# Patient Record
Sex: FEMALE | Race: WHITE | Employment: OTHER | URBAN - METROPOLITAN AREA
[De-identification: names, ages, dates, MRNs, and addresses within clinical notes are randomized per-mention and may not be internally consistent; named-entity substitution may affect disease eponyms.]

---

## 2017-02-22 ENCOUNTER — HOSPITAL ENCOUNTER (EMERGENCY)
Age: 52
Discharge: HOME | End: 2017-02-22
Attending: FAMILY MEDICINE

## 2017-02-22 VITALS
BODY MASS INDEX: 25.39 KG/M2 | OXYGEN SATURATION: 97 % | HEART RATE: 93 BPM | TEMPERATURE: 99.5 F | DIASTOLIC BLOOD PRESSURE: 85 MMHG | SYSTOLIC BLOOD PRESSURE: 137 MMHG | RESPIRATION RATE: 18 BRPM | WEIGHT: 158 LBS | HEIGHT: 66 IN

## 2017-02-22 DIAGNOSIS — R68.89 FLU-LIKE SYMPTOMS: Primary | ICD-10-CM

## 2017-02-22 LAB
INFLUENZA A AG (POC): NORMAL
INFLUENZA AG (POC) NEGATIVE CTRL.: NORMAL
INFLUENZA AG (POC) POSITIVE CTRL.: NORMAL
INFLUENZA B AG (POC): NORMAL
LOT NUMBER POC: NORMAL

## 2017-02-22 RX ORDER — PROMETHAZINE HYDROCHLORIDE AND DEXTROMETHORPHAN HYDROBROMIDE 6.25; 15 MG/5ML; MG/5ML
5 SYRUP ORAL
Qty: 120 ML | Refills: 0 | Status: SHIPPED | OUTPATIENT
Start: 2017-02-22 | End: 2017-10-10

## 2017-02-22 RX ORDER — ONDANSETRON 4 MG/1
4 TABLET, ORALLY DISINTEGRATING ORAL
Qty: 8 TAB | Refills: 0 | Status: SHIPPED | OUTPATIENT
Start: 2017-02-22 | End: 2017-10-10

## 2017-02-22 RX ORDER — OSELTAMIVIR PHOSPHATE 75 MG/1
75 CAPSULE ORAL 2 TIMES DAILY
Qty: 10 CAP | Refills: 0 | Status: SHIPPED | OUTPATIENT
Start: 2017-02-22 | End: 2017-02-27

## 2017-02-22 RX ORDER — BENZONATATE 200 MG/1
200 CAPSULE ORAL
Qty: 21 CAP | Refills: 0 | Status: SHIPPED | OUTPATIENT
Start: 2017-02-22 | End: 2017-03-01

## 2017-02-22 NOTE — UC PROVIDER NOTE
Patient is a 46 y.o. female presenting with flu symptoms. The history is provided by the patient. Flu   This is a new problem. The current episode started 6 to 12 hours ago. The problem occurs constantly. The problem has been rapidly worsening. The cough is non-productive. There has been a fever of 102 - 102.9 F. The fever has been present for less than 1 day. Associated symptoms include chills, headaches and myalgias. Pertinent negatives include no wheezing and no nausea. Her past medical history does not include pneumonia or asthma. History reviewed. No pertinent past medical history. Past Surgical History:   Procedure Laterality Date    EXTRACTION ERUPTED TOOTH/EXR Bilateral 3/30/2016    Patient Has Implants    HX TONSILLECTOMY           Family History   Problem Relation Age of Onset    Hypertension Mother     Heart Disease Father 62     MI    Lung Disease Father      smoker, emphysema    No Known Problems Sister     Hypertension Brother     Diabetes Paternal Grandmother         Social History     Social History    Marital status:      Spouse name: N/A    Number of children: N/A    Years of education: N/A     Occupational History    Not on file. Social History Main Topics    Smoking status: Former Smoker     Quit date: 11/28/2014    Smokeless tobacco: Never Used    Alcohol use Yes      Comment: few drinks per month    Drug use: No    Sexual activity: Yes     Partners: Male     Birth control/ protection: None     Other Topics Concern    Not on file     Social History Narrative                ALLERGIES: Review of patient's allergies indicates no known allergies. Review of Systems   Constitutional: Positive for chills. Respiratory: Negative for wheezing. Gastrointestinal: Negative for nausea. Musculoskeletal: Positive for myalgias. Neurological: Positive for headaches.        Vitals:    02/22/17 1622   BP: 137/85   Pulse: 93   Resp: 18   Temp: 99.5 °F (37.5 °C) SpO2: 97%   Weight: 71.7 kg (158 lb)   Height: 5' 6\" (1.676 m)       Physical Exam   Constitutional: She has a sickly appearance. No distress. HENT:   Right Ear: Tympanic membrane and ear canal normal.   Left Ear: Tympanic membrane and ear canal normal.   Nose: Nose normal.   Mouth/Throat: No oropharyngeal exudate, posterior oropharyngeal edema or posterior oropharyngeal erythema. Eyes: Conjunctivae are normal. Right eye exhibits no discharge. Left eye exhibits no discharge. Neck: Neck supple. Pulmonary/Chest: Effort normal and breath sounds normal. No respiratory distress. She has no wheezes. She has no rales. Lymphadenopathy:     She has no cervical adenopathy. Skin: No rash noted. Nursing note and vitals reviewed. MDM     Differential Diagnosis; Clinical Impression; Plan:     CLINICAL IMPRESSION:  Flu-like symptoms  (primary encounter diagnosis)      DDX    Plan:    Rapid flu- negative  Start tamiflu and cough rxc  Rest/ fluids  advil  Amount and/or Complexity of Data Reviewed:   Clinical lab tests:  Ordered and reviewed   Review and summarize past medical records:  Yes  Risk of Significant Complications, Morbidity, and/or Mortality:   Presenting problems: Moderate  Diagnostic procedures: Moderate  Management options:   Moderate  Progress:   Patient progress:  Stable      Procedures

## 2017-02-22 NOTE — DISCHARGE INSTRUCTIONS
Influenza (Flu): Care Instructions  Your Care Instructions  Influenza (flu) is an infection in the lungs and breathing passages. It is caused by the influenza virus. There are different strains, or types, of the flu virus from year to year. Unlike the common cold, the flu comes on suddenly and the symptoms, such as a cough, congestion, fever, chills, fatigue, aches, and pains, are more severe. These symptoms may last up to 10 days. Although the flu can make you feel very sick, it usually doesn't cause serious health problems. Home treatment is usually all you need for flu symptoms. But your doctor may prescribe antiviral medicine to prevent other health problems, such as pneumonia, from developing. Older people and those who have a long-term health condition, such as lung disease, are most at risk for having pneumonia or other health problems. Follow-up care is a key part of your treatment and safety. Be sure to make and go to all appointments, and call your doctor if you are having problems. Its also a good idea to know your test results and keep a list of the medicines you take. How can you care for yourself at home? · Get plenty of rest.  · Drink plenty of fluids, enough so that your urine is light yellow or clear like water. If you have kidney, heart, or liver disease and have to limit fluids, talk with your doctor before you increase the amount of fluids you drink. · Take an over-the-counter pain medicine if needed, such as acetaminophen (Tylenol), ibuprofen (Advil, Motrin), or naproxen (Aleve), to relieve fever, headache, and muscle aches. Read and follow all instructions on the label. No one younger than 20 should take aspirin. It has been linked to Reye syndrome, a serious illness. · Do not smoke. Smoking can make the flu worse. If you need help quitting, talk to your doctor about stop-smoking programs and medicines. These can increase your chances of quitting for good.   · Breathe moist air from a hot shower or from a sink filled with hot water to help clear a stuffy nose. · Before you use cough and cold medicines, check the label. These medicines may not be safe for young children or for people with certain health problems. · If the skin around your nose and lips becomes sore, put some petroleum jelly on the area. · To ease coughing:  ¨ Drink fluids to soothe a scratchy throat. ¨ Suck on cough drops or plain hard candy. ¨ Take an over-the-counter cough medicine that contains dextromethorphan to help you get some sleep. Read and follow all instructions on the label. ¨ Raise your head at night with an extra pillow. This may help you rest if coughing keeps you awake. · Take any prescribed medicine exactly as directed. Call your doctor if you think you are having a problem with your medicine. To avoid spreading the flu  · Wash your hands regularly, and keep your hands away from your face. · Stay home from school, work, and other public places until you are feeling better and your fever has been gone for at least 24 hours. The fever needs to have gone away on its own without the help of medicine. · Ask people living with you to talk to their doctors about preventing the flu. They may get antiviral medicine to keep from getting the flu from you. · To prevent the flu in the future, get a flu vaccine every fall. Encourage people living with you to get the vaccine. · Cover your mouth when you cough or sneeze. When should you call for help? Call 911 anytime you think you may need emergency care. For example, call if:  · You have severe trouble breathing. Call your doctor now or seek immediate medical care if:  · You have new or worse trouble breathing. · You seem to be getting much sicker. · You feel very sleepy or confused. · You have a new or higher fever. · You get a new rash.   Watch closely for changes in your health, and be sure to contact your doctor if:  · You begin to get better and then get worse. · You are not getting better after 1 week. Where can you learn more? Go to http://gallito-harleen.info/. Enter D057 in the search box to learn more about \"Influenza (Flu): Care Instructions. \"  Current as of: May 23, 2016  Content Version: 11.1  © 1835-1764 [email or web address]. Care instructions adapted under license by [employer] (which disclaims liability or warranty for this information). If you have questions about a medical condition or this instruction, always ask your healthcare professional. [address] any warranty or liability for your use of this information.

## 2017-10-10 ENCOUNTER — HOSPITAL ENCOUNTER (EMERGENCY)
Age: 52
Discharge: HOME | End: 2017-10-10
Attending: FAMILY MEDICINE

## 2017-10-10 VITALS
TEMPERATURE: 98 F | SYSTOLIC BLOOD PRESSURE: 147 MMHG | WEIGHT: 160 LBS | HEART RATE: 81 BPM | HEIGHT: 66 IN | RESPIRATION RATE: 16 BRPM | BODY MASS INDEX: 25.71 KG/M2 | OXYGEN SATURATION: 99 % | DIASTOLIC BLOOD PRESSURE: 82 MMHG

## 2017-10-10 DIAGNOSIS — L30.9 DERMATITIS: Primary | ICD-10-CM

## 2017-10-10 RX ORDER — PREDNISONE 10 MG/1
TABLET ORAL
Qty: 21 TAB | Refills: 0 | Status: SHIPPED | OUTPATIENT
Start: 2017-10-10 | End: 2018-01-24

## 2017-10-10 NOTE — UC PROVIDER NOTE
Patient is a 46 y.o. female presenting with rash. The history is provided by the patient. Rash    This is a new problem. Episode onset: 4-5 days ago. The problem has been gradually worsening. The problem is associated with an unknown factor. There has been no fever. The rash is present on the trunk, abdomen and left arm. The patient is experiencing no pain. Associated symptoms include itching. She has tried anti-itch cream for the symptoms. The treatment provided no relief. No past medical history on file. Past Surgical History:   Procedure Laterality Date    EXTRACTION ERUPTED TOOTH/EXR Bilateral 3/30/2016    Patient Has Implants    HX TONSILLECTOMY           Family History   Problem Relation Age of Onset    Hypertension Mother     Heart Disease Father 62     MI    Lung Disease Father      smoker, emphysema    No Known Problems Sister     Hypertension Brother     Diabetes Paternal Grandmother         Social History     Social History    Marital status:      Spouse name: N/A    Number of children: N/A    Years of education: N/A     Occupational History    Not on file. Social History Main Topics    Smoking status: Former Smoker     Quit date: 11/28/2014    Smokeless tobacco: Never Used    Alcohol use Yes      Comment: few drinks per month    Drug use: No    Sexual activity: Yes     Partners: Male     Birth control/ protection: None     Other Topics Concern    Not on file     Social History Narrative                ALLERGIES: Review of patient's allergies indicates no known allergies. Review of Systems   Constitutional: Negative for chills and fever. Respiratory: Negative for shortness of breath and wheezing. Cardiovascular: Negative for chest pain and palpitations. Gastrointestinal: Negative for nausea and vomiting. Musculoskeletal: Negative for myalgias. Skin: Positive for itching and rash.        Vitals:    10/10/17 1923   BP: 147/82   Pulse: 81   Resp: 16 Temp: 98 °F (36.7 °C)   SpO2: 99%   Weight: 72.6 kg (160 lb)   Height: 5' 6\" (1.676 m)       Physical Exam   Constitutional: She appears well-developed and well-nourished. No distress. Neurological: She is alert. Skin: She is not diaphoretic. Scattered Erythematous maculopapular lesions on left arm bilateral lower abdomen   Psychiatric: She has a normal mood and affect. Her behavior is normal. Judgment and thought content normal.   Nursing note and vitals reviewed. MDM     Differential Diagnosis; Clinical Impression; Plan:     CLINICAL IMPRESSION:  Dermatitis  (primary encounter diagnosis)    Plan:  1. pred kinjal  2. Pcp if no improvement  Risk of Significant Complications, Morbidity, and/or Mortality:   Presenting problems: Moderate  Management options:   Moderate  Progress:   Patient progress:  Stable      Procedures

## 2017-10-10 NOTE — DISCHARGE INSTRUCTIONS
Dermatitis: Care Instructions  Your Care Instructions  Dermatitis is the general name used for any rash or inflammation of the skin. Different kinds of dermatitis cause different kinds of rashes. Common causes of a rash include new medicines, plants (such as poison oak or poison ivy), heat, and stress. Certain illnesses can also cause a rash. An allergic reaction to something that touches your skin, such as latex, nickel, or poison ivy, is called contact dermatitis. Contact dermatitis may also be caused by something that irritates the skin, such as bleach, a chemical, or soap. These types of rashes cannot be spread from person to person. How long your rash will last depends on what caused it. Rashes may last a few days or months. Follow-up care is a key part of your treatment and safety. Be sure to make and go to all appointments, and call your doctor if you are having problems. It's also a good idea to know your test results and keep a list of the medicines you take. How can you care for yourself at home? · Do not scratch the rash. Cut your nails short, and file them smooth. Or wear gloves if this helps keep you from scratching. · Wash the area with water only. Pat dry. · Put cold, wet cloths on the rash to reduce itching. · Keep cool, and stay out of the sun. · Leave the rash open to the air as much as possible. · If the rash itches, use hydrocortisone cream. Follow the directions on the label. Calamine lotion may help for plant rashes. · Take an over-the-counter antihistamine, such as diphenhydramine (Benadryl) or loratadine (Claritin), to help calm the itching. Read and follow all instructions on the label. · If your doctor prescribed a cream, use it as directed. If your doctor prescribed medicine, take it exactly as directed. When should you call for help?   Call your doctor now or seek immediate medical care if:  · You have symptoms of infection, such as:  ¨ Increased pain, swelling, warmth, or redness. ¨ Red streaks leading from the area. ¨ Pus draining from the area. ¨ A fever. · You have joint pain along with the rash. Watch closely for changes in your health, and be sure to contact your doctor if:  · Your rash is changing or getting worse. · You are not getting better as expected. Where can you learn more? Go to http://gallito-harleen.info/. Enter [phone] [id number] [id number] in the search box to learn more about \"Dermatitis: Care Instructions. \"  Current as of: October 13, 2016  Content Version: 11.3  © 2874-5031 [employer]. Care instructions adapted under license by [employer] (which disclaims liability or warranty for this information). If you have questions about a medical condition or this instruction, always ask your healthcare professional. [address] any warranty or liability for your use of this information.

## 2018-01-24 ENCOUNTER — OFFICE VISIT (OUTPATIENT)
Dept: INTERNAL MEDICINE CLINIC | Age: 53
End: 2018-01-24

## 2018-01-24 VITALS
DIASTOLIC BLOOD PRESSURE: 81 MMHG | TEMPERATURE: 98.3 F | OXYGEN SATURATION: 95 % | RESPIRATION RATE: 16 BRPM | SYSTOLIC BLOOD PRESSURE: 126 MMHG | WEIGHT: 159 LBS | HEIGHT: 66 IN | BODY MASS INDEX: 25.55 KG/M2 | HEART RATE: 93 BPM

## 2018-01-24 DIAGNOSIS — Z13.29 SCREENING FOR THYROID DISORDER: ICD-10-CM

## 2018-01-24 DIAGNOSIS — R03.0 TRANSIENT ELEVATED BLOOD PRESSURE: Primary | ICD-10-CM

## 2018-01-24 DIAGNOSIS — Z11.59 NEED FOR HEPATITIS C SCREENING TEST: ICD-10-CM

## 2018-01-24 DIAGNOSIS — L98.9 SKIN LESION OF FACE: ICD-10-CM

## 2018-01-24 DIAGNOSIS — E78.00 ELEVATED CHOLESTEROL: ICD-10-CM

## 2018-01-24 RX ORDER — ASPIRIN 81 MG/1
81 TABLET ORAL DAILY
Qty: 90 TAB | Refills: 0
Start: 2018-01-24 | End: 2020-11-19

## 2018-01-24 NOTE — PROGRESS NOTES
Subjective:      Chief Complaint   Patient presents with    Blood Pressure Check       Nereida Garcia is a 45 y.o. female with concerns about blood pressure. Says she has family has history of HTN. Says that around the holidays, \"I was having really weird feelings and checked my blood pressure on my mother in law machine and my blood pressure was running 130-150/ for about two weeks. Over the past week its has been normal and I have been feeling fine\". She says that sometimes she notices her heart racing but it resolves quickly and does not have CP. Hypertension ROS: no TIA's, no chest pain on exertion, no dyspnea on exertion, no swelling of ankles ,headaches, shortness of breath, vision change. she generally follows a low fat low cholesterol diet, generally follows a low sodium diet, exercises sporadically, nonsmoker, caffeine intake coffee (1/2 caff) about 4 cups per day, alcohol intake rare. She has just recently started to watch her salt intake. Also has a couple spots on her skin she is worried about. One spot is on her nose and and one spot is in her left eyebrow. She says that the spot on her nose has been there a while and the eyebrow she thinks is fairly new. She has concerns because she spends a lot of time outside in the summer. No recent hospitalizations since last visit. No past medical history on file. Past Surgical History:   Procedure Laterality Date    EXTRACTION ERUPTED TOOTH/EXR Bilateral 3/30/2016    Patient Has Implants    HX TONSILLECTOMY       Social History   Substance Use Topics    Smoking status: Former Smoker     Quit date: 11/28/2014    Smokeless tobacco: Never Used    Alcohol use Yes      Comment: few drinks per month     family history includes Diabetes in her paternal grandmother; Heart Disease (age of onset: 62) in her father; Hypertension in her brother and mother; Lung Disease in her father; No Known Problems in her sister.   Current Outpatient Prescriptions on File Prior to Visit   Medication Sig    calcium-cholecalciferol, D3, (CALTRATE 600+D) tablet Take 1 Tab by mouth daily. No current facility-administered medications on file prior to visit. No Known Allergies    ROS:   History obtained from the patient   Neurological: no paresthesias, weakness, or dizziness  GEN: no weight loss, weight gain, fatigue or night sweats  CV: no PND, orthopnea, or palpitations, no chest pain  Resp: no dyspnea on exertion, no cough  Abd: no nausea, vomiting or diarrhea, no bloody or black stools  Endocrine: no hair loss, excessive thirst or polyuria    Nurses note reviewed    Objective:     Visit Vitals    /81 (BP 1 Location: Right arm, BP Patient Position: Sitting)    Pulse 93    Temp 98.3 °F (36.8 °C) (Oral)    Resp 16    Ht 5' 6\" (1.676 m)    Wt 159 lb (72.1 kg)    SpO2 95%    BMI 25.66 kg/m2     General:   alert, cooperative and no distress   Eyes: conjunctivae/sclerae clear. PERRL, EOM's intact   Ears: External auditory canals clear, tympanic membranes clear   Sinuses/Nose: No maxillary or frontal tenderness. Rhinorrhea present. Mouth:  No oral lesions, no pharyngeal erythema, no exudates   Neck: Trachea midline, no thyromegaly, no bruits   Heart: S1 and S2 normal,no murmurs noted    Lungs: Clear to auscultation bilaterally, no increased work of breathing   Abdomen: Soft, nontender. Normal bowel sounds   Extremities: No edema or cyanosis            Assessment/Plan:   Differential diagnosis and treatment options reviewed with patient who is in agreement with treatment plan as outlined below. ICD-10-CM ICD-9-CM    1. Transient elevated blood pressure M12.8 128.4 METABOLIC PANEL, COMPREHENSIVE      CBC WITH AUTOMATED DIFF      LIPID PANEL      aspirin delayed-release 81 mg tablet   2.  Skin lesion of face L98.9 709.9 REFERRAL TO DERMATOLOGY   3. Elevated cholesterol E78.00 272.0 LIPID PANEL   4. Screening for thyroid disorder Z13.29 V77.0 TSH 3RD GENERATION   5. Need for hepatitis C screening test Z11.59 V73.89 HEPATITIS C AB     Due for fasting labs, says she has only had coffee today. BP within normal limits today. Advised to journal/keep log of BP and other symptoms and return for follow up if BP remains elevated or palpitations persist.  No palpitations recently, she thinks likely related to stress. DASH diet discussed. Add baby ASA daily. May need to start cholesterol medication if lipid panel remains elevated. Referral to derm for new skin lesions. Advised sunscreen when going outdoors. Follow up one month or sooner if needed   Verbal and written instructions (see AVS) provided. Patient expresses understanding and agreement of diagnosis and treatment plan.

## 2018-01-24 NOTE — MR AVS SNAPSHOT
Dionna Carrasquillo 
 
 
 [address] [facility] [address] [phone] Patient: Genna Camilo MRN: [id number] :[id number] Visit Information Date & Time Provider Department Dept. Phone Encounter #  
 2018  1:00 PM Marj Salazar NP [facility] [phone] [id number] Follow-up Instructions Return in about 4 weeks (around 2018), or if symptoms worsen or fail to improve. Upcoming Health Maintenance Date Due Hepatitis C Screening 1965 COLONOSCOPY 1981 DTaP/Tdap/Td series (1 - Tdap) 1984 PAP AKA CERVICAL CYTOLOGY 1984 BREAST CANCER SCRN MAMMOGRAM 2013 Allergies as of 2018  Review Complete On: 2018 By: Marj Salazar NP No Known Allergies Current Immunizations  Reviewed on 10/31/2017 Name Date Influenza Vaccine 10/26/2017 12:00 AM  
  
 Not reviewed this visit You Were Diagnosed With   
  
 Codes Comments Transient elevated blood pressure    -  Primary ICD-10-CM: R03.0 ICD-9-CM: 796.2 Skin lesion of face     ICD-10-CM: L98.9 ICD-9-CM: 709.9 Elevated cholesterol     ICD-10-CM: E78.00 ICD-9-CM: 272.0 Screening for thyroid disorder     ICD-10-CM: Z13.29 ICD-9-CM: V77.0 Need for hepatitis C screening test     ICD-10-CM: Z11.59 
ICD-9-CM: V73.89 Vitals BP Pulse Temp Resp Height(growth percentile) Weight(growth percentile) 126/81 (BP 1 Location: Right arm, BP Patient Position: Sitting) 93 98.3 °F (36.8 °C) (Oral) 16 5' 6\" (1.676 m) 159 lb (72.1 kg) SpO2 BMI OB Status Smoking Status 95% 25.66 kg/m2 Menopause Former Smoker BMI and BSA Data Body Mass Index Body Surface Area  
 25.66 kg/m 2 1.83 m 2 Preferred Pharmacy Pharmacy Name Phone [facility] [address] [facility], [address] [place] [address] AT [address] [place] [address] [phone] Your Updated Medication List  
  
   
This list is accurate as of: 1/24/18  1:41 PM.  Always use your most recent med list.  
  
  
  
  
 aspirin delayed-release 81 mg tablet Take 1 Tab by mouth daily. calcium-cholecalciferol (D3) tablet Commonly known as:  CALTRATE 600+D Take 1 Tab by mouth daily. We Performed the Following CBC WITH AUTOMATED DIFF [09575 CPT(R)] HEPATITIS C AB [62108 CPT(R)] LIPID PANEL [05294 CPT(R)] METABOLIC PANEL, COMPREHENSIVE [53959 CPT(R)] REFERRAL TO DERMATOLOGY [REF19 Custom] Comments:  
 Suspicious lesion to face TSH 3RD GENERATION [51082 CPT(R)] Follow-up Instructions Return in about 4 weeks (around 2/21/2018), or if symptoms worsen or fail to improve. Referral Information Referral ID Referred By Referred To  
  
 [id number] CHANTALE KHAN NP   
   [address], [address] [place] [address] Phone: [phone] Fax: [phone] Visits Status Start Date End Date 1 New Request 1/24/18 1/24/19 If your referral has a status of pending review or denied, additional information will be sent to support the outcome of this decision. Patient Instructions High Cholesterol: Care Instructions Your Care Instructions Cholesterol is a type of fat in your blood. It is needed for many body functions, such as making new cells. Cholesterol is made by your body. It also comes from food you eat. High cholesterol means that you have too much of the fat in your blood. This raises your risk of a heart attack and stroke. LDL and HDL are part of your total cholesterol. LDL is the \"bad\" cholesterol. High LDL can raise your risk for heart disease, heart attack, and stroke. HDL is the \"good\" cholesterol. It helps clear bad cholesterol from the body. High HDL is linked with a lower risk of heart disease, heart attack, and stroke. Your cholesterol levels help your doctor find out your risk for having a heart attack or stroke. You and your doctor can talk about whether you need to lower your risk and what treatment is best for you. A heart-healthy lifestyle along with medicines can help lower your cholesterol and your risk. The way you choose to lower your risk will depend on how high your risk is for heart attack and stroke. It will also depend on how you feel about taking medicines. Follow-up care is a key part of your treatment and safety. Be sure to make and go to all appointments, and call your doctor if you are having problems. It's also a good idea to know your test results and keep a list of the medicines you take. How can you care for yourself at home? · Eat a variety of foods every day. Good choices include fruits, vegetables, whole grains (like oatmeal), dried beans and peas, nuts and seeds, soy products (like tofu), and fat-free or low-fat dairy products. · Replace butter, margarine, and hydrogenated or partially hydrogenated oils with olive and canola oils. (Canola oil margarine without trans fat is fine.) · Replace red meat with fish, poultry, and soy protein (like tofu). · Limit processed and packaged foods like chips, crackers, and cookies. · Bake, broil, or steam foods. Don't barnard them. · Be physically active. Get at least 30 minutes of exercise on most days of the week. Walking is a good choice. You also may want to do other activities, such as running, swimming, cycling, or playing tennis or team sports. · Stay at a healthy weight or lose weight by making the changes in eating and physical activity listed above. Losing just a small amount of weight, even 5 to 10 pounds, can reduce your risk for having a heart attack or stroke. · Do not smoke. When should you call for help? Watch closely for changes in your health, and be sure to contact your doctor if: 
? · You need help making lifestyle changes. ? · You have questions about your medicine. Where can you learn more? Go to http://gallito-harleen.info/. Enter D475 in the search box to learn more about \"High Cholesterol: Care Instructions. \" Current as of: September 21, 2016 Content Version: 11.4 © 7625-4984 [employer]. Care instructions adapted under license by [employer] (which disclaims liability or warranty for this information). If you have questions about a medical condition or this instruction, always ask your healthcare professional. [address] any warranty or liability for your use of this information. Elevated Blood Pressure: Care Instructions Your Care Instructions Blood pressure is a measure of how hard the blood pushes against the walls of your arteries. It's normal for blood pressure to go up and down throughout the day. But if it stays up over time, you have high blood pressure. Two numbers tell you your blood pressure. The first number is the systolic pressure. It shows how hard the blood pushes when your heart is pumping. The second number is the diastolic pressure. It shows how hard the blood pushes between heartbeats, when your heart is relaxed and filling with blood. An ideal blood pressure in adults is less than 120/80 (say \"120 over 80\"). High blood pressure is 140/90 or higher. You have high blood pressure if your top number is 140 or higher or your bottom number is 90 or higher, or both. The main test for high blood pressure is simple, fast, and painless. To diagnose high blood pressure, your doctor will test your blood pressure at different times. After testing your blood pressure, your doctor may ask you to test it again when you are home. If you are diagnosed with high blood pressure, you can work with your doctor to make a long-term plan to manage it. Follow-up care is a key part of your treatment and safety.  Be sure to make and go to all appointments, and call your doctor if you are having problems. It's also a good idea to know your test results and keep a list of the medicines you take. How can you care for yourself at home? · Do not smoke. Smoking increases your risk for heart attack and stroke. If you need help quitting, talk to your doctor about stop-smoking programs and medicines. These can increase your chances of quitting for good. · Stay at a healthy weight. · Try to limit how much sodium you eat to less than 2,300 milligrams (mg) a day. Your doctor may ask you to try to eat less than 1,500 mg a day. · Be physically active. Get at least 30 minutes of exercise on most days of the week. Walking is a good choice. You also may want to do other activities, such as running, swimming, cycling, or playing tennis or team sports. · Avoid or limit alcohol. Talk to your doctor about whether you can drink any alcohol. · Eat plenty of fruits, vegetables, and low-fat dairy products. Eat less saturated and total fats. · Learn how to check your blood pressure at home. When should you call for help? Call your doctor now or seek immediate medical care if: 
? · Your blood pressure is much higher than normal (such as 180/110 or higher). ? · You think high blood pressure is causing symptoms such as: ¨ Severe headache. ¨ Blurry vision. ? Watch closely for changes in your health, and be sure to contact your doctor if: 
? · You do not get better as expected. Where can you learn more? Go to http://gallito-harleen.info/. Enter J063 in the search box to learn more about \"Elevated Blood Pressure: Care Instructions. \" Current as of: September 21, 2016 Content Version: 11.4 © 2902-2888 [employer]. Care instructions adapted under license by [employer] (which disclaims liability or warranty for this information).  If you have questions about a medical condition or this instruction, always ask your healthcare professional. [address]  any warranty or liability for your use of this information. Introducing [facility]! Dani Noonan introduces Public[employer] patient portal. Now you can access parts of your medical record, email your doctor's office, and request medication refills online. 1. In your internet browser, go to [email or web address] 2. Click on the First Time User? Click Here link in the Sign In box. You will see the New Member Sign Up page. 3. Enter your PublicEngines Access Code exactly as it appears below. You will not need to use this code after youve completed the sign-up process. If you do not sign up before the expiration date, you must request a new code. · Public[employer] Access Code: [email or web address] Expires: 4/24/2018  1:41 PM 
 
4. Enter the last four digits of your Social Security Number (xxxx) and Date of Birth (mm/dd/yyyy) as indicated and click Submit. You will be taken to the next sign-up page. 5. Create a PublicEngines ID. This will be your PublicEngines login ID and cannot be changed, so think of one that is secure and easy to remember. 6. Create a PublicEngines password. You can change your password at any time. 7. Enter your Password Reset Question and Answer. This can be used at a later time if you forget your password. 8. Enter your e-mail address. You will receive e-mail notification when new information is available in [address] 9. Click Sign Up. You can now view and download portions of your medical record. 10. Click the Download Summary menu link to download a portable copy of your medical information. If you have questions, please visit the Frequently Asked Questions section of the PublicEngines website. Remember, PublicEngines is NOT to be used for urgent needs. For medical emergencies, dial 911. Now available from your iPhone and Android! Please provide this summary of care documentation to your next provider. Your primary care clinician is listed as Ja Wilson. If you have any questions after today's visit, please call [phone].

## 2018-01-24 NOTE — PATIENT INSTRUCTIONS
High Cholesterol: Care Instructions  Your Care Instructions    Cholesterol is a type of fat in your blood. It is needed for many body functions, such as making new cells. Cholesterol is made by your body. It also comes from food you eat. High cholesterol means that you have too much of the fat in your blood. This raises your risk of a heart attack and stroke. LDL and HDL are part of your total cholesterol. LDL is the \"bad\" cholesterol. High LDL can raise your risk for heart disease, heart attack, and stroke. HDL is the \"good\" cholesterol. It helps clear bad cholesterol from the body. High HDL is linked with a lower risk of heart disease, heart attack, and stroke. Your cholesterol levels help your doctor find out your risk for having a heart attack or stroke. You and your doctor can talk about whether you need to lower your risk and what treatment is best for you. A heart-healthy lifestyle along with medicines can help lower your cholesterol and your risk. The way you choose to lower your risk will depend on how high your risk is for heart attack and stroke. It will also depend on how you feel about taking medicines. Follow-up care is a key part of your treatment and safety. Be sure to make and go to all appointments, and call your doctor if you are having problems. It's also a good idea to know your test results and keep a list of the medicines you take. How can you care for yourself at home? · Eat a variety of foods every day. Good choices include fruits, vegetables, whole grains (like oatmeal), dried beans and peas, nuts and seeds, soy products (like tofu), and fat-free or low-fat dairy products. · Replace butter, margarine, and hydrogenated or partially hydrogenated oils with olive and canola oils. (Canola oil margarine without trans fat is fine.)  · Replace red meat with fish, poultry, and soy protein (like tofu). · Limit processed and packaged foods like chips, crackers, and cookies.   · Bake, broil, or steam foods. Don't barnard them. · Be physically active. Get at least 30 minutes of exercise on most days of the week. Walking is a good choice. You also may want to do other activities, such as running, swimming, cycling, or playing tennis or team sports. · Stay at a healthy weight or lose weight by making the changes in eating and physical activity listed above. Losing just a small amount of weight, even 5 to 10 pounds, can reduce your risk for having a heart attack or stroke. · Do not smoke. When should you call for help? Watch closely for changes in your health, and be sure to contact your doctor if:  ? · You need help making lifestyle changes. ? · You have questions about your medicine. Where can you learn more? Go to http://gallitoInsignia Technologiesharleen.info/. Enter U482 in the search box to learn more about \"High Cholesterol: Care Instructions. \"  Current as of: September 21, 2016  Content Version: 11.4  © 4130-5039 [employer]. Care instructions adapted under license by [employer] (which disclaims liability or warranty for this information). If you have questions about a medical condition or this instruction, always ask your healthcare professional. [place] [address] any warranty or liability for your use of this information. Elevated Blood Pressure: Care Instructions  Your Care Instructions    Blood pressure is a measure of how hard the blood pushes against the walls of your arteries. It's normal for blood pressure to go up and down throughout the day. But if it stays up over time, you have high blood pressure. Two numbers tell you your blood pressure. The first number is the systolic pressure. It shows how hard the blood pushes when your heart is pumping. The second number is the diastolic pressure. It shows how hard the blood pushes between heartbeats, when your heart is relaxed and filling with blood.  An ideal blood pressure in adults is less than 120/80 (say \"120 over 80\"). High blood pressure is 140/90 or higher. You have high blood pressure if your top number is 140 or higher or your bottom number is 90 or higher, or both. The main test for high blood pressure is simple, fast, and painless. To diagnose high blood pressure, your doctor will test your blood pressure at different times. After testing your blood pressure, your doctor may ask you to test it again when you are home. If you are diagnosed with high blood pressure, you can work with your doctor to make a long-term plan to manage it. Follow-up care is a key part of your treatment and safety. Be sure to make and go to all appointments, and call your doctor if you are having problems. It's also a good idea to know your test results and keep a list of the medicines you take. How can you care for yourself at home? · Do not smoke. Smoking increases your risk for heart attack and stroke. If you need help quitting, talk to your doctor about stop-smoking programs and medicines. These can increase your chances of quitting for good. · Stay at a healthy weight. · Try to limit how much sodium you eat to less than 2,300 milligrams (mg) a day. Your doctor may ask you to try to eat less than 1,500 mg a day. · Be physically active. Get at least 30 minutes of exercise on most days of the week. Walking is a good choice. You also may want to do other activities, such as running, swimming, cycling, or playing tennis or team sports. · Avoid or limit alcohol. Talk to your doctor about whether you can drink any alcohol. · Eat plenty of fruits, vegetables, and low-fat dairy products. Eat less saturated and total fats. · Learn how to check your blood pressure at home. When should you call for help? Call your doctor now or seek immediate medical care if:  ? · Your blood pressure is much higher than normal (such as 180/110 or higher).    ? · You think high blood pressure is causing symptoms such as:  ¨ Severe headache. ¨ Blurry vision. ? Watch closely for changes in your health, and be sure to contact your doctor if:  ? · You do not get better as expected. Where can you learn more? Go to http://gallito-harleen.info/. Enter N494 in the search box to learn more about \"Elevated Blood Pressure: Care Instructions. \"  Current as of: September 21, 2016  Content Version: 11.4  © 7195-5486 [employer]. Care instructions adapted under license by [employer] (which disclaims liability or warranty for this information). If you have questions about a medical condition or this instruction, always ask your healthcare professional. [place] [address] any warranty or liability for your use of this information.

## 2018-01-24 NOTE — PROGRESS NOTES
Reviewed record  In preparation for visit and have obtained necessary documentation. 1. Have you been to the ER, urgent care clinic since your last visit? Hospitalized since your last visit?seen at [facility]  2. Have you seen or consulted any other health care providers outside of the [address] [place] [address] since your last visit? Include any pap smears or colon screening. Had dental implant surgery in 6/2017  Advanced directives: Patient declines information on advanced directives. Patients vital signs discussed with physician.

## 2018-01-25 LAB
ALBUMIN SERPL-MCNC: 4.5 G/DL (ref 3.5–5.5)
ALBUMIN/GLOB SERPL: 1.1 {RATIO} (ref 1.2–2.2)
ALP SERPL-CCNC: 100 IU/L (ref 39–117)
ALT SERPL-CCNC: 14 IU/L (ref 0–32)
AST SERPL-CCNC: 22 IU/L (ref 0–40)
BASOPHILS # BLD AUTO: 0 X10E3/UL (ref 0–0.2)
BASOPHILS NFR BLD AUTO: 0 %
BILIRUB SERPL-MCNC: 0.3 MG/DL (ref 0–1.2)
BUN SERPL-MCNC: 6 MG/DL (ref 6–24)
BUN/CREAT SERPL: 10 (ref 9–23)
CALCIUM SERPL-MCNC: 10 MG/DL (ref 8.7–10.2)
CHLORIDE SERPL-SCNC: 98 MMOL/L (ref 96–106)
CHOLEST SERPL-MCNC: 206 MG/DL (ref 100–199)
CO2 SERPL-SCNC: 27 MMOL/L (ref 18–29)
CREAT SERPL-MCNC: 0.6 MG/DL (ref 0.57–1)
EOSINOPHIL # BLD AUTO: 0.2 X10E3/UL (ref 0–0.4)
EOSINOPHIL NFR BLD AUTO: 3 %
ERYTHROCYTE [DISTWIDTH] IN BLOOD BY AUTOMATED COUNT: 13.3 % (ref 12.3–15.4)
GFR SERPLBLD CREATININE-BSD FMLA CKD-EPI: 104 ML/MIN/1.73
GFR SERPLBLD CREATININE-BSD FMLA CKD-EPI: 120 ML/MIN/1.73
GLOBULIN SER CALC-MCNC: 4 G/DL (ref 1.5–4.5)
GLUCOSE SERPL-MCNC: 81 MG/DL (ref 65–99)
HCT VFR BLD AUTO: 40.3 % (ref 34–46.6)
HCV AB S/CO SERPL IA: <0.1 S/CO RATIO (ref 0–0.9)
HDLC SERPL-MCNC: 45 MG/DL
HGB BLD-MCNC: 13 G/DL (ref 11.1–15.9)
IMM GRANULOCYTES # BLD: 0 X10E3/UL (ref 0–0.1)
IMM GRANULOCYTES NFR BLD: 0 %
INTERPRETATION, 910389: NORMAL
LDLC SERPL CALC-MCNC: 134 MG/DL (ref 0–99)
LYMPHOCYTES # BLD AUTO: 2.1 X10E3/UL (ref 0.7–3.1)
LYMPHOCYTES NFR BLD AUTO: 27 %
MCH RBC QN AUTO: 28.1 PG (ref 26.6–33)
MCHC RBC AUTO-ENTMCNC: 32.3 G/DL (ref 31.5–35.7)
MCV RBC AUTO: 87 FL (ref 79–97)
MONOCYTES # BLD AUTO: 0.7 X10E3/UL (ref 0.1–0.9)
MONOCYTES NFR BLD AUTO: 9 %
NEUTROPHILS # BLD AUTO: 4.7 X10E3/UL (ref 1.4–7)
NEUTROPHILS NFR BLD AUTO: 61 %
PLATELET # BLD AUTO: 347 X10E3/UL (ref 150–379)
POTASSIUM SERPL-SCNC: 5.1 MMOL/L (ref 3.5–5.2)
PROT SERPL-MCNC: 8.5 G/DL (ref 6–8.5)
RBC # BLD AUTO: 4.63 X10E6/UL (ref 3.77–5.28)
SODIUM SERPL-SCNC: 142 MMOL/L (ref 134–144)
TRIGL SERPL-MCNC: 137 MG/DL (ref 0–149)
TSH SERPL DL<=0.005 MIU/L-ACNC: 1.51 UIU/ML (ref 0.45–4.5)
VLDLC SERPL CALC-MCNC: 27 MG/DL (ref 5–40)
WBC # BLD AUTO: 7.7 X10E3/UL (ref 3.4–10.8)

## 2018-01-25 NOTE — PROGRESS NOTES
Please let her know that her labs look good. Her cholesterol is still mildly elevated but better than it was one year ago. Continue to work on diet and exercise. No need for medication yet.   Thyroid is normal and all other labs normal.   Thanks   Marlen SAUCEDA

## 2018-01-26 NOTE — PROGRESS NOTES
Spoke with patient and after verifying name and date of birth of patient gave patient test results and any instructions in note per provider. Patient stated understanding.

## 2018-02-21 ENCOUNTER — OFFICE VISIT (OUTPATIENT)
Dept: INTERNAL MEDICINE CLINIC | Age: 53
End: 2018-02-21

## 2018-02-21 VITALS
SYSTOLIC BLOOD PRESSURE: 120 MMHG | OXYGEN SATURATION: 96 % | BODY MASS INDEX: 25.07 KG/M2 | HEIGHT: 66 IN | RESPIRATION RATE: 14 BRPM | DIASTOLIC BLOOD PRESSURE: 76 MMHG | HEART RATE: 92 BPM | TEMPERATURE: 98.2 F | WEIGHT: 156 LBS

## 2018-02-21 DIAGNOSIS — F33.8 SEASONAL AFFECTIVE DISORDER (HCC): Primary | ICD-10-CM

## 2018-02-21 RX ORDER — BUPROPION HYDROCHLORIDE 150 MG/1
150 TABLET ORAL
Qty: 30 TAB | Refills: 4 | Status: SHIPPED | OUTPATIENT
Start: 2018-02-21 | End: 2018-11-12

## 2018-02-21 NOTE — PROGRESS NOTES
Eleazar Cervantes  Identified pt with two pt identifiers(name and ). Chief Complaint   Patient presents with    Hypertension     Room 1// NON fasting        1. Have you been to the ER, urgent care clinic since your last visit? Hospitalized since your last visit? NO    2. Have you seen or consulted any other health care providers outside of the [address] [place] [address] since your last visit? Include any pap smears or colon screening. NO      Dr Sariah Barker notified of reason for visit, vitals and flowsheets obtained on patients. Patient received paperwork for advance directive during previous visit but has not completed at this time     Reviewed record In preparation for visit, huddled with provider and have obtained necessary documentation      Health Maintenance Due   Topic    COLONOSCOPY     DTaP/Tdap/Td series (1 - Tdap)    PAP AKA CERVICAL CYTOLOGY     BREAST CANCER SCRN MAMMOGRAM        Wt Readings from Last 3 Encounters:   18 156 lb (70.8 kg)   18 159 lb (72.1 kg)   10/10/17 160 lb (72.6 kg)     Temp Readings from Last 3 Encounters:   18 98.3 °F (36.8 °C) (Oral)   10/10/17 98 °F (36.7 °C)   17 99.5 °F (37.5 °C)     BP Readings from Last 3 Encounters:   18 126/81   10/10/17 147/82   17 137/85     Pulse Readings from Last 3 Encounters:   18 93   10/10/17 81   17 93     Vitals:    18 1329   Resp: 14   Weight: 156 lb (70.8 kg)   Height: 5' 6\" (1.676 m)         Learning Assessment:  :     No flowsheet data found. Depression Screening:  :     PHQ over the last two weeks 2018   Little interest or pleasure in doing things Not at all   Feeling down, depressed or hopeless Several days   Total Score PHQ 2 1       Fall Risk Assessment:  :     No flowsheet data found. Abuse Screening:  :     No flowsheet data found. ADL Screening:  :     No flowsheet data found.              I have received verbal consent from Eleazar Cervantes to discuss any/all medical information while they are present in the room. Medication reconciliation up to date and corrected with patient at this time.

## 2018-02-21 NOTE — MR AVS SNAPSHOT
[address] [address] [address] [address] [phone] Patient: Edith Alvares MRN: [id number] :1965 Visit Information Date & Time Provider Department Dept. Phone Encounter #  
 2018  1:30 PM MD Nima Bundykorey Rangel [phone] [id number] Follow-up Instructions Return in about 3 months (around 2018) for mood, med eval. Upcoming Health Maintenance Date Due COLONOSCOPY 1981 DTaP/Tdap/Td series (1 - Tdap) 1984 PAP AKA CERVICAL CYTOLOGY 1984 BREAST CANCER SCRN MAMMOGRAM 2020 Allergies as of 2018  Review Complete On: 2018 By: Hernandez Tejeda LPN No Known Allergies Current Immunizations  Reviewed on 10/31/2017 Name Date Influenza Vaccine 10/26/2017 12:00 AM  
  
 Not reviewed this visit You Were Diagnosed With   
  
 Codes Comments Seasonal affective disorder ([facility] 75.)    -  Primary ICD-10-CM: F33.9 ICD-9-CM: 296.99 Vitals BP Pulse Temp Resp Height(growth percentile) Weight(growth percentile) 120/76 92 98.2 °F (36.8 °C) (Oral) 14 5' 6\" (1.676 m) 156 lb (70.8 kg) SpO2 BMI OB Status Smoking Status 96% 25.18 kg/m2 Menopause Former Smoker Vitals History BMI and BSA Data Body Mass Index Body Surface Area  
 25.18 kg/m 2 1.82 m 2 Preferred Pharmacy Pharmacy Name Phone [facility] [address] [facility], [address] [place] [address] AT [address] [place] [address] [phone] Your Updated Medication List  
  
   
This list is accurate as of 18  1:50 PM.  Always use your most recent med list.  
  
  
  
  
 aspirin delayed-release 81 mg tablet Take 1 Tab by mouth daily. buPROPion  mg tablet Commonly known as:  Aledo Hence Take 1 Tab by mouth every morning. calcium-cholecalciferol (D3) tablet Commonly known as:  CALTRATE 600+D Take 1 Tab by mouth daily. Prescriptions Sent to Pharmacy Refills buPROPion XL (WELLBUTRIN XL) 150 mg tablet 4 Sig: Take 1 Tab by mouth every morning. Class: Normal  
 Pharmacy: [facility] [address] [facility], [address] [facility] Sandie Damico Ph #: [phone] Route: Oral  
  
Follow-up Instructions Return in about 3 months (around 5/21/2018) for mood, med eval.  
  
  
Introducing [facility]! Jethro Yu introduces Anjuke patient portal. Now you can access parts of your medical record, email your doctor's office, and request medication refills online. 1. In your internet browser, go to [email or web address] 2. Click on the First Time User? Click Here link in the Sign In box. You will see the New Member Sign Up page. 3. Enter your Anjuke Access Code exactly as it appears below. You will not need to use this code after youve completed the sign-up process. If you do not sign up before the expiration date, you must request a new code. · Anjuke Access Code: [email or web address] Expires: 4/24/2018  1:41 PM 
 
4. Enter the last four digits of your Social Security Number (xxxx) and Date of Birth (mm/dd/yyyy) as indicated and click Submit. You will be taken to the next sign-up page. 5. Create a Anjuke ID. This will be your Anjuke login ID and cannot be changed, so think of one that is secure and easy to remember. 6. Create a Anjuke password. You can change your password at any time. 7. Enter your Password Reset Question and Answer. This can be used at a later time if you forget your password. 8. Enter your e-mail address. You will receive e-mail notification when new information is available in [address] 9. Click Sign Up. You can now view and download portions of your medical record.  
10. Click the Download Summary menu link to download a portable copy of your medical information. If you have questions, please visit the Frequently Asked Questions section of the HungerTimet website. Remember, [facility] is NOT to be used for urgent needs. For medical emergencies, dial 911. Now available from your iPhone and Android! Please provide this summary of care documentation to your next provider. Your primary care clinician is listed as Ja Wilson. If you have any questions after today's visit, please call [phone].

## 2018-02-21 NOTE — PROGRESS NOTES
HPI  Ms. Daniel Naranjo is a 45y.o. year old female, she is seen today for follow up elevated BP. BP was high over the holidays, lately has been about 120s/80s. No chest pain, sob, dizziness or weakness. Felt like she had \"winter blues\" - had been on wellbutrin for about 6 mos about 15 years and symptoms improved. Mood always worse in winter.  has noted she is less interested in doing things she used to enjoy. Doesn't even enjoy cleaning her house as in past. Making excuses to not go out with friends. No crying spells, no SI. Is feeling more tired, sleeping more. Appetite is okay. Has been feeling this way for at least 6 mos, worst past few weeks. Mood got worse with menopause. Daughter had been on zoloft and didn't do well on this. Chief Complaint   Patient presents with    Hypertension     Room 1// NON fasting // mammo done at [address] [place] [address]  last week     Depression     Wellbutrin was effective in the past for her         Prior to Admission medications    Medication Sig Start Date End Date Taking? Authorizing Provider   buPROPion XL (WELLBUTRIN XL) 150 mg tablet Take 1 Tab by mouth every morning. 2/21/18  Yes Solange Mcclain MD   aspirin delayed-release 81 mg tablet Take 1 Tab by mouth daily. 1/24/18  Yes Rhonda Bob NP   calcium-cholecalciferol, D3, (CALTRATE 600+D) tablet Take 1 Tab by mouth daily. Yes Historical Provider         No Known Allergies      REVIEW OF SYSTEMS:  Per HPI    PHYSICAL EXAM:  Visit Vitals    /76    Pulse 92    Temp 98.2 °F (36.8 °C) (Oral)    Resp 14    Ht 5' 6\" (1.676 m)    Wt 156 lb (70.8 kg)    SpO2 96%    BMI 25.18 kg/m2     Constitutional: Appears well-developed and well-nourished. No distress. HENT:   Head: Normocephalic and atraumatic. Eyes: No scleral icterus. Cardiovascular: Normal S1/S2, regular rhythm. No murmurs, rubs, or gallops. Pulmonary/Chest: Effort normal and breath sounds normal. No respiratory distress.  No wheezes, rhonchi, or rales. Ext: No edema. Neurological: Alert. Psychiatric: Normal mood and affect. Behavior is normal.     Lab Results   Component Value Date/Time    Sodium 142 01/24/2018 04:41 PM    Potassium 5.1 01/24/2018 04:41 PM    Chloride 98 01/24/2018 04:41 PM    CO2 27 01/24/2018 04:41 PM    Glucose 81 01/24/2018 04:41 PM    BUN 6 01/24/2018 04:41 PM    Creatinine 0.60 01/24/2018 04:41 PM    BUN/Creatinine ratio 10 01/24/2018 04:41 PM    GFR est  01/24/2018 04:41 PM    GFR est non- 01/24/2018 04:41 PM    Calcium 10.0 01/24/2018 04:41 PM    Bilirubin, total 0.3 01/24/2018 04:41 PM    AST (SGOT) 22 01/24/2018 04:41 PM    Alk. phosphatase 100 01/24/2018 04:41 PM    Protein, total 8.5 01/24/2018 04:41 PM    Albumin 4.5 01/24/2018 04:41 PM    A-G Ratio 1.1 (L) 01/24/2018 04:41 PM    ALT (SGPT) 14 01/24/2018 04:41 PM     No results found for: HBA1C, HGBE8, IFW2YTGK, XLP4PJGP   Lab Results   Component Value Date/Time    Cholesterol, total 206 (H) 01/24/2018 04:41 PM    HDL Cholesterol 45 01/24/2018 04:41 PM    LDL, calculated 134 (H) 01/24/2018 04:41 PM    VLDL, calculated 27 01/24/2018 04:41 PM    Triglyceride 137 01/24/2018 04:41 PM          ASSESSMENT/PLAN  Diagnoses and all orders for this visit:    1. Seasonal affective disorder (HCC)  -     buPROPion XL (WELLBUTRIN XL) 150 mg tablet; Take 1 Tab by mouth every morning.  has done well with wellbutrin in past - will restart  Also encouraged exercise which should help mood and further weight loss    Health Maintenance Due   Topic Date Due    COLONOSCOPY  11/19/1981    DTaP/Tdap/Td series (1 - Tdap) 11/19/1984    PAP AKA CERVICAL CYTOLOGY  11/19/1984        Follow-up Disposition:  Return in about 3 months (around 5/21/2018) for mood, med eval.       Reviewed plan of care. Patient has provided input and agrees with goals.      The nurse provided the patient and/or family with advanced directive information if needed and encouraged the patient to provide a copy to the office when available.

## 2018-11-12 ENCOUNTER — OFFICE VISIT (OUTPATIENT)
Dept: URGENT CARE | Age: 53
End: 2018-11-12

## 2018-11-12 VITALS
HEART RATE: 85 BPM | BODY MASS INDEX: 25.07 KG/M2 | SYSTOLIC BLOOD PRESSURE: 182 MMHG | OXYGEN SATURATION: 99 % | HEIGHT: 66 IN | WEIGHT: 156 LBS | RESPIRATION RATE: 16 BRPM | TEMPERATURE: 97.5 F | DIASTOLIC BLOOD PRESSURE: 103 MMHG

## 2018-11-12 DIAGNOSIS — R03.0 BLOOD PRESSURE ELEVATED WITHOUT HISTORY OF HTN: ICD-10-CM

## 2018-11-12 DIAGNOSIS — J06.9 VIRAL UPPER RESPIRATORY TRACT INFECTION: Primary | ICD-10-CM

## 2018-11-12 RX ORDER — PROMETHAZINE HYDROCHLORIDE AND DEXTROMETHORPHAN HYDROBROMIDE 6.25; 15 MG/5ML; MG/5ML
5 SYRUP ORAL
Qty: 60 ML | Refills: 0 | Status: SHIPPED | OUTPATIENT
Start: 2018-11-12 | End: 2018-11-26

## 2018-11-12 RX ORDER — FLUTICASONE PROPIONATE 50 MCG
2 SPRAY, SUSPENSION (ML) NASAL DAILY
Qty: 1 BOTTLE | Refills: 0 | Status: SHIPPED | OUTPATIENT
Start: 2018-11-12 | End: 2022-03-01

## 2018-11-12 RX ORDER — BENZONATATE 200 MG/1
200 CAPSULE ORAL
Qty: 21 CAP | Refills: 0 | Status: SHIPPED | OUTPATIENT
Start: 2018-11-12 | End: 2018-11-19

## 2018-11-12 RX ORDER — PREDNISONE 5 MG/1
TABLET ORAL
Qty: 21 TAB | Refills: 0 | Status: SHIPPED | OUTPATIENT
Start: 2018-11-12 | End: 2018-11-26

## 2018-11-12 RX ORDER — CLONIDINE HYDROCHLORIDE 0.1 MG/1
0.1 TABLET ORAL
Qty: 1 TAB | Refills: 0 | Status: SHIPPED | COMMUNITY
Start: 2018-11-12 | End: 2018-11-12

## 2018-11-12 RX ORDER — CLONIDINE HYDROCHLORIDE 0.1 MG/1
0.1 TABLET ORAL
Qty: 20 TAB | Refills: 0 | Status: SHIPPED | OUTPATIENT
Start: 2018-11-12 | End: 2020-03-18

## 2018-11-12 NOTE — PATIENT INSTRUCTIONS
Upper Respiratory Infection (Cold): Care Instructions  Your Care Instructions    An upper respiratory infection, or URI, is an infection of the nose, sinuses, or throat. URIs are spread by coughs, sneezes, and direct contact. The common cold is the most frequent kind of URI. The flu and sinus infections are other kinds of URIs. Almost all URIs are caused by viruses. Antibiotics won't cure them. But you can treat most infections with home care. This may include drinking lots of fluids and taking over-the-counter pain medicine. You will probably feel better in 4 to 10 days. The doctor has checked you carefully, but problems can develop later. If you notice any problems or new symptoms, get medical treatment right away. Follow-up care is a key part of your treatment and safety. Be sure to make and go to all appointments, and call your doctor if you are having problems. It's also a good idea to know your test results and keep a list of the medicines you take. How can you care for yourself at home? · To prevent dehydration, drink plenty of fluids, enough so that your urine is light yellow or clear like water. Choose water and other caffeine-free clear liquids until you feel better. If you have kidney, heart, or liver disease and have to limit fluids, talk with your doctor before you increase the amount of fluids you drink. · Take an over-the-counter pain medicine, such as acetaminophen (Tylenol), ibuprofen (Advil, Motrin), or naproxen (Aleve). Read and follow all instructions on the label. · Before you use cough and cold medicines, check the label. These medicines may not be safe for young children or for people with certain health problems. · Be careful when taking over-the-counter cold or flu medicines and Tylenol at the same time. Many of these medicines have acetaminophen, which is Tylenol. Read the labels to make sure that you are not taking more than the recommended dose.  Too much acetaminophen (Tylenol) can be harmful. · Get plenty of rest.  · Do not smoke or allow others to smoke around you. If you need help quitting, talk to your doctor about stop-smoking programs and medicines. These can increase your chances of quitting for good. When should you call for help? Call 911 anytime you think you may need emergency care. For example, call if:    · You have severe trouble breathing.    Call your doctor now or seek immediate medical care if:    · You seem to be getting much sicker.     · You have new or worse trouble breathing.     · You have a new or higher fever.     · You have a new rash.    Watch closely for changes in your health, and be sure to contact your doctor if:    · You have a new symptom, such as a sore throat, an earache, or sinus pain.     · You cough more deeply or more often, especially if you notice more mucus or a change in the color of your mucus.     · You do not get better as expected. Where can you learn more? Go to http://gallito-harleen.info/. Enter F315 in the search box to learn more about \"Upper Respiratory Infection (Cold): Care Instructions. \"  Current as of: December 6, 2017  Content Version: 11.8  © 2210-8340 [employer]. Care instructions adapted under license by [employer] (which disclaims liability or warranty for this information). If you have questions about a medical condition or this instruction, always ask your healthcare professional. [place] [address] any warranty or liability for your use of this information. DASH Diet: Care Instructions  Your Care Instructions    The DASH diet is an eating plan that can help lower your blood pressure. DASH stands for Dietary Approaches to Stop Hypertension. Hypertension is high blood pressure. The DASH diet focuses on eating foods that are high in calcium, potassium, and magnesium. These nutrients can lower blood pressure.  The foods that are highest in these nutrients are fruits, vegetables, low-fat dairy products, nuts, seeds, and legumes. But taking calcium, potassium, and magnesium supplements instead of eating foods that are high in those nutrients does not have the same effect. The DASH diet also includes whole grains, fish, and poultry. The DASH diet is one of several lifestyle changes your doctor may recommend to lower your high blood pressure. Your doctor may also want you to decrease the amount of sodium in your diet. Lowering sodium while following the DASH diet can lower blood pressure even further than just the DASH diet alone. Follow-up care is a key part of your treatment and safety. Be sure to make and go to all appointments, and call your doctor if you are having problems. It's also a good idea to know your test results and keep a list of the medicines you take. How can you care for yourself at home? Following the DASH diet  · Eat 4 to 5 servings of fruit each day. A serving is 1 medium-sized piece of fruit, ½ cup chopped or canned fruit, 1/4 cup dried fruit, or 4 ounces (½ cup) of fruit juice. Choose fruit more often than fruit juice. · Eat 4 to 5 servings of vegetables each day. A serving is 1 cup of lettuce or raw leafy vegetables, ½ cup of chopped or cooked vegetables, or 4 ounces (½ cup) of vegetable juice. Choose vegetables more often than vegetable juice. · Get 2 to 3 servings of low-fat and fat-free dairy each day. A serving is 8 ounces of milk, 1 cup of yogurt, or 1 ½ ounces of cheese. · Eat 6 to 8 servings of grains each day. A serving is 1 slice of bread, 1 ounce of dry cereal, or ½ cup of cooked rice, pasta, or cooked cereal. Try to choose whole-grain products as much as possible. · Limit lean meat, poultry, and fish to 2 servings each day. A serving is 3 ounces, about the size of a deck of cards. · Eat 4 to 5 servings of nuts, seeds, and legumes (cooked dried beans, lentils, and split peas) each week.  A serving is 1/3 cup of nuts, 2 tablespoons of seeds, or ½ cup of cooked beans or peas. · Limit fats and oils to 2 to 3 servings each day. A serving is 1 teaspoon of vegetable oil or 2 tablespoons of salad dressing. · Limit sweets and added sugars to 5 servings or less a week. A serving is 1 tablespoon jelly or jam, ½ cup sorbet, or 1 cup of lemonade. · Eat less than 2,300 milligrams (mg) of sodium a day. If you limit your sodium to 1,500 mg a day, you can lower your blood pressure even more. Tips for success  · Start small. Do not try to make dramatic changes to your diet all at once. You might feel that you are missing out on your favorite foods and then be more likely to not follow the plan. Make small changes, and stick with them. Once those changes become habit, add a few more changes. · Try some of the following:  ? Make it a goal to eat a fruit or vegetable at every meal and at snacks. This will make it easy to get the recommended amount of fruits and vegetables each day. ? Try yogurt topped with fruit and nuts for a snack or healthy dessert. ? Add lettuce, tomato, cucumber, and onion to sandwiches. ? Combine a ready-made pizza crust with low-fat mozzarella cheese and lots of vegetable toppings. Try using tomatoes, squash, spinach, broccoli, carrots, cauliflower, and onions. ? Have a variety of cut-up vegetables with a low-fat dip as an appetizer instead of chips and dip. ? Sprinkle sunflower seeds or chopped almonds over salads. Or try adding chopped walnuts or almonds to cooked vegetables. ? Try some vegetarian meals using beans and peas. Add garbanzo or kidney beans to salads. Make burritos and tacos with mashed sol beans or black beans. Where can you learn more? Go to http://gallito-harleen.info/. Enter N983 in the search box to learn more about \"DASH Diet: Care Instructions. \"  Current as of: December 6, 2017  Content Version: 11.8  © 3342-0627 [employer].  Care instructions adapted under license by [address] (which disclaims liability or warranty for this information). If you have questions about a medical condition or this instruction, always ask your healthcare professional. [address] any warranty or liability for your use of this information.

## 2018-11-13 NOTE — PROGRESS NOTES
Sinus Pain   This is a new problem. The current episode started more than 1 week ago. The problem occurs daily. The problem has not changed since onset. Associated symptoms include headaches. Pertinent negatives include no chest pain, no abdominal pain and no shortness of breath. The symptoms are aggravated by sneezing and coughing. Nothing relieves the symptoms. She has tried nothing for the symptoms. Hypertension   This is a new problem. The current episode started 2 days ago. The problem occurs constantly. The problem has not changed since onset. Associated symptoms include headaches. Pertinent negatives include no chest pain, no abdominal pain and no shortness of breath. The symptoms are aggravated by stress. Nothing relieves the symptoms. She has tried nothing for the symptoms. History reviewed. No pertinent past medical history.      Past Surgical History:   Procedure Laterality Date    HX TONSILLECTOMY      AK EXTRAC ERUPTED TOOTH/EXPOSED ROOT Bilateral 3/30/2016    Patient Has Implants         Family History   Problem Relation Age of Onset    Hypertension Mother     Heart Disease Father 62        MI    Lung Disease Father         smoker, emphysema    No Known Problems Sister     Hypertension Brother     Diabetes Paternal Grandmother         Social History     Socioeconomic History    Marital status:      Spouse name: Not on file    Number of children: Not on file    Years of education: Not on file    Highest education level: Not on file   Social Needs    Financial resource strain: Not on file    Food insecurity - worry: Not on file    Food insecurity - inability: Not on file   [employer] needs - medical: Not on file   [employer] needs - non-medical: Not on file   Occupational History    Not on file   Tobacco Use    Smoking status: Former Smoker     Last attempt to quit: 11/28/2014     Years since quitting: 3.9    Smokeless tobacco: Never Used   Substance and Sexual Activity    Alcohol use: Yes     Comment: few drinks per month    Drug use: No    Sexual activity: Yes     Partners: Male     Birth control/protection: None   Other Topics Concern    Not on file   Social History Narrative    Not on file                ALLERGIES: Patient has no known allergies. Review of Systems   HENT: Positive for sinus pain. Respiratory: Negative for chest tightness and shortness of breath. Cardiovascular: Negative for chest pain and palpitations. Gastrointestinal: Negative for abdominal pain. Neurological: Positive for headaches. All other systems reviewed and are negative. Vitals:    11/12/18 1812 11/12/18 1851   BP: (!) 202/103 (!) 182/103   Pulse: 85    Resp: 16    Temp: 97.5 °F (36.4 °C)    SpO2: 99%    Weight: 156 lb (70.8 kg)    Height: 5' 6\" (1.676 m)        Physical Exam   Constitutional: No distress. HENT:   Right Ear: Tympanic membrane and ear canal normal.   Left Ear: Tympanic membrane and ear canal normal.   Nose: Nose normal.   Mouth/Throat: No oropharyngeal exudate, posterior oropharyngeal edema or posterior oropharyngeal erythema. Eyes: Conjunctivae are normal. Right eye exhibits no discharge. Left eye exhibits no discharge. Neck: Neck supple. Cardiovascular: Normal rate, normal heart sounds and intact distal pulses. No murmur heard. Pulmonary/Chest: Effort normal and breath sounds normal. No respiratory distress. She has no wheezes. She has no rales. Abdominal: Soft. Musculoskeletal: Normal range of motion. Lymphadenopathy:     She has no cervical adenopathy. Skin: No rash noted. Nursing note and vitals reviewed. MDM    Procedures      ICD-10-CM ICD-9-CM    1. Viral upper respiratory tract infection J06.9 465.9    2. Blood pressure elevated without history of HTN R03.0 796.2      Medications Ordered Today   Medications    cloNIDine HCl (CATAPRES) 0.1 mg tablet     Sig: Take 1 Tab by mouth now for 1 dose.      Dispense:  1 Tab     Refill:  0     Order Specific Question:   Expiration Date     Answer:   2019     Order Specific Question:   Lot#     Answer:   [id number]     Order Specific Question:        Answer:   Talisha Munoz     Order Specific Question:   NDC#     Answer:   [id number]    benzonatate (TESSALON) 200 mg capsule     Sig: Take 1 Cap by mouth three (3) times daily as needed for Cough for up to 7 days. Dispense:  21 Cap     Refill:  0    fluticasone (FLONASE) 50 mcg/actuation nasal spray     Si Sprays by Both Nostrils route daily. Dispense:  1 Bottle     Refill:  0    predniSONE (STERAPRED) 5 mg dose pack     Sig: See administration instruction per 5mg dose pack     Dispense:  21 Tab     Refill:  0    promethazine-dextromethorphan (PROMETHAZINE-DM) 6.25-15 mg/5 mL syrup     Sig: Take 5 mL by mouth nightly as needed for Cough. Dispense:  60 mL     Refill:  0    cloNIDine HCl (CATAPRES) 0.1 mg tablet     Sig: Take 1 Tab by mouth three (3) times daily as needed (for BP >160/100). Dispense:  20 Tab     Refill:  0     No results found for any visits on 18. The patients condition was discussed with the patient and they understand. The patient is to follow up with primary care doctor. If signs and symptoms become worse the pt is to go to the ER. The patient is to take medications as prescribed.

## 2018-11-20 ENCOUNTER — OFFICE VISIT (OUTPATIENT)
Dept: URGENT CARE | Age: 53
End: 2018-11-20

## 2018-11-20 VITALS
OXYGEN SATURATION: 97 % | HEART RATE: 75 BPM | BODY MASS INDEX: 25.07 KG/M2 | WEIGHT: 156 LBS | HEIGHT: 66 IN | DIASTOLIC BLOOD PRESSURE: 102 MMHG | SYSTOLIC BLOOD PRESSURE: 176 MMHG | RESPIRATION RATE: 18 BRPM | TEMPERATURE: 98.1 F

## 2018-11-20 DIAGNOSIS — B96.89 ACUTE BACTERIAL SINUSITIS: Primary | ICD-10-CM

## 2018-11-20 DIAGNOSIS — R03.0 ELEVATED BLOOD PRESSURE READING: ICD-10-CM

## 2018-11-20 DIAGNOSIS — J01.90 ACUTE BACTERIAL SINUSITIS: Primary | ICD-10-CM

## 2018-11-20 RX ORDER — AMOXICILLIN AND CLAVULANATE POTASSIUM 875; 125 MG/1; MG/1
1 TABLET, FILM COATED ORAL 2 TIMES DAILY
Qty: 14 TAB | Refills: 0 | Status: SHIPPED | OUTPATIENT
Start: 2018-11-20 | End: 2018-11-27

## 2018-11-20 NOTE — PATIENT INSTRUCTIONS
Follow up with PCP without improvement over next 5-7 days sooner/immediately for new or worsening       Sinusitis: Care Instructions  Your Care Instructions    Sinusitis is an infection of the lining of the sinus cavities in your head. Sinusitis often follows a cold. It causes pain and pressure in your head and face. In most cases, sinusitis gets better on its own in 1 to 2 weeks. But some mild symptoms may last for several weeks. Sometimes antibiotics are needed. Follow-up care is a key part of your treatment and safety. Be sure to make and go to all appointments, and call your doctor if you are having problems. It's also a good idea to know your test results and keep a list of the medicines you take. How can you care for yourself at home? · Take an over-the-counter pain medicine, such as acetaminophen (Tylenol), ibuprofen (Advil, Motrin), or naproxen (Aleve). Read and follow all instructions on the label. · If the doctor prescribed antibiotics, take them as directed. Do not stop taking them just because you feel better. You need to take the full course of antibiotics. · Be careful when taking over-the-counter cold or flu medicines and Tylenol at the same time. Many of these medicines have acetaminophen, which is Tylenol. Read the labels to make sure that you are not taking more than the recommended dose. Too much acetaminophen (Tylenol) can be harmful. · Breathe warm, moist air from a steamy shower, a hot bath, or a sink filled with hot water. Avoid cold, dry air. Using a humidifier in your home may help. Follow the directions for cleaning the machine. · Use saline (saltwater) nasal washes to help keep your nasal passages open and wash out mucus and bacteria. You can buy saline nose drops at a grocery store or drugstore. Or you can make your own at home by adding 1 teaspoon of salt and 1 teaspoon of baking soda to 2 cups of distilled water.  If you make your own, fill a bulb syringe with the solution, insert the tip into your nostril, and squeeze gently. [place] your nose. · Put a hot, wet towel or a warm gel pack on your face 3 or 4 times a day for 5 to 10 minutes each time. · Try a decongestant nasal spray like oxymetazoline (Afrin). Do not use it for more than 3 days in a row. Using it for more than 3 days can make your congestion worse. When should you call for help? Call your doctor now or seek immediate medical care if:    · You have new or worse swelling or redness in your face or around your eyes.     · You have a new or higher fever.    Watch closely for changes in your health, and be sure to contact your doctor if:    · You have new or worse facial pain.     · The mucus from your nose becomes thicker (like pus) or has new blood in it.     · You are not getting better as expected. Where can you learn more? Go to http://gallito-harleen.info/. Enter G940 in the search box to learn more about \"Sinusitis: Care Instructions. \"  Current as of: March 28, 2018  Content Version: 11.8  © 0349-2325 [employer]. Care instructions adapted under license by [employer] (which disclaims liability or warranty for this information). If you have questions about a medical condition or this instruction, always ask your healthcare professional. [address] any warranty or liability for your use of this information.

## 2018-11-20 NOTE — PROGRESS NOTES
Here for sinus infection  Seen for viral URI 11/12 approx 1 week ago  Has not been improving with worsening sinus pain, thick green discharge and post nasal drip. Denies SOB, significant cough, severe headache  Overall worsening. History reviewed. No pertinent past medical history. Past Surgical History:   Procedure Laterality Date    HX TONSILLECTOMY      SC EXTRAC ERUPTED TOOTH/EXPOSED ROOT Bilateral 3/30/2016    Patient Has Implants         Family History   Problem Relation Age of Onset    Hypertension Mother     Heart Disease Father 62        MI    Lung Disease Father         smoker, emphysema    No Known Problems Sister     Hypertension Brother     Diabetes Paternal Grandmother         Social History     Socioeconomic History    Marital status:      Spouse name: Not on file    Number of children: Not on file    Years of education: Not on file    Highest education level: Not on file   Social Needs    Financial resource strain: Not on file    Food insecurity - worry: Not on file    Food insecurity - inability: Not on file   [employer] needs - medical: Not on file   [employer] needs - non-medical: Not on file   Occupational History    Not on file   Tobacco Use    Smoking status: Former Smoker     Last attempt to quit: 11/28/2014     Years since quitting: 3.9    Smokeless tobacco: Never Used   Substance and Sexual Activity    Alcohol use: Yes     Comment: few drinks per month    Drug use: No    Sexual activity: Yes     Partners: Male     Birth control/protection: None   Other Topics Concern    Not on file   Social History Narrative    Not on file                ALLERGIES: Patient has no known allergies. Review of Systems   All other systems reviewed and are negative.       Vitals:    11/20/18 1642   BP: (!) 176/102   Pulse: 75   Resp: 18   Temp: 98.1 °F (36.7 °C)   SpO2: 97%   Weight: 156 lb (70.8 kg)   Height: 5' 6\" (1.676 m)       Physical Exam Constitutional: She is oriented to person, place, and time. She appears well-developed. No distress. Non-toxic appearing, well hydrated   HENT:   Swollen nasal turbinates bilat with thick yellowish discharge in nasal pasasges L>R. TMs normal bilat. OP with post nasal drip otherwise clear and moist.   Eyes: Conjunctivae and EOM are normal. Pupils are equal, round, and reactive to light. No scleral icterus. Neck: Normal range of motion. Neck supple. Cardiovascular: Normal rate, regular rhythm and normal heart sounds. Exam reveals no gallop and no friction rub. No murmur heard. Pulmonary/Chest: Effort normal and breath sounds normal. No respiratory distress. She has no wheezes. She has no rales. Lymphadenopathy:     She has no cervical adenopathy. Neurological: She is alert and oriented to person, place, and time. No cranial nerve deficit. Skin: Skin is warm and dry. No rash noted. She is not diaphoretic. No erythema. No pallor. Psychiatric: She has a normal mood and affect. Her behavior is normal. Thought content normal.   Nursing note and vitals reviewed. MDM     Differential Diagnosis; Clinical Impression; Plan:       CLINICAL IMPRESSION:  (J01.90,  B96.89) Acute bacterial sinusitis  (primary encounter diagnosis)  (R03.0) Elevated blood pressure reading    Orders Placed This Encounter      amoxicillin-clavulanate (AUGMENTIN) 875-125 mg per tablet      Plan:  1. Will treat for bacterial sinus infection with Augmentin  2. Elevated BP says was 139/84 at home when checking. Record and follow up with PCP within 1 month. 3. Review handouts      We have reviewed concerning signs/symptoms, normal vs abnormal progression of medical condition and when to seek immediate medical attention. Schedule with PCP or Urgent Care immediately for worsening or new symptoms.   See your PCP if there is not at least some improvement in symptoms within the next 1 week  You should see your PCP for updates on your routine health maintenance.          Procedures

## 2018-11-26 ENCOUNTER — OFFICE VISIT (OUTPATIENT)
Dept: INTERNAL MEDICINE CLINIC | Facility: CLINIC | Age: 53
End: 2018-11-26

## 2018-11-26 VITALS
HEIGHT: 66 IN | RESPIRATION RATE: 16 BRPM | HEART RATE: 81 BPM | OXYGEN SATURATION: 97 % | BODY MASS INDEX: 25.17 KG/M2 | DIASTOLIC BLOOD PRESSURE: 82 MMHG | TEMPERATURE: 98.5 F | SYSTOLIC BLOOD PRESSURE: 128 MMHG | WEIGHT: 156.6 LBS

## 2018-11-26 DIAGNOSIS — F41.9 ANXIETY: Primary | ICD-10-CM

## 2018-11-26 DIAGNOSIS — J01.90 ACUTE NON-RECURRENT SINUSITIS, UNSPECIFIED LOCATION: ICD-10-CM

## 2018-11-26 DIAGNOSIS — R03.0 ELEVATED BP WITHOUT DIAGNOSIS OF HYPERTENSION: ICD-10-CM

## 2018-11-26 RX ORDER — FLUOXETINE 10 MG/1
10 TABLET ORAL DAILY
Qty: 30 TAB | Refills: 1 | Status: SHIPPED | OUTPATIENT
Start: 2018-11-26 | End: 2019-01-12 | Stop reason: SDUPTHER

## 2018-11-26 NOTE — PROGRESS NOTES
HPI Ms. Jc Novoa is a 52y.o. year old female, she is seen today for HTN, URI. VIJAYA and KALI both  in 2018 - end of March and  - she was caregiver. Also babysits during the day. On  started feeling \"strange\". Went to  and BP was in 717 systolic range. Was prescribed clonidine 0.1mg tid prn /100.  
148/88 at home today. Was also treated for URI with prednisone initially and then started feeling worse, went back last week and was prescribed augmentin - getting a little better now. Continues to have cough, chest congestion, nasal congestion. No f/c. Sputum is yellow. Was wheezing, not now. Now has vaginal itching, c/w yeast infection . No discharge yet. In past BP would get high if was taking cold medications and then return to normal range. Has FH HTN in parents, siblings. Has been on zoloft for depression and made her feel worse, sadder, not better so stopped it. Says she gets easily aggravated, easily flustered. Says she may get upset/agitated about things that shouldn't bother her, lower patience. No chest pain or sob, no HA or vision changes. Other than HA with recent illness. Feels hearing is worse left ear. Wonders about hearing test.  
 
Chief Complaint Patient presents with  Blood Pressure Check Room 2C// Seen at [facility]  Sinus Infection Seen at [facility]- last day of Augmentin-- needs diflucan Prior to Admission medications Medication Sig Start Date End Date Taking? Authorizing Provider  
amoxicillin-clavulanate (AUGMENTIN) 875-125 mg per tablet Take 1 Tab by mouth two (2) times a day for 7 days. 18 Yes Johnny Thakur NP  
fluticasone (FLONASE) 50 mcg/actuation nasal spray 2 Sprays by Both Nostrils route daily.  18  Yes Yennifer Ledbetter MD  
cloNIDine HCl (CATAPRES) 0.1 mg tablet Take 1 Tab by mouth three (3) times daily as needed (for BP >160/100). 11/12/18  Yes Abiodun Alberts MD  
aspirin delayed-release 81 mg tablet Take 1 Tab by mouth daily. 1/24/18  Yes Rhonda Bob NP  
calcium-cholecalciferol, D3, (CALTRATE 600+D) tablet Take 1 Tab by mouth daily. Yes Provider, Historical  
predniSONE (STERAPRED) 5 mg dose pack See administration instruction per 5mg dose pack 11/12/18   Abiodun Alberts MD  
promethazine-dextromethorphan (PROMETHAZINE-DM) 6.25-15 mg/5 mL syrup Take 5 mL by mouth nightly as needed for Cough. 11/12/18   Abiodun Alberts MD  
 
 
 
No Known Allergies REVIEW OF SYSTEMS: 
Per HPI PHYSICAL EXAM: 
Visit Vitals /82 (BP 1 Location: Left arm, BP Patient Position: Sitting) Pulse 81 Temp 98.5 °F (36.9 °C) (Oral) Resp 16 Ht 5' 6\" (1.676 m) Wt 156 lb 9.6 oz (71 kg) SpO2 97% BMI 25.28 kg/m² Constitutional: Appears well-developed and well-nourished. No distress. HENT:  
Head: Normocephalic and atraumatic. Eyes: No scleral icterus. Nose: b/l nasal mucosa erythema and edema Mouth: OP clear without lesions, no pharyngeal exudate Neck: no lad, no tm, supple Cardiovascular: Normal S1/S2, regular rhythm. No murmurs, rubs, or gallops. Pulmonary/Chest: Effort normal and breath sounds normal. No respiratory distress. No wheezes, rhonchi, or rales. Ext: No edema. Neurological: Alert. Psychiatric: Normal mood and affect. Behavior is normal. 
  
Lab Results Component Value Date/Time  Sodium 142 01/24/2018 04:41 PM  
 Potassium 5.1 01/24/2018 04:41 PM  
 Chloride 98 01/24/2018 04:41 PM  
 CO2 27 01/24/2018 04:41 PM  
 Glucose 81 01/24/2018 04:41 PM  
 BUN 6 01/24/2018 04:41 PM  
 Creatinine 0.60 01/24/2018 04:41 PM  
 BUN/Creatinine ratio 10 01/24/2018 04:41 PM  
 GFR est  01/24/2018 04:41 PM  
 GFR est non- 01/24/2018 04:41 PM  
 Calcium 10.0 01/24/2018 04:41 PM  
 Bilirubin, total 0.3 01/24/2018 04:41 PM  
 AST (SGOT) 22 01/24/2018 04:41 PM  
 Alk. phosphatase 100 01/24/2018 04:41 PM  
 Protein, total 8.5 01/24/2018 04:41 PM  
 Albumin 4.5 01/24/2018 04:41 PM  
 A-G Ratio 1.1 (L) 01/24/2018 04:41 PM  
 ALT (SGPT) 14 01/24/2018 04:41 PM  
 
No results found for: HBA1C, HGBE8, ZKT2JDUM Lab Results Component Value Date/Time Cholesterol, total 206 (H) 01/24/2018 04:41 PM  
 HDL Cholesterol 45 01/24/2018 04:41 PM  
 LDL, calculated 134 (H) 01/24/2018 04:41 PM  
 VLDL, calculated 27 01/24/2018 04:41 PM  
 Triglyceride 137 01/24/2018 04:41 PM  
  
 
 
ASSESSMENT/PLAN Diagnoses and all orders for this visit: 
 
1. Anxiety 
-     FLUoxetine (PROZAC) 10 mg tablet; Take 1 Tab by mouth daily. 
-     TSH RFX ON ABNORMAL TO FREE T4 Add above - check tft's 2. Elevated BP without diagnosis of hypertension -     METABOLIC PANEL, COMPREHENSIVE 
-     CBC WITH AUTOMATED DIFF 
-     TSH RFX ON ABNORMAL TO FREE T4 
Monitor - not high today 3. Acute non-recurrent sinusitis, unspecified location Improving - finish abx Health Maintenance Due Topic Date Due  
 DTaP/Tdap/Td series (1 - Tdap) 11/19/1984  Influenza Age 5 to Adult  08/01/2018 Follow-up Disposition: 
Return in about 1 month (around 12/26/2018) for bp, med eval.  
 
 
Reviewed plan of care. Patient has provided input and agrees with goals. The nurse provided the patient and/or family with advanced directive information if needed and encouraged the patient to provide a copy to the office when available.

## 2018-11-26 NOTE — PATIENT INSTRUCTIONS
Elevated Blood Pressure: Care Instructions Your Care Instructions Blood pressure is a measure of how hard the blood pushes against the walls of your arteries. It's normal for blood pressure to go up and down throughout the day. But if it stays up over time, you have high blood pressure. Two numbers tell you your blood pressure. The first number is the systolic pressure. It shows how hard the blood pushes when your heart is pumping. The second number is the diastolic pressure. It shows how hard the blood pushes between heartbeats, when your heart is relaxed and filling with blood. An ideal blood pressure in adults is less than 120/80 (say \"120 over 80\"). High blood pressure is 140/90 or higher. You have high blood pressure if your top number is 140 or higher or your bottom number is 90 or higher, or both. The main test for high blood pressure is simple, fast, and painless. To diagnose high blood pressure, your doctor will test your blood pressure at different times. After testing your blood pressure, your doctor may ask you to test it again when you are home. If you are diagnosed with high blood pressure, you can work with your doctor to make a long-term plan to manage it. Follow-up care is a key part of your treatment and safety. Be sure to make and go to all appointments, and call your doctor if you are having problems. It's also a good idea to know your test results and keep a list of the medicines you take. How can you care for yourself at home? · Do not smoke. Smoking increases your risk for heart attack and stroke. If you need help quitting, talk to your doctor about stop-smoking programs and medicines. These can increase your chances of quitting for good. · Stay at a healthy weight. · Try to limit how much sodium you eat to less than 2,300 milligrams (mg) a day. Your doctor may ask you to try to eat less than 1,500 mg a day. · Be physically active. Get at least 30 minutes of exercise on most days of the week. Walking is a good choice. You also may want to do other activities, such as running, swimming, cycling, or playing tennis or team sports. · Avoid or limit alcohol. Talk to your doctor about whether you can drink any alcohol. · Eat plenty of fruits, vegetables, and low-fat dairy products. Eat less saturated and total fats. · Learn how to check your blood pressure at home. When should you call for help? Call your doctor now or seek immediate medical care if: 
? · Your blood pressure is much higher than normal (such as 180/110 or higher). ? · You think high blood pressure is causing symptoms such as: ¨ Severe headache. ¨ Blurry vision. ? Watch closely for changes in your health, and be sure to contact your doctor if: 
? · You do not get better as expected. Where can you learn more? Go to http://gallito-harleen.info/. Enter D056 in the search box to learn more about \"Elevated Blood Pressure: Care Instructions. \" Current as of: September 21, 2016 Content Version: 11.4 © 2678-7209 [employer]. Care instructions adapted under license by [employer] (which disclaims liability or warranty for this information). If you have questions about a medical condition or this instruction, always ask your healthcare professional. [address] any warranty or liability for your use of this information.

## 2018-11-26 NOTE — PROGRESS NOTES
Natividad Johnsonnancy  Identified pt with two pt identifiers(name and ). Chief Complaint Patient presents with  Blood Pressure Check Room . Have you been to the ER, urgent care clinic since your last visit? Hospitalized since your last visit? NO 
 
2. Have you seen or consulted any other health care providers outside of the [address] [place] [address] since your last visit? Include any pap smears or colon screening. NO Provider notified of reason for visit, vitals and flowsheets obtained on patients. Patient received paperwork for advance directive during previous visit but has not completed at this time Reviewed record In preparation for visit, huddled with provider and have obtained necessary documentation Health Maintenance Due Topic  COLONOSCOPY  DTaP/Tdap/Td series (1 - Tdap)  PAP AKA CERVICAL CYTOLOGY  Shingrix Vaccine Age 50> (1 of 2)  Influenza Age 5 to Adult Wt Readings from Last 3 Encounters:  
18 156 lb 9.6 oz (71 kg) 18 156 lb (70.8 kg)  
18 156 lb (70.8 kg) Temp Readings from Last 3 Encounters:  
18 98.1 °F (36.7 °C)  
18 97.5 °F (36.4 °C)  
18 98.2 °F (36.8 °C) (Oral) BP Readings from Last 3 Encounters:  
18 (!) 176/102  
18 (!) 182/103  
18 120/76 Pulse Readings from Last 3 Encounters:  
18 75  
18 85  
18 92 Vitals:  
 18 1341 Resp: 16 Weight: 156 lb 9.6 oz (71 kg) Height: 5' 6\" (1.676 m) PainSc:   0 - No pain Learning Assessment: 
:  
 
No flowsheet data found. Depression Screening: 
:  
 
PHQ over the last two weeks 2018 Little interest or pleasure in doing things Nearly every day Feeling down, depressed, irritable, or hopeless Nearly every day Total Score PHQ 2 6 Trouble falling or staying asleep, or sleeping too much More than half the days Feeling tired or having little energy Nearly every day Poor appetite, weight loss, or overeating Not at all Feeling bad about yourself - or that you are a failure or have let yourself or your family down More than half the days Trouble concentrating on things such as school, work, reading, or watching TV Not at all Moving or speaking so slowly that other people could have noticed; or the opposite being so fidgety that others notice Not at all Thoughts of being better off dead, or hurting yourself in some way Not at all PHQ 9 Score 13 Fall Risk Assessment: 
:  
 
No flowsheet data found. Abuse Screening: 
:  
 
No flowsheet data found. ADL Screening: 
:  
 
No flowsheet data found. Medication reconciliation up to date and corrected with patient at this time.

## 2018-11-27 DIAGNOSIS — E87.5 SERUM POTASSIUM ELEVATED: Primary | ICD-10-CM

## 2018-11-27 LAB
ALBUMIN SERPL-MCNC: 4.5 G/DL (ref 3.5–5.5)
ALBUMIN/GLOB SERPL: 1.3 {RATIO} (ref 1.2–2.2)
ALP SERPL-CCNC: 102 IU/L (ref 39–117)
ALT SERPL-CCNC: 17 IU/L (ref 0–32)
AST SERPL-CCNC: 20 IU/L (ref 0–40)
BASOPHILS # BLD AUTO: 0.1 X10E3/UL (ref 0–0.2)
BASOPHILS NFR BLD AUTO: 1 %
BILIRUB SERPL-MCNC: <0.2 MG/DL (ref 0–1.2)
BUN SERPL-MCNC: 7 MG/DL (ref 6–24)
BUN/CREAT SERPL: 10 (ref 9–23)
CALCIUM SERPL-MCNC: 10.2 MG/DL (ref 8.7–10.2)
CHLORIDE SERPL-SCNC: 97 MMOL/L (ref 96–106)
CO2 SERPL-SCNC: 28 MMOL/L (ref 20–29)
CREAT SERPL-MCNC: 0.68 MG/DL (ref 0.57–1)
EOSINOPHIL # BLD AUTO: 0.2 X10E3/UL (ref 0–0.4)
EOSINOPHIL NFR BLD AUTO: 2 %
ERYTHROCYTE [DISTWIDTH] IN BLOOD BY AUTOMATED COUNT: 14 % (ref 12.3–15.4)
GLOBULIN SER CALC-MCNC: 3.6 G/DL (ref 1.5–4.5)
GLUCOSE SERPL-MCNC: 90 MG/DL (ref 65–99)
HCT VFR BLD AUTO: 37.9 % (ref 34–46.6)
HGB BLD-MCNC: 12.5 G/DL (ref 11.1–15.9)
IMM GRANULOCYTES # BLD: 0 X10E3/UL (ref 0–0.1)
IMM GRANULOCYTES NFR BLD: 0 %
LYMPHOCYTES # BLD AUTO: 2.8 X10E3/UL (ref 0.7–3.1)
LYMPHOCYTES NFR BLD AUTO: 29 %
MCH RBC QN AUTO: 29.3 PG (ref 26.6–33)
MCHC RBC AUTO-ENTMCNC: 33 G/DL (ref 31.5–35.7)
MCV RBC AUTO: 89 FL (ref 79–97)
MONOCYTES # BLD AUTO: 0.9 X10E3/UL (ref 0.1–0.9)
MONOCYTES NFR BLD AUTO: 9 %
NEUTROPHILS # BLD AUTO: 6 X10E3/UL (ref 1.4–7)
NEUTROPHILS NFR BLD AUTO: 59 %
PLATELET # BLD AUTO: 401 X10E3/UL (ref 150–379)
POTASSIUM SERPL-SCNC: 5.9 MMOL/L (ref 3.5–5.2)
PROT SERPL-MCNC: 8.1 G/DL (ref 6–8.5)
RBC # BLD AUTO: 4.27 X10E6/UL (ref 3.77–5.28)
SODIUM SERPL-SCNC: 137 MMOL/L (ref 134–144)
TSH SERPL DL<=0.005 MIU/L-ACNC: 2.13 UIU/ML (ref 0.45–4.5)
WBC # BLD AUTO: 10 X10E3/UL (ref 3.4–10.8)

## 2018-11-27 NOTE — PROGRESS NOTES
Tell her labs normal other than high K - could be lab error. Have her come in today or tomorrow for repeat K only.

## 2018-11-29 LAB — POTASSIUM SERPL-SCNC: 4.5 MMOL/L (ref 3.5–5.2)

## 2019-01-03 ENCOUNTER — OFFICE VISIT (OUTPATIENT)
Dept: INTERNAL MEDICINE CLINIC | Facility: CLINIC | Age: 54
End: 2019-01-03

## 2019-01-03 VITALS
HEART RATE: 83 BPM | BODY MASS INDEX: 24.88 KG/M2 | RESPIRATION RATE: 16 BRPM | DIASTOLIC BLOOD PRESSURE: 82 MMHG | TEMPERATURE: 98.1 F | WEIGHT: 154.8 LBS | HEIGHT: 66 IN | OXYGEN SATURATION: 97 % | SYSTOLIC BLOOD PRESSURE: 127 MMHG

## 2019-01-03 DIAGNOSIS — L30.9 DERMATITIS: ICD-10-CM

## 2019-01-03 DIAGNOSIS — R03.0 ELEVATED BP WITHOUT DIAGNOSIS OF HYPERTENSION: ICD-10-CM

## 2019-01-03 DIAGNOSIS — J32.9 SINUSITIS, UNSPECIFIED CHRONICITY, UNSPECIFIED LOCATION: ICD-10-CM

## 2019-01-03 DIAGNOSIS — F41.9 ANXIETY: Primary | ICD-10-CM

## 2019-01-03 RX ORDER — DOXYCYCLINE 100 MG/1
100 CAPSULE ORAL 2 TIMES DAILY
Qty: 20 CAP | Refills: 0 | Status: SHIPPED | OUTPATIENT
Start: 2019-01-03 | End: 2019-05-07 | Stop reason: ALTCHOICE

## 2019-01-03 RX ORDER — TRIAMCINOLONE ACETONIDE 1 MG/G
OINTMENT TOPICAL 2 TIMES DAILY
Qty: 30 G | Refills: 1 | Status: SHIPPED | OUTPATIENT
Start: 2019-01-03 | End: 2020-03-18

## 2019-01-03 NOTE — PROGRESS NOTES
HPI  Ms. Radha Georges is a 52y.o. year old female, she is seen today for follow up anxiety with other issues as well. Has continued to have chest congestion, sinus pressure over left forehead, yellow mucous from nose which is thick. Did finish abx - augmentin. Symptoms improved but never completely resolved. Tried tylenol severe sinus which helped temporarily. Is also coughing. No chest pain or sob. Says anxiety is improved with fluoxetine, mood is more even, more patience. Hasn't needed to take clonidine - hasn't needed to take at all. Has had rash on right forearm for months off and on, thought it was poison ivy at first and calamine helped but has now returned. Is itchy at times with blisters. No new soaps or detergents. Chief Complaint   Patient presents with    Blood Pressure Check     Room 2B// NON fasting     Rash     R forearm x \"months\"    Nasal Congestion        Prior to Admission medications    Medication Sig Start Date End Date Taking? Authorizing Provider   FLUoxetine (PROZAC) 10 mg tablet Take 1 Tab by mouth daily. 11/26/18  Yes Avila Caceres MD   fluticasone (FLONASE) 50 mcg/actuation nasal spray 2 Sprays by Both Nostrils route daily. 11/12/18  Yes Panchito Del Cid MD   aspirin delayed-release 81 mg tablet Take 1 Tab by mouth daily. 1/24/18  Yes Rhonda Bob, NP   calcium-cholecalciferol, D3, (CALTRATE 600+D) tablet Take 1 Tab by mouth daily. Yes Provider, Historical   cloNIDine HCl (CATAPRES) 0.1 mg tablet Take 1 Tab by mouth three (3) times daily as needed (for BP >160/100). 11/12/18   Panchito Del Cid MD         No Known Allergies      REVIEW OF SYSTEMS:  Per HPI    PHYSICAL EXAM:  Visit Vitals  /82 (BP 1 Location: Left arm, BP Patient Position: Sitting)   Pulse 83   Temp 98.1 °F (36.7 °C) (Oral)   Resp 16   Ht 5' 6\" (1.676 m)   Wt 154 lb 12.8 oz (70.2 kg)   SpO2 97%   BMI 24.99 kg/m²     Constitutional: Appears well-developed and well-nourished. No distress.    Skin: scaling and erythematous papules with some scabs right forearm  HENT:   Head: Normocephalic and atraumatic. Eyes: No scleral icterus. Nose: b/l nasal mucosa congested   Mouth: OP clear without lesions, no pharyngeal exudate  Neck: no lad, no tm, supple   Cardiovascular: Normal S1/S2, regular rhythm. No murmurs, rubs, or gallops. Pulmonary/Chest: Effort normal and breath sounds normal. No respiratory distress. No wheezes, rhonchi, or rales. Ext: No edema. Neurological: Alert. Psychiatric: Normal mood and affect. Behavior is normal.     Lab Results   Component Value Date/Time    Sodium 137 11/26/2018 02:29 PM    Potassium 4.5 11/28/2018 12:28 PM    Chloride 97 11/26/2018 02:29 PM    CO2 28 11/26/2018 02:29 PM    Glucose 90 11/26/2018 02:29 PM    BUN 7 11/26/2018 02:29 PM    Creatinine 0.68 11/26/2018 02:29 PM    BUN/Creatinine ratio 10 11/26/2018 02:29 PM    GFR est  11/26/2018 02:29 PM    GFR est non-AA 99 11/26/2018 02:29 PM    Calcium 10.2 11/26/2018 02:29 PM    Bilirubin, total <0.2 11/26/2018 02:29 PM    AST (SGOT) 20 11/26/2018 02:29 PM    Alk. phosphatase 102 11/26/2018 02:29 PM    Protein, total 8.1 11/26/2018 02:29 PM    Albumin 4.5 11/26/2018 02:29 PM    A-G Ratio 1.3 11/26/2018 02:29 PM    ALT (SGPT) 17 11/26/2018 02:29 PM     No results found for: HBA1C, HGBE8, [id number]   Lab Results   Component Value Date/Time    Cholesterol, total 206 (H) 01/24/2018 04:41 PM    HDL Cholesterol 45 01/24/2018 04:41 PM    LDL, calculated 134 (H) 01/24/2018 04:41 PM    VLDL, calculated 27 01/24/2018 04:41 PM    Triglyceride 137 01/24/2018 04:41 PM          ASSESSMENT/PLAN  Diagnoses and all orders for this visit:    1. Anxiety  Controlled on current regimen, continue   2. Sinusitis, unspecified chronicity, unspecified location  -     doxycycline (MONODOX) 100 mg capsule; Take 1 Cap by mouth two (2) times a day. Never completely resolved - treat as above  3.  Elevated BP without diagnosis of hypertension  acceptable  4. Dermatitis  -     triamcinolone acetonide (KENALOG) 0.1 % ointment; Apply  to affected area two (2) times a day. use thin layer  Use dove or cetaphil cleanser, aquaphor, eucerin or cerave - appears to be eczema        Health Maintenance Due   Topic Date Due    DTaP/Tdap/Td series (1 - Tdap) 11/19/1984        Follow-up Disposition:  Return in about 4 months (around 5/3/2019), or anxiety, bp. Reviewed plan of care. Patient has provided input and agrees with goals. The nurse provided the patient and/or family with advanced directive information if needed and encouraged the patient to provide a copy to the office when available.

## 2019-01-03 NOTE — PROGRESS NOTES
Juani Leon  Identified pt with two pt identifiers(name and ). Chief Complaint   Patient presents with    Blood Pressure Check     Room 2B//     Medication Evaluation       1. Have you been to the ER, urgent care clinic since your last visit? Hospitalized since your last visit? NO    2. Have you seen or consulted any other health care providers outside of the [address] [place] [address] since your last visit? Include any pap smears or colon screening. NO      Provider notified of reason for visit, vitals and flowsheets obtained on patients. Patient received paperwork for advance directive during previous visit but has not completed at this time     Reviewed record In preparation for visit, huddled with provider and have obtained necessary documentation      Health Maintenance Due   Topic    DTaP/Tdap/Td series (1 - Tdap)    Influenza Age 5 to Adult        Wt Readings from Last 3 Encounters:   18 156 lb 9.6 oz (71 kg)   18 156 lb (70.8 kg)   18 156 lb (70.8 kg)     Temp Readings from Last 3 Encounters:   18 98.5 °F (36.9 °C) (Oral)   18 98.1 °F (36.7 °C)   18 97.5 °F (36.4 °C)     BP Readings from Last 3 Encounters:   18 128/82   18 (!) 176/102   18 (!) 182/103     Pulse Readings from Last 3 Encounters:   18 81   18 75   18 85     There were no vitals filed for this visit. Learning Assessment:  :     No flowsheet data found.     Depression Screening:  :     PHQ over the last two weeks 2018   Little interest or pleasure in doing things Not at all   Feeling down, depressed, irritable, or hopeless Not at all   Total Score PHQ 2 0   Trouble falling or staying asleep, or sleeping too much -   Feeling tired or having little energy -   Poor appetite, weight loss, or overeating -   Feeling bad about yourself - or that you are a failure or have let yourself or your family down -   Trouble concentrating on things such as school, work, reading, or watching TV -   Moving or speaking so slowly that other people could have noticed; or the opposite being so fidgety that others notice -   Thoughts of being better off dead, or hurting yourself in some way -   PHQ 9 Score -       Fall Risk Assessment:  :     Fall Risk Assessment, last 12 mths 11/26/2018   Able to walk? Yes   Fall in past 12 months? No       Abuse Screening:  :     Abuse Screening Questionnaire 11/26/2018   Do you ever feel afraid of your partner? N   Are you in a relationship with someone who physically or mentally threatens you? N   Is it safe for you to go home? Y       ADL Screening:  :     ADL Assessment 11/26/2018   Feeding yourself No Help Needed   Getting from bed to chair No Help Needed   Getting dressed No Help Needed   Bathing or showering No Help Needed   Walk across the room (includes cane/walker) No Help Needed   Using the telphone No Help Needed   Taking your medications No Help Needed   Preparing meals No Help Needed   Managing money (expenses/bills) No Help Needed   Moderately strenuous housework (laundry) No Help Needed   Shopping for personal items (toiletries/medicines) No Help Needed   Shopping for groceries No Help Needed   Driving No Help Needed   Climbing a flight of stairs No Help Needed   Getting to places beyond walking distances No Help Needed         Medication reconciliation up to date and corrected with patient at this time.

## 2019-01-03 NOTE — PATIENT INSTRUCTIONS
Use dove unscented bar soap or cetaphil cleanser  Use cerave lotion, aquaphor or eucerin cream     Atopic Dermatitis: Care Instructions  Your Care Instructions  Atopic dermatitis (also called eczema) is a skin problem that causes intense itching and a red, raised rash. In severe cases, the rash develops clear fluid-filled blisters. The rash is not contagious. People with this condition seem to have very sensitive immune systems that are likely to react to things that cause allergies. The immune system is the body's way of fighting infection. There is no cure for atopic dermatitis, but you may be able to control it with care at home. Follow-up care is a key part of your treatment and safety. Be sure to make and go to all appointments, and call your doctor if you are having problems. It's also a good idea to know your test results and keep a list of the medicines you take. How can you care for yourself at home? · Use moisturizer at least twice a day. · If your doctor prescribes a cream, use it as directed. If your doctor prescribes other medicine, take it exactly as directed. · Wash the affected area with water only. Soap can make dryness and itching worse. Pat dry. · Apply a moisturizer after bathing. Use a cream such as Lubriderm, Moisturel, or Cetaphil that does not irritate the skin or cause a rash. Apply the cream while your skin is still damp after lightly drying with a towel. · Use cold, wet cloths to reduce itching. · Keep cool, and stay out of the sun. · If itching affects your normal activities, an over-the-counter antihistamine, such as diphenhydramine (Benadryl) or loratadine (Claritin) may help. Read and follow all instructions on the label. When should you call for help?   Call your doctor now or seek immediate medical care if:    · Your rash gets worse and you have a fever.     · You have new blisters or bruises, or the rash spreads and looks like a sunburn.     · You have signs of infection, such as:  ? Increased pain, swelling, warmth, or redness. ? Red streaks leading from the rash. ? Pus draining from the rash. ? A fever.     · You have crusting or oozing sores.     · You have joint aches or body aches along with your rash.    Watch closely for changes in your health, and be sure to contact your doctor if:    · Your rash does not clear up after 2 to 3 weeks of home treatment.     · Itching interferes with your sleep or daily activities. Where can you learn more? Go to http://gallito-harleen.info/. Enter V245 in the search box to learn more about \"Atopic Dermatitis: Care Instructions. \"  Current as of: April 18, 2018  Content Version: 11.8  © 7903-7141 [employer], [employer]. Care instructions adapted under license by [employer] (which disclaims liability or warranty for this information). If you have questions about a medical condition or this instruction, always ask your healthcare professional. [place] [address] any warranty or liability for your use of this information.

## 2019-01-24 DIAGNOSIS — F41.9 ANXIETY: ICD-10-CM

## 2019-01-24 RX ORDER — FLUOXETINE 10 MG/1
10 TABLET ORAL DAILY
Qty: 90 TAB | Refills: 1 | Status: SHIPPED | OUTPATIENT
Start: 2019-01-24 | End: 2019-01-28 | Stop reason: SDUPTHER

## 2019-01-24 NOTE — TELEPHONE ENCOUNTER
Pt stated that her insurance denied the medication at [facility] and it needs to be sent to [facility].

## 2019-01-28 ENCOUNTER — TELEPHONE (OUTPATIENT)
Dept: INTERNAL MEDICINE CLINIC | Facility: CLINIC | Age: 54
End: 2019-01-28

## 2019-01-28 DIAGNOSIS — F41.9 ANXIETY: ICD-10-CM

## 2019-01-28 RX ORDER — FLUOXETINE 10 MG/1
10 TABLET ORAL DAILY
Qty: 14 TAB | Refills: 0 | Status: SHIPPED | OUTPATIENT
Start: 2019-01-28 | End: 2019-05-07 | Stop reason: SDUPTHER

## 2019-01-28 NOTE — TELEPHONE ENCOUNTER
PSR Luma sent a message on 01.24.19 for two weeks of pt\"s Medication of FLUoxetine (PROZAC) 10 mg tablet  to also be sent to [facility] due to having to wait on Mail Order Pharmacy. Pt wants to speak with the nurse to get this called in ASAP. Pt stated she has called a few times and is not sure why nothing is being taken care of for her. Italia Garcia

## 2019-05-07 ENCOUNTER — OFFICE VISIT (OUTPATIENT)
Dept: INTERNAL MEDICINE CLINIC | Facility: CLINIC | Age: 54
End: 2019-05-07

## 2019-05-07 VITALS
DIASTOLIC BLOOD PRESSURE: 83 MMHG | BODY MASS INDEX: 24.23 KG/M2 | HEIGHT: 66 IN | OXYGEN SATURATION: 96 % | HEART RATE: 81 BPM | TEMPERATURE: 97.8 F | RESPIRATION RATE: 16 BRPM | SYSTOLIC BLOOD PRESSURE: 130 MMHG | WEIGHT: 150.8 LBS

## 2019-05-07 DIAGNOSIS — L30.9 DERMATITIS: ICD-10-CM

## 2019-05-07 DIAGNOSIS — L03.119 CELLULITIS OF FOOT: ICD-10-CM

## 2019-05-07 DIAGNOSIS — F41.9 ANXIETY: Primary | ICD-10-CM

## 2019-05-07 DIAGNOSIS — R05.9 COUGH: ICD-10-CM

## 2019-05-07 RX ORDER — CEPHALEXIN 500 MG/1
500 CAPSULE ORAL 4 TIMES DAILY
Qty: 28 CAP | Refills: 0 | Status: SHIPPED | OUTPATIENT
Start: 2019-05-07 | End: 2019-05-14

## 2019-05-07 RX ORDER — FLUOXETINE 10 MG/1
10 TABLET ORAL DAILY
Qty: 90 TAB | Refills: 2 | Status: SHIPPED | OUTPATIENT
Start: 2019-05-07 | End: 2020-02-07

## 2019-05-07 RX ORDER — BENZONATATE 100 MG/1
100 CAPSULE ORAL
Qty: 21 CAP | Refills: 2 | Status: SHIPPED | OUTPATIENT
Start: 2019-05-07 | End: 2019-05-14

## 2019-05-07 NOTE — LETTER
5/7/2019 5:02 PM 
 
Ms. Margo Maurer [address] [facility] [address] [id number] Dear Margo Maurer: 
 
Please find your most recent results below. Resulted Orders XR CHEST PA LAT (Exam End: 5/7/2019  4:14 PM) Narrative CLINICAL HISTORY: Cough for 6 weeks INDICATION: Cough COMPARISON: 4/16/2015 FINDINGS:  
PA and lateral views of the chest are obtained. The cardiopericardial silhouette is within normal limits. There is no pleural 
effusion, pneumothorax or focal consolidation present. Impression IMPRESSION: No acute intrathoracic disease. RECOMMENDATIONS: 
Normal  
 
Please call me if you have any questions: [phone] Sincerely, Chio Mills MD

## 2019-05-07 NOTE — PROGRESS NOTES
HPI Ms. Alysa Rey is a 52y.o. year old female, she is seen today for follow up BP, anxiety with other concerns as well. Anxiety well controlled on prozac - no depression, unusual worry. Doesn't get upset as in the past.  
Got blisters a week ago from new shoes - has been putting neosporin on area, . Not better. Wondering if she needs antibiotics. New rash left inner arm for a few days, not itchy, no pain, not changing. No new soaps or detergents. Cough since November, has postnasal drip. Had sinusitis over the winter - no more sinus pressure or pain. Perhaps a minimal amount of sob. Not much. No f/c or sweats. Cough comes anytime, productive of clear/yellow tinged sputum. Chief Complaint Patient presents with  Blood Pressure Check Room 2A// NON fasting  Skin Problem  
  blisters on feet from new shoes  Cough  
  allergies x November // reqiest a chest xray today  Rash L arm x Saturday Prior to Admission medications Medication Sig Start Date End Date Taking? Authorizing Provider FLUoxetine (PROZAC) 10 mg tablet Take 1 Tab by mouth daily. 5/7/19  Yes Lisha Walton MD  
benzonatate (TESSALON) 100 mg capsule Take 1 Cap by mouth three (3) times daily as needed for Cough for up to 7 days. 5/7/19 5/14/19 Yes Lisha Walton MD  
cephALEXin [facility]) 500 mg capsule Take 1 Cap by mouth four (4) times daily for 7 days. 5/7/19 5/14/19 Yes Lisha Walton MD  
triamcinolone acetonide (KENALOG) 0.1 % ointment Apply  to affected area two (2) times a day. use thin layer 1/3/19  Yes Lisha Walton MD  
fluticasone [facility]) 50 mcg/actuation nasal spray 2 Sprays by Both Nostrils route daily. 11/12/18  Yes Fabienne Spaulding MD  
aspirin delayed-release 81 mg tablet Take 1 Tab by mouth daily. 1/24/18  Yes Rhonda Bob NP  
calcium-cholecalciferol, D3, (CALTRATE 600+D) tablet Take 1 Tab by mouth daily.    Yes Provider, Historical  
 cloNIDine HCl (CATAPRES) 0.1 mg tablet Take 1 Tab by mouth three (3) times daily as needed (for BP >160/100). 11/12/18   Ariana Mendez MD  
 
 
 
No Known Allergies REVIEW OF SYSTEMS: 
Per HPI PHYSICAL EXAM: 
Visit Vitals /83 (BP 1 Location: Left arm, BP Patient Position: Sitting) Pulse 81 Temp 97.8 °F (36.6 °C) (Oral) Resp 16 Ht 5' 6\" (1.676 m) Wt 150 lb 12.8 oz (68.4 kg) SpO2 96% BMI 24.34 kg/m² Constitutional: Appears well-developed and well-nourished. No distress. Skin: erythematous patch left upper inner arm approx 3x2cm HENT:  
Head: Normocephalic and atraumatic. Eyes: No scleral icterus. Mouth: OP clear without lesions, no pharyngeal exudate, +postnasal discharge Neck: no lad, no tm, supple Cardiovascular: Normal S1/S2, regular rhythm. No murmurs, rubs, or gallops. Pulmonary/Chest: Effort normal and breath sounds normal. No respiratory distress. No wheezes, rhonchi, or rales. Skin: open blisters medial aspect both feet with discharge, surrounding redness Ext: No edema. Neurological: Alert. Psychiatric: Normal mood and affect. Behavior is normal. 
  
Lab Results Component Value Date/Time Sodium 137 11/26/2018 02:29 PM  
 Potassium 4.5 11/28/2018 12:28 PM  
 Chloride 97 11/26/2018 02:29 PM  
 CO2 28 11/26/2018 02:29 PM  
 Glucose 90 11/26/2018 02:29 PM  
 BUN 7 11/26/2018 02:29 PM  
 Creatinine 0.68 11/26/2018 02:29 PM  
 BUN/Creatinine ratio 10 11/26/2018 02:29 PM  
 GFR est  11/26/2018 02:29 PM  
 GFR est non-AA 99 11/26/2018 02:29 PM  
 Calcium 10.2 11/26/2018 02:29 PM  
 Bilirubin, total <0.2 11/26/2018 02:29 PM  
 AST (SGOT) 20 11/26/2018 02:29 PM  
 Alk. phosphatase 102 11/26/2018 02:29 PM  
 Protein, total 8.1 11/26/2018 02:29 PM  
 Albumin 4.5 11/26/2018 02:29 PM  
 A-G Ratio 1.3 11/26/2018 02:29 PM  
 ALT (SGPT) 17 11/26/2018 02:29 PM  
 
No results found for: HBA1C, HGBE8, JGZ7ZPPZ, TJV1FSAH Lab Results Component Value Date/Time Cholesterol, total 206 (H) 01/24/2018 04:41 PM  
 HDL Cholesterol 45 01/24/2018 04:41 PM  
 LDL, calculated 134 (H) 01/24/2018 04:41 PM  
 VLDL, calculated 27 01/24/2018 04:41 PM  
 Triglyceride 137 01/24/2018 04:41 PM  
  
 
 
ASSESSMENT/PLAN Diagnoses and all orders for this visit: 
 
1. Anxiety 
-     FLUoxetine (PROZAC) 10 mg tablet; Take 1 Tab by mouth daily. Controlled on current regimen 2. Cough -     XR CHEST PA LAT; Future 
-     benzonatate (TESSALON) 100 mg capsule; Take 1 Cap by mouth three (3) times daily as needed for Cough for up to 7 days. Suspect upper airway cough syndrome - treat symptomatically, add antihistamine such as zyrtec, get cxr given duration of cough 3. Cellulitis of foot 
-     cephALEXin (KEFLEX) 500 mg capsule; Take 1 Cap by mouth four (4) times daily for 7 days. From infected blisters - avoid neosporin as may be causing local dermatitis 4. Dermatitis Left arm - ?eczema - try triamcinolone Health Maintenance Due Topic Date Due  
 DTaP/Tdap/Td series (1 - Tdap) 11/19/1984  PAP AKA CERVICAL CYTOLOGY  11/19/1984 Follow-up and Dispositions · Return in about 6 months (around 11/7/2019). Reviewed plan of care. Patient has provided input and agrees with goals. The nurse provided the patient and/or family with advanced directive information if needed and encouraged the patient to provide a copy to the office when available.

## 2019-05-07 NOTE — PROGRESS NOTES
Jama Mcardle  Identified pt with two pt identifiers(name and ). Chief Complaint Patient presents with  Blood Pressure Check Room 2A// NON fasting  Skin Problem  
  blisters on feet from new shoes  Cough  
  allergies x November // reqiest a chest xray today  Rash L arm x Saturday 1. Have you been to the ER, urgent care clinic since your last visit? Hospitalized since your last visit? NO 
 
2. Have you seen or consulted any other health care providers outside of the [address] [place] [address] since your last visit? Include any pap smears or colon screening. NO Provider notified of reason for visit, vitals and flowsheets obtained on patients. Patient received paperwork for advance directive during previous visit but has not completed at this time Reviewed record In preparation for visit, huddled with provider and have obtained necessary documentation Health Maintenance Due Topic  DTaP/Tdap/Td series (1 - Tdap)  PAP AKA CERVICAL CYTOLOGY Wt Readings from Last 3 Encounters:  
19 150 lb 12.8 oz (68.4 kg) 19 154 lb 12.8 oz (70.2 kg)  
18 156 lb 9.6 oz (71 kg) Temp Readings from Last 3 Encounters:  
19 98.1 °F (36.7 °C) (Oral)  
18 98.5 °F (36.9 °C) (Oral)  
18 98.1 °F (36.7 °C) BP Readings from Last 3 Encounters:  
19 127/82  
18 128/82  
18 (!) 176/102 Pulse Readings from Last 3 Encounters:  
19 83  
18 81  
18 75 Vitals:  
 19 1525 Resp: 16 Weight: 150 lb 12.8 oz (68.4 kg) Height: 5' 6\" (1.676 m) PainSc:   0 - No pain Learning Assessment: 
:  
 
No flowsheet data found. Depression Screening: 
:  
 
3 most recent PHQ Screens 2019 Little interest or pleasure in doing things Not at all Feeling down, depressed, irritable, or hopeless Not at all Total Score PHQ 2 0 Trouble falling or staying asleep, or sleeping too much -  
 Feeling tired or having little energy - Poor appetite, weight loss, or overeating - Feeling bad about yourself - or that you are a failure or have let yourself or your family down - Trouble concentrating on things such as school, work, reading, or watching TV - Moving or speaking so slowly that other people could have noticed; or the opposite being so fidgety that others notice - Thoughts of being better off dead, or hurting yourself in some way -  
PHQ 9 Score - Fall Risk Assessment: 
:  
 
Fall Risk Assessment, last 12 mths 11/26/2018 Able to walk? Yes Fall in past 12 months? No  
 
 
Abuse Screening: 
:  
 
Abuse Screening Questionnaire 11/26/2018 Do you ever feel afraid of your partner? Jaime Dunaway Are you in a relationship with someone who physically or mentally threatens you? Jaime Dunaway Is it safe for you to go home? Y  
 
 
ADL Screening: 
:  
 
ADL Assessment 11/26/2018 Feeding yourself No Help Needed Getting from bed to chair No Help Needed Getting dressed No Help Needed Bathing or showering No Help Needed Walk across the room (includes cane/walker) No Help Needed Using the telphone No Help Needed Taking your medications No Help Needed Preparing meals No Help Needed Managing money (expenses/bills) No Help Needed Moderately strenuous housework (laundry) No Help Needed Shopping for personal items (toiletries/medicines) No Help Needed Shopping for groceries No Help Needed Driving No Help Needed Climbing a flight of stairs No Help Needed Getting to places beyond walking distances No Help Needed Medication reconciliation up to date and corrected with patient at this time.
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
## 2019-08-15 ENCOUNTER — OFFICE VISIT (OUTPATIENT)
Dept: INTERNAL MEDICINE CLINIC | Facility: CLINIC | Age: 54
End: 2019-08-15

## 2019-08-15 VITALS
TEMPERATURE: 98.1 F | RESPIRATION RATE: 16 BRPM | DIASTOLIC BLOOD PRESSURE: 91 MMHG | SYSTOLIC BLOOD PRESSURE: 148 MMHG | OXYGEN SATURATION: 97 % | HEIGHT: 66 IN | BODY MASS INDEX: 24.17 KG/M2 | HEART RATE: 71 BPM | WEIGHT: 150.4 LBS

## 2019-08-15 DIAGNOSIS — R06.83 SNORING: ICD-10-CM

## 2019-08-15 DIAGNOSIS — R05.9 COUGH: ICD-10-CM

## 2019-08-15 DIAGNOSIS — J01.40 ACUTE NON-RECURRENT PANSINUSITIS: Primary | ICD-10-CM

## 2019-08-15 DIAGNOSIS — J34.2 DEVIATED SEPTUM: ICD-10-CM

## 2019-08-15 RX ORDER — DOXYCYCLINE 100 MG/1
100 TABLET ORAL 2 TIMES DAILY
Qty: 20 TAB | Refills: 0 | Status: SHIPPED | OUTPATIENT
Start: 2019-08-15 | End: 2019-08-25

## 2019-08-15 RX ORDER — BENZONATATE 200 MG/1
200 CAPSULE ORAL
Qty: 30 CAP | Refills: 0 | Status: SHIPPED | OUTPATIENT
Start: 2019-08-15 | End: 2020-03-18

## 2019-08-15 NOTE — PATIENT INSTRUCTIONS
Sinusitis: Care Instructions  Your Care Instructions    Sinusitis is an infection of the lining of the sinus cavities in your head. Sinusitis often follows a cold. It causes pain and pressure in your head and face. In most cases, sinusitis gets better on its own in 1 to 2 weeks. But some mild symptoms may last for several weeks. Sometimes antibiotics are needed. Follow-up care is a key part of your treatment and safety. Be sure to make and go to all appointments, and call your doctor if you are having problems. It's also a good idea to know your test results and keep a list of the medicines you take. How can you care for yourself at home? · Take an over-the-counter pain medicine, such as acetaminophen (Tylenol), ibuprofen (Advil, Motrin), or naproxen (Aleve). Read and follow all instructions on the label. · If the doctor prescribed antibiotics, take them as directed. Do not stop taking them just because you feel better. You need to take the full course of antibiotics. · Be careful when taking over-the-counter cold or flu medicines and Tylenol at the same time. Many of these medicines have acetaminophen, which is Tylenol. Read the labels to make sure that you are not taking more than the recommended dose. Too much acetaminophen (Tylenol) can be harmful. · Breathe warm, moist air from a steamy shower, a hot bath, or a sink filled with hot water. Avoid cold, dry air. Using a humidifier in your home may help. Follow the directions for cleaning the machine. · Use saline (saltwater) nasal washes to help keep your nasal passages open and wash out mucus and bacteria. You can buy saline nose drops at a grocery store or drugstore. Or you can make your own at home by adding 1 teaspoon of salt and 1 teaspoon of baking soda to 2 cups of distilled water. If you make your own, fill a bulb syringe with the solution, insert the tip into your nostril, and squeeze gently. Beuford Saver your nose.   · Put a hot, wet towel or a warm gel pack on your face 3 or 4 times a day for 5 to 10 minutes each time. · Try a decongestant nasal spray like oxymetazoline (Afrin). Do not use it for more than 3 days in a row. Using it for more than 3 days can make your congestion worse. When should you call for help? Call your doctor now or seek immediate medical care if:    · You have new or worse swelling or redness in your face or around your eyes.     · You have a new or higher fever.    Watch closely for changes in your health, and be sure to contact your doctor if:    · You have new or worse facial pain.     · The mucus from your nose becomes thicker (like pus) or has new blood in it.     · You are not getting better as expected. Where can you learn more? Go to http://gallito-harleen.info/. Enter M303 in the search box to learn more about \"Sinusitis: Care Instructions. \"  Current as of: October 21, 2018  Content Version: 12.1  © 0866-6717 [employer]. Care instructions adapted under license by [employer] (which disclaims liability or warranty for this information). If you have questions about a medical condition or this instruction, always ask your healthcare professional. [place] [address] any warranty or liability for your use of this information. Deviated Septum  The nose is divided into 2 nostrils by a wall called the \"septum. \" The septum is made up of bone and a rubbery material called \"cartilage. \" When the septum is crooked, it is called a \"deviated septum. \"    The septum is fragile. Even a slight bump or injury to the nose can make the septum crooked. In babies, the septum can get damaged during birth. What are the symptoms of a deviated septum? Symptoms of a deviated septum include:    ?Stuffy nose, especially on one side    ? Trouble sleeping    ? Trouble breathing through the nose    Many people have a slightly crooked septum that causes no problems.     Should I see a doctor or nurse?  If your nose always feels stuffy and you have trouble breathing, you should see your doctor or nurse. There are many things that can cause these symptoms and it is important to find out what the cause is. Will I need tests? Maybe. Your doctor or nurse will talk to you about your symptoms and do an exam. He or she will look into your nose using a tool with a light on it. Your doctor or nurse might also suggest that you have tests, such as:    ? Nasal endoscopy - During a nasal endoscopy, the doctor uses a flexible tube called an \"endoscope\" to look deep into your nose to see if there are any problems. ?CT scan - A CT scan is an imaging test that takes a picture of the inside of your nose and sinuses. The sinuses are hollow areas in the bones of the face, near the nose. How is a deviated septum treated? Many people with a deviated septum do not need treatment. But if you have a badly deviated septum, your doctor or nurse might suggest surgery. The surgery, called a \"septoplasty,\" straightens the crooked septum.

## 2019-08-15 NOTE — PROGRESS NOTES
Clem Loza  Identified pt with two pt identifiers(name and ). Chief Complaint   Patient presents with    URI     started 2 weeks ago    Headache     Tylenol sinus, mucinex    Sinus Pain       Reviewed record In preparation for visit and have obtained necessary documentation. Has info on advanced directive but has not filled them out. 1. Have you been to the ER, urgent care clinic or hospitalized since your last visit? No     2. Have you seen or consulted any other health care providers outside of the [address] [place] [address] since your last visit? Include any pap smears or colon screening. No    Vitals reviewed with provider. Health Maintenance reviewed: There are no preventive care reminders to display for this patient. [age] yo  Wt Readings from Last 3 Encounters:   08/15/19 150 lb 6.4 oz (68.2 kg)   19 150 lb 12.8 oz (68.4 kg)   19 154 lb 12.8 oz (70.2 kg)   ur partner? N   Are you in a relationship with someone who physically or mentally threatens you? N   Is it safe for you to go home? Y       Temp Readings from Last 3 Encounters:   08/15/19 98.1 °F (36.7 °C) (Oral)   19 97.8 °F (36.6 °C) (Oral)   19 98.1 °F (36.7 °C) (Oral)                           BP Readings from Last 3 Encounters:   08/15/19 (!) 148/91   19 130/83   19 127/82                                   Ge  Vitals:    08/15/19 1256   BP: (!) 148/91   Pulse: 71   Resp: 16   Temp: 98.1 °F (36.7 °C)   TempSrc: Oral   SpO2: 97%   Weight: 150 lb 6.4 oz (68.2 kg)   Height: 5' 6\" (1.676 m)   PainSc:   5   PainLoc: Head   tting to [employer] data found. laces beyond wal  3 most recent PHQ Screens 2019   Little interest or pleasure in doing things Not at all   Feeling down, depressed, irritable, or hopeless Not at all   Total Score PHQ 2 0   Trouble falling or staying asleep, or sleeping too much -   Feeling tired or having little energy -   Poor appetite, weight loss, or overeating -   Feeling bad about yourself - or that you are a failure or have let yourself or your family down -   Trouble concentrating on things such as school, work, reading, or watching TV -   Moving or speaking so slowly that other people could have noticed; or the opposite being so fidgety that others notice -   Thoughts of being better off dead, or hurting yourself in some way -   PHQ 9 Score -

## 2020-02-06 DIAGNOSIS — F41.9 ANXIETY: ICD-10-CM

## 2020-02-07 RX ORDER — FLUOXETINE 10 MG/1
TABLET ORAL
Qty: 90 TAB | Refills: 0 | Status: SHIPPED | OUTPATIENT
Start: 2020-02-07 | End: 2020-03-18 | Stop reason: SDUPTHER

## 2020-03-18 ENCOUNTER — OFFICE VISIT (OUTPATIENT)
Dept: INTERNAL MEDICINE CLINIC | Facility: CLINIC | Age: 55
End: 2020-03-18

## 2020-03-18 VITALS
HEART RATE: 84 BPM | DIASTOLIC BLOOD PRESSURE: 90 MMHG | HEIGHT: 66 IN | BODY MASS INDEX: 23.88 KG/M2 | WEIGHT: 148.6 LBS | TEMPERATURE: 98 F | RESPIRATION RATE: 14 BRPM | SYSTOLIC BLOOD PRESSURE: 142 MMHG | OXYGEN SATURATION: 96 %

## 2020-03-18 DIAGNOSIS — F41.9 ANXIETY: Primary | ICD-10-CM

## 2020-03-18 DIAGNOSIS — J06.9 UPPER RESPIRATORY TRACT INFECTION, UNSPECIFIED TYPE: ICD-10-CM

## 2020-03-18 DIAGNOSIS — R03.0 ELEVATED BP WITHOUT DIAGNOSIS OF HYPERTENSION: ICD-10-CM

## 2020-03-18 RX ORDER — CLOBETASOL PROPIONATE 0.5 MG/G
CREAM TOPICAL
COMMUNITY
Start: 2020-02-20 | End: 2020-03-18

## 2020-03-18 RX ORDER — FLUOXETINE 10 MG/1
TABLET ORAL
Qty: 90 TAB | Refills: 3 | Status: SHIPPED | OUTPATIENT
Start: 2020-03-18 | End: 2020-03-18 | Stop reason: SDUPTHER

## 2020-03-18 RX ORDER — FLUOXETINE 10 MG/1
TABLET ORAL
Qty: 90 TAB | Refills: 3 | Status: SHIPPED | OUTPATIENT
Start: 2020-03-18 | End: 2021-03-31

## 2020-03-18 RX ORDER — CLOBETASOL PROPIONATE 0.5 MG/G
CREAM TOPICAL
Qty: 15 G | Refills: 1
Start: 2020-03-18 | End: 2020-11-19

## 2020-03-18 NOTE — PROGRESS NOTES
HPI  Ms. Carlos Prescott is a 62y.o. year old female, she is seen today for follow up anxiety. In addition c/o cough. Has had cough for about a week, no sob except with coughing fit. Cough is minimally productive of white sputum. Also headache with coughing. Today symptoms are improved. Cough is worse at night, but last night was better. No rhinorrhea or nasal congestion. Has been taking flonase and zyrtec for 3-4 weeks. Hasn't ever needed to take clonidine for high BP. Hasn't needed to check bp at home as it had been well controlled. Anxiety is well controlled with prozac. 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 Chief Complaint   Patient presents with    Anxiety     Room 2A // Derm last week has suspicious lesion removed from L calf // [facility]- Dr Sushil Flores // Colonoscopy done w/ [facility]     Cough     sinus pressure// denies fever // using Mucinex, cloricidin, flonase, zyrtec PRN        Prior to Admission medications    Medication Sig Start Date End Date Taking? Authorizing Provider   clobetasoL (TEMOVATE) 0.05 % topical cream JASON 1 APPLICATION    AA   ON  ARMS   AND LEGS   BID  FOR 14   DAYS 3/18/20  Yes Lindsay Dean MD   FLUoxetine (PROzac) 10 mg tablet TAKE 1 TABLET BY MOUTH  DAILY 3/18/20  Yes Lindsay Dean MD   fluticasone [facility]) 50 mcg/actuation nasal spray 2 Sprays by Both Nostrils route daily. 11/12/18  Yes Kelly Aleman MD   aspirin delayed-release 81 mg tablet Take 1 Tab by mouth daily. 1/24/18  Yes Rhonda Bob NP   calcium-cholecalciferol, D3, (CALTRATE 600+D) tablet Take 1 Tab by mouth daily.    Yes Provider, Historical   clobetasoL (TEMOVATE) 0.05 % topical cream JASON 1 APPLICATION    AA   ON  ARMS   AND LEGS   BID  FOR 14   DAYS 2/20/20 3/18/20  Provider, Historical   FLUoxetine (PROzac) 10 mg tablet TAKE 1 TABLET BY MOUTH  DAILY 3/18/20 3/18/20  Lindsay Dean MD   FLUoxetine (PROZAC) 10 mg tablet TAKE 1 TABLET BY MOUTH  DAILY 2/7/20 3/18/20  Domonique Chamberlain MD   benzonatate (TESSALON) 200 mg capsule Take 1 Cap by mouth three (3) times daily as needed for Cough. 8/15/19 3/18/20  Cari Noguera NP   triamcinolone acetonide (KENALOG) 0.1 % ointment Apply  to affected area two (2) times a day. use thin layer 1/3/19 3/18/20  Domonique Chamberlain MD   cloNIDine HCl (CATAPRES) 0.1 mg tablet Take 1 Tab by mouth three (3) times daily as needed (for BP >160/100). 11/12/18 3/18/20  Matias Buenrostro MD         No Known Allergies      REVIEW OF SYSTEMS:  Per HPI    PHYSICAL EXAM:  Visit Vitals  /90   Pulse 84   Temp 98 °F (36.7 °C) (Oral)   Resp 14   Ht 5' 6\" (1.676 m)   Wt 148 lb 9.6 oz (67.4 kg)   SpO2 96%   BMI 23.98 kg/m²     Constitutional: Appears well-developed and well-nourished. No distress. HENT:   Head: Normocephalic and atraumatic. Eyes: No scleral icterus. Mouth: OP clear without lesions, no pharyngeal exudate  Neck: no lad, no tm, supple  Cardiovascular: Normal S1/S2, regular rhythm. No murmurs, rubs, or gallops. Pulmonary/Chest: Effort normal and breath sounds normal. No respiratory distress. No wheezes, rhonchi, or rales. Ext: No edema. Neurological: Alert. Psychiatric: Normal mood and affect. Behavior is normal.     Lab Results   Component Value Date/Time    Sodium 137 11/26/2018 02:29 PM    Potassium 4.5 11/28/2018 12:28 PM    Chloride 97 11/26/2018 02:29 PM    CO2 28 11/26/2018 02:29 PM    Glucose 90 11/26/2018 02:29 PM    BUN 7 11/26/2018 02:29 PM    Creatinine 0.68 11/26/2018 02:29 PM    BUN/Creatinine ratio 10 11/26/2018 02:29 PM    GFR est  11/26/2018 02:29 PM    GFR est non-AA 99 11/26/2018 02:29 PM    Calcium 10.2 11/26/2018 02:29 PM    Bilirubin, total <0.2 11/26/2018 02:29 PM    AST (SGOT) 20 11/26/2018 02:29 PM    Alk.  phosphatase 102 11/26/2018 02:29 PM    Protein, total 8.1 11/26/2018 02:29 PM    Albumin 4.5 11/26/2018 02:29 PM    A-G Ratio 1.3 11/26/2018 02:29 PM    ALT (SGPT) 17 11/26/2018 02:29 PM     No results found for: HBA1C, HGBE8, CGW5LMZR, LXA7VNVF   Lab Results   Component Value Date/Time    Cholesterol, total 206 (H) 01/24/2018 04:41 PM    HDL Cholesterol 45 01/24/2018 04:41 PM    LDL, calculated 134 (H) 01/24/2018 04:41 PM    VLDL, calculated 27 01/24/2018 04:41 PM    Triglyceride 137 01/24/2018 04:41 PM          ASSESSMENT/PLAN  Diagnoses and all orders for this visit:    1. Anxiety  -     FLUoxetine (PROzac) 10 mg tablet; TAKE 1 TABLET BY MOUTH  DAILY  Controlled on current regimen, continue   2. Upper respiratory tract infection, unspecified type  Likely viral - continue symptomatic treatment  3. Elevated BP without diagnosis of hypertension  Monitor bp at home - normally well controlled           Health Maintenance Due   Topic Date Due    Colonoscopy  11/19/1981    PAP AKA CERVICAL CYTOLOGY  11/19/1984        Follow-up and Dispositions    · Return in about 6 months (around 9/18/2020) for med eval.            Reviewed plan of care. Patient has provided input and agrees with goals. The nurse provided the patient and/or family with advanced directive information if needed and encouraged the patient to provide a copy to the office when available.

## 2020-11-16 ENCOUNTER — TELEPHONE (OUTPATIENT)
Dept: INTERNAL MEDICINE CLINIC | Age: 55
End: 2020-11-16

## 2020-11-16 NOTE — TELEPHONE ENCOUNTER
Verified patients name and date of birth. Spoke with patient. She has had BP readings of 154/105,134/97,188/112,139/91. States she was given clonidine at the ER on [address] and has 3 pills left. Her daughter is being induced tomorrow and she cannot come in tomorrow or Wednesday. Scheduled for in person visit 11/19. Advised ER/place call to our office if Bp remains consistently high. Patient stated understanding.

## 2020-11-16 NOTE — TELEPHONE ENCOUNTER
Pt called needed an appt either Thursday or Friday but refused the virtual visit. Pt stated that she went to the [facility] emergency center on [address] over the weekend due to her bp being edin. She stated that on Friday it was 174/117 and then on Saturday is was 203/113.

## 2020-11-18 NOTE — PROGRESS NOTES
HPI  Martha Liu is a 62y.o. year old female patient of Christos Marks MD who presents with c/o ED fu for BP. Pt has history of has Anxiety on their problem list..      Pt states she was seen at the ED on  [facility]  for elevated BP and given clonidine to take if BP over 200/100. CMP and CBC WNL. EKG NSR, non -ischemic.   /98 at ED.  174/117 and then on Saturday is was 203/113 at home per pt prior to ED. States in the past BP has been high but thought due to anxiety, has never taken medication for it. Her daughter was induced day after she was in the ED, new jenny boy. Has been checking BP at home since , varies from 134/97 to 188/112, has taken clonidine 3 times when it was high. Has family hx of HTN and heart disease. Denies chest pain, pressure, SOB or GABRIEL. Sometimes has slight headache, no double vision, dizziness. Patient Active Problem List   Diagnosis Code    Anxiety F41.9     No past medical history on file.   Past Surgical History:   Procedure Laterality Date    HX TONSILLECTOMY      SC EXTRAC ERUPTED TOOTH/EXPOSED ROOT Bilateral 3/30/2016    Patient Has Implants     Social History     Socioeconomic History    Marital status:      Spouse name: Not on file    Number of children: Not on file    Years of education: Not on file    Highest education level: Not on file   Tobacco Use    Smoking status: Former Smoker     Last attempt to quit: 2014     Years since quittin.9    Smokeless tobacco: Never Used   Substance and Sexual Activity    Alcohol use: Yes     Comment: few drinks per month    Drug use: No    Sexual activity: Yes     Partners: Male     Birth control/protection: None     Family History   Problem Relation Age of Onset    Hypertension Mother     Heart Disease Father 62        MI    Lung Disease Father         smoker, emphysema    No Known Problems Sister     Hypertension Brother     Diabetes Paternal Grandmother No Known Allergies    MEDICATIONS  Current Outpatient Medications   Medication Sig    cloNIDine HCL (CATAPRES) 0.1 mg tablet TK 1 T PO BID    trospium (SANCTURA) 20 mg tablet TK 1 T PO BID    amLODIPine (NORVASC) 5 mg tablet Take 1 Tab by mouth daily.  FLUoxetine (PROzac) 10 mg tablet TAKE 1 TABLET BY MOUTH  DAILY    fluticasone (FLONASE) 50 mcg/actuation nasal spray 2 Sprays by Both Nostrils route daily.  calcium-cholecalciferol, D3, (CALTRATE 600+D) tablet Take 1 Tab by mouth daily. No current facility-administered medications for this visit. REVIEW OF SYSTEMS  Per HPI        Visit Vitals  BP (!) 150/95   Pulse 96   Temp 98.2 °F (36.8 °C) (Oral)   Resp 12   Ht 5' 6\" (1.676 m)   Wt 152 lb (68.9 kg)   SpO2 99%   BMI 24.53 kg/m²         General: Well-developed, well-nourished. In no distress. A&O x 3. Head: Normocephalic, atraumatic. Eyes: Conjunctiva clear. Neck: Supple, symmetrical, trachea midline, no lymphadenopathy, no carotid bruits, no JVD, thyroid: not enlarged, symmetric, no tenderness/mass/nodules. Lungs: Clear to auscultation bilaterally. No crackles or wheezes. No use of accessory muscles. Speaks in full sentences without SOB. Chest Wall: No tenderness or deformity. Heart: RRR, normal S1 and S2, no murmur, click, rub, or gallop. Skin: No rashes or lesions. Neurovasc: No edema appreciated. Dorsalis pedis pulses are 2+ on the right side, and 2+ on the left side. Posterior tibial pulses are 2+ on the right side, and 2+ on the left side. Musculoskeletal: Gait normal.   Psychiatric: Normal mood and affect. Behavior is normal.       No results found for any visits on 11/19/20. ASSESSMENT and PLAN  Diagnoses and all orders for this visit:    1. Essential hypertension  -     amLODIPine (NORVASC) 5 mg tablet; Take 1 Tab by mouth daily.   START NEW MED  Parameters to call below  Fu in 1 month          Patient Instructions     Continue to check blood pressures at home once every other day after sitting quietly for 15 minutes. Notify us if it is consistently over 150/90 after 1 week of taking the medication. Notify our office if it is low (less than 100/50) or you are feeling lightheaded or dizzy. Notify us if it is high 180/100 or you are feeling bad. If we have not checked your blood pressure device in the past 2 years, please bring it at your next visit to be sure it correlates with ours. High Blood Pressure: Care Instructions  Overview     It's normal for blood pressure to go up and down throughout the day. But if it stays up, you have high blood pressure. Another name for high blood pressure is hypertension. Despite what a lot of people think, high blood pressure usually doesn't cause headaches or make you feel dizzy or lightheaded. It usually has no symptoms. But it does increase your risk of stroke, heart attack, and other problems. You and your doctor will talk about your risks of these problems based on your blood pressure. Your doctor will give you a goal for your blood pressure. Your goal will be based on your health and your age. Lifestyle changes, such as eating healthy and being active, are always important to help lower blood pressure. You might also take medicine to reach your blood pressure goal.  Follow-up care is a key part of your treatment and safety. Be sure to make and go to all appointments, and call your doctor if you are having problems. It's also a good idea to know your test results and keep a list of the medicines you take. How can you care for yourself at home? Medical treatment  · If you stop taking your medicine, your blood pressure will go back up. You may take one or more types of medicine to lower your blood pressure. Be safe with medicines. Take your medicine exactly as prescribed. Call your doctor if you think you are having a problem with your medicine.   · Talk to your doctor before you start taking aspirin every day. Aspirin can help certain people lower their risk of a heart attack or stroke. But taking aspirin isn't right for everyone, because it can cause serious bleeding. · See your doctor regularly. You may need to see the doctor more often at first or until your blood pressure comes down. · If you are taking blood pressure medicine, talk to your doctor before you take decongestants or anti-inflammatory medicine, such as ibuprofen. Some of these medicines can raise blood pressure. · Learn how to check your blood pressure at home. Lifestyle changes  · Stay at a healthy weight. This is especially important if you put on weight around the waist. Losing even 10 pounds can help you lower your blood pressure. · If your doctor recommends it, get more exercise. Walking is a good choice. Bit by bit, increase the amount you walk every day. Try for at least 30 minutes on most days of the week. You also may want to swim, bike, or do other activities. · Avoid or limit alcohol. Talk to your doctor about whether you can drink any alcohol. · Try to limit how much sodium you eat to less than 2,300 milligrams (mg) a day. Your doctor may ask you to try to eat less than 1,500 mg a day. · Eat plenty of fruits (such as bananas and oranges), vegetables, legumes, whole grains, and low-fat dairy products. · Lower the amount of saturated fat in your diet. Saturated fat is found in animal products such as milk, cheese, and meat. Limiting these foods may help you lose weight and also lower your risk for heart disease. · Do not smoke. Smoking increases your risk for heart attack and stroke. If you need help quitting, talk to your doctor about stop-smoking programs and medicines. These can increase your chances of quitting for good. When should you call for help? Call  911 anytime you think you may need emergency care.  This may mean having symptoms that suggest that your blood pressure is causing a serious heart or blood vessel problem. Your blood pressure may be over 180/120. For example, call 911 if:    · You have symptoms of a heart attack. These may include:  ? Chest pain or pressure, or a strange feeling in the chest.  ? Sweating. ? Shortness of breath. ? Nausea or vomiting. ? Pain, pressure, or a strange feeling in the back, neck, jaw, or upper belly or in one or both shoulders or arms. ? Lightheadedness or sudden weakness. ? A fast or irregular heartbeat.     · You have symptoms of a stroke. These may include:  ? Sudden numbness, tingling, weakness, or loss of movement in your face, arm, or leg, especially on only one side of your body. ? Sudden vision changes. ? Sudden trouble speaking. ? Sudden confusion or trouble understanding simple statements. ? Sudden problems with walking or balance. ? A sudden, severe headache that is different from past headaches.     · You have severe back or belly pain. Do not wait until your blood pressure comes down on its own. Get help right away. Call your doctor now or seek immediate care if:    · Your blood pressure is much higher than normal (such as 180/120 or higher), but you don't have symptoms.     · You think high blood pressure is causing symptoms, such as:  ? Severe headache.  ? Blurry vision. Watch closely for changes in your health, and be sure to contact your doctor if:    · Your blood pressure measures higher than your doctor recommends at least 2 times. That means the top number is higher or the bottom number is higher, or both.     · You think you may be having side effects from your blood pressure medicine. Where can you learn more? Go to http://www.gray.com/  Enter [id number] in the search box to learn more about \"High Blood Pressure: Care Instructions. \"  Current as of: December 16, 2019               Content Version: 12.6  © 1668-9582 [employer].    Care instructions adapted under license by [employer] (which disclaims liability or warranty for this information). If you have questions about a medical condition or this instruction, always ask your healthcare professional. [place] [address] any warranty or liability for your use of this information. Please keep your follow-up appointment with iBllie Lou MD.         Health Maintenance Due   Topic Date Due    DTaP/Tdap/Td series (1 - Tdap) 11/19/1984    PAP AKA CERVICAL CYTOLOGY  11/19/1984    Colorectal Cancer Screening Combo  11/19/2013    Flu Vaccine (1) 09/01/2020       I have discussed the diagnosis with the patient and the intended plan as seen in the above orders. Patient is in agreement. The patient has received an after-visit summary and questions were answered concerning future plans. I have discussed medication side effects and warnings with the patient as well. Warning signs for the above conditions were discussed including when to call our office or go to the emergency room. The nurse provided the patient and/or family with advanced directive information if needed and encouraged the patient to provide a copy to the office when available.

## 2020-11-19 ENCOUNTER — OFFICE VISIT (OUTPATIENT)
Dept: INTERNAL MEDICINE CLINIC | Age: 55
End: 2020-11-19
Payer: COMMERCIAL

## 2020-11-19 VITALS
RESPIRATION RATE: 12 BRPM | DIASTOLIC BLOOD PRESSURE: 95 MMHG | TEMPERATURE: 98.2 F | BODY MASS INDEX: 24.43 KG/M2 | SYSTOLIC BLOOD PRESSURE: 150 MMHG | HEIGHT: 66 IN | HEART RATE: 96 BPM | WEIGHT: 152 LBS | OXYGEN SATURATION: 99 %

## 2020-11-19 DIAGNOSIS — I10 ESSENTIAL HYPERTENSION: Primary | ICD-10-CM

## 2020-11-19 PROCEDURE — 99213 OFFICE O/P EST LOW 20 MIN: CPT | Performed by: NURSE PRACTITIONER

## 2020-11-19 RX ORDER — AMLODIPINE BESYLATE 5 MG/1
5 TABLET ORAL DAILY
Qty: 30 TAB | Refills: 1 | Status: SHIPPED | OUTPATIENT
Start: 2020-11-19 | End: 2020-12-21 | Stop reason: ALTCHOICE

## 2020-11-19 RX ORDER — TROSPIUM CHLORIDE 20 MG/1
TABLET, FILM COATED ORAL
COMMUNITY
Start: 2020-10-06

## 2020-11-19 RX ORDER — CLONIDINE HYDROCHLORIDE 0.1 MG/1
TABLET ORAL
COMMUNITY
Start: 2020-11-14 | End: 2020-12-21

## 2020-11-19 NOTE — PATIENT INSTRUCTIONS
Continue to check blood pressures at home once every other day after sitting quietly for 15 minutes. Notify us if it is consistently over 150/90 after 1 week of taking the medication. Notify our office if it is low (less than 100/50) or you are feeling lightheaded or dizzy. Notify us if it is high 180/100 or you are feeling bad. If we have not checked your blood pressure device in the past 2 years, please bring it at your next visit to be sure it correlates with ours. High Blood Pressure: Care Instructions Overview It's normal for blood pressure to go up and down throughout the day. But if it stays up, you have high blood pressure. Another name for high blood pressure is hypertension. Despite what a lot of people think, high blood pressure usually doesn't cause headaches or make you feel dizzy or lightheaded. It usually has no symptoms. But it does increase your risk of stroke, heart attack, and other problems. You and your doctor will talk about your risks of these problems based on your blood pressure. Your doctor will give you a goal for your blood pressure. Your goal will be based on your health and your age. Lifestyle changes, such as eating healthy and being active, are always important to help lower blood pressure. You might also take medicine to reach your blood pressure goal. 
Follow-up care is a key part of your treatment and safety. Be sure to make and go to all appointments, and call your doctor if you are having problems. It's also a good idea to know your test results and keep a list of the medicines you take. How can you care for yourself at home? Medical treatment · If you stop taking your medicine, your blood pressure will go back up. You may take one or more types of medicine to lower your blood pressure. Be safe with medicines. Take your medicine exactly as prescribed. Call your doctor if you think you are having a problem with your medicine. · Talk to your doctor before you start taking aspirin every day. Aspirin can help certain people lower their risk of a heart attack or stroke. But taking aspirin isn't right for everyone, because it can cause serious bleeding. · See your doctor regularly. You may need to see the doctor more often at first or until your blood pressure comes down. · If you are taking blood pressure medicine, talk to your doctor before you take decongestants or anti-inflammatory medicine, such as ibuprofen. Some of these medicines can raise blood pressure. · Learn how to check your blood pressure at home. Lifestyle changes · Stay at a healthy weight. This is especially important if you put on weight around the waist. Losing even 10 pounds can help you lower your blood pressure. · If your doctor recommends it, get more exercise. Walking is a good choice. Bit by bit, increase the amount you walk every day. Try for at least 30 minutes on most days of the week. You also may want to swim, bike, or do other activities. · Avoid or limit alcohol. Talk to your doctor about whether you can drink any alcohol. · Try to limit how much sodium you eat to less than 2,300 milligrams (mg) a day. Your doctor may ask you to try to eat less than 1,500 mg a day. · Eat plenty of fruits (such as bananas and oranges), vegetables, legumes, whole grains, and low-fat dairy products. · Lower the amount of saturated fat in your diet. Saturated fat is found in animal products such as milk, cheese, and meat. Limiting these foods may help you lose weight and also lower your risk for heart disease. · Do not smoke. Smoking increases your risk for heart attack and stroke. If you need help quitting, talk to your doctor about stop-smoking programs and medicines. These can increase your chances of quitting for good. When should you call for help? Call  911 anytime you think you may need emergency care.  This may mean having symptoms that suggest that your blood pressure is causing a serious heart or blood vessel problem. Your blood pressure may be over 180/120. For example, call 911 if: 
  · You have symptoms of a heart attack. These may include: 
? Chest pain or pressure, or a strange feeling in the chest. 
? Sweating. ? Shortness of breath. ? Nausea or vomiting. ? Pain, pressure, or a strange feeling in the back, neck, jaw, or upper belly or in one or both shoulders or arms. ? Lightheadedness or sudden weakness. ? A fast or irregular heartbeat.  
  · You have symptoms of a stroke. These may include: 
? Sudden numbness, tingling, weakness, or loss of movement in your face, arm, or leg, especially on only one side of your body. ? Sudden vision changes. ? Sudden trouble speaking. ? Sudden confusion or trouble understanding simple statements. ? Sudden problems with walking or balance. ? A sudden, severe headache that is different from past headaches.  
  · You have severe back or belly pain. Do not wait until your blood pressure comes down on its own. Get help right away. Call your doctor now or seek immediate care if: 
  · Your blood pressure is much higher than normal (such as 180/120 or higher), but you don't have symptoms.  
  · You think high blood pressure is causing symptoms, such as: 
? Severe headache. 
? Blurry vision. Watch closely for changes in your health, and be sure to contact your doctor if: 
  · Your blood pressure measures higher than your doctor recommends at least 2 times. That means the top number is higher or the bottom number is higher, or both.  
  · You think you may be having side effects from your blood pressure medicine. Where can you learn more? Go to http://www.gray.com/ Enter F834 in the search box to learn more about \"High Blood Pressure: Care Instructions. \" Current as of: December 16, 2019               Content Version: 12.6 © 8142-8653 [employer]. Care instructions adapted under license by [employer] (which disclaims liability or warranty for this information). If you have questions about a medical condition or this instruction, always ask your healthcare professional. [address] any warranty or liability for your use of this information.

## 2020-11-19 NOTE — PROGRESS NOTES
Yon Hernandez  Identified pt with two pt identifiers(name and ). Chief Complaint   Patient presents with   [address] [place] [address] ED Follow-up     [facility] 2020       Reviewed record In preparation for visit and have obtained necessary documentation. Given info on advanced directives. 1. Have you been to the ER, urgent care clinic or hospitalized since your last visit? [facility] EMERGENCY [facility] ER 2020    2. Have you seen or consulted any other health care providers outside of the [address] [place] [address] since your last visit? Include any pap smears or colon screening. No    Vitals reviewed with provider. Health Maintenance reviewed:     Health Maintenance Due   Topic    DTaP/Tdap/Td series (1 - Tdap)    PAP AKA CERVICAL CYTOLOGY     Colorectal Cancer Screening Combo           Wt Readings from Last 3 Encounters:   20 152 lb (68.9 kg)   20 148 lb 9.6 oz (67.4 kg)   08/15/19 150 lb 6.4 oz (68.2 kg)        Temp Readings from Last 3 Encounters:   20 98.2 °F (36.8 °C) (Oral)   20 98 °F (36.7 °C) (Oral)   08/15/19 98.1 °F (36.7 °C) (Oral)        BP Readings from Last 3 Encounters:   20 (!) 155/97   20 142/90   08/15/19 (!) 148/91        Pulse Readings from Last 3 Encounters:   20 96   20 84   08/15/19 71        Vitals:    20 0938 20 0942   BP: (!) 162/100 (!) 155/97   Pulse: 96    Resp: 12    Temp: 98.2 °F (36.8 °C)    TempSrc: Oral    SpO2: 99%    Weight: 152 lb (68.9 kg)    Height: 5' 6\" (1.676 m)    PainSc:   1           Learning Assessment:   :     No flowsheet data found.      Depression Screening:   :       3 most recent PHQ Screens 2020   Little interest or pleasure in doing things Not at all   Feeling down, depressed, irritable, or hopeless Not at all   Total Score PHQ 2 0   Trouble falling or staying asleep, or sleeping too much -   Feeling tired or having little energy -   Poor appetite, weight loss, or overeating -   Feeling bad about yourself - or that you are a failure or have let yourself or your family down -   Trouble concentrating on things such as school, work, reading, or watching TV -   Moving or speaking so slowly that other people could have noticed; or the opposite being so fidgety that others notice -   Thoughts of being better off dead, or hurting yourself in some way -   PHQ 9 Score -        Fall Risk Assessment:   :       Fall Risk Assessment, last 12 mths 11/26/2018   Able to walk? Yes   Fall in past 12 months? No        Abuse Screening:   :       Abuse Screening Questionnaire 11/26/2018   Do you ever feel afraid of your partner? N   Are you in a relationship with someone who physically or mentally threatens you? N   Is it safe for you to go home?  Y        ADL Screening:   :       ADL Assessment 11/26/2018   Feeding yourself No Help Needed   Getting from bed to chair No Help Needed   Getting dressed No Help Needed   Bathing or showering No Help Needed   Walk across the room (includes cane/walker) No Help Needed   Using the telphone No Help Needed   Taking your medications No Help Needed   Preparing meals No Help Needed   Managing money (expenses/bills) No Help Needed   Moderately strenuous housework (laundry) No Help Needed   Shopping for personal items (toiletries/medicines) No Help Needed   Shopping for groceries No Help Needed   Driving No Help Needed   Climbing a flight of stairs No Help Needed   Getting to places beyond walking distances No Help Needed

## 2020-12-20 NOTE — PROGRESS NOTES
HPI  Lachelle Coon is a 60y.o. year old female patient of Gail Brooke MD who presents with c/o 1 mos HTN fu.    Pt has history of has Anxiety on their problem list..    Pt here to fu on BP. Stared on amlodipine at last visit. Has been checking home BP, average 140-150/90s on average. Has been having some dry mouth. Urine smells like sulfa. Denies urinary pain or burning, maybe some mild frequency. Takes medication for overactive bladder. Denies blood in urine. Denies cp, pressure, sob or beckford. Denies le edema. CMP WNL at ED visit on  per my last note. Lab Results   Component Value Date/Time    Sodium 137 2018 02:29 PM    Potassium 4.5 2018 12:28 PM    Chloride 97 2018 02:29 PM    CO2 28 2018 02:29 PM    Glucose 90 2018 02:29 PM    BUN 7 2018 02:29 PM    Creatinine 0.68 2018 02:29 PM    BUN/Creatinine ratio 10 2018 02:29 PM    GFR est  2018 02:29 PM    GFR est non-AA 99 2018 02:29 PM    Calcium 10.2 2018 02:29 PM    Bilirubin, total <0.2 2018 02:29 PM    Alk. phosphatase 102 2018 02:29 PM    Protein, total 8.1 2018 02:29 PM    Albumin 4.5 2018 02:29 PM    A-G Ratio 1.3 2018 02:29 PM    ALT (SGPT) 17 2018 02:29 PM    AST (SGOT) 20 2018 02:29 PM           Patient Active Problem List   Diagnosis Code    Anxiety F41.9     No past medical history on file.   Past Surgical History:   Procedure Laterality Date    HX TONSILLECTOMY      MD EXTRAC ERUPTED TOOTH/EXPOSED ROOT Bilateral 3/30/2016    Patient Has Implants     Social History     Socioeconomic History    Marital status:      Spouse name: Not on file    Number of children: Not on file    Years of education: Not on file    Highest education level: Not on file   Tobacco Use    Smoking status: Former Smoker     Quit date: 2014     Years since quittin.0    Smokeless tobacco: Never Used   Substance and Sexual Activity    Alcohol use: Yes     Comment: few drinks per month    Drug use: No    Sexual activity: Yes     Partners: Male     Birth control/protection: None     Family History   Problem Relation Age of Onset    Hypertension Mother     Heart Disease Father 62        MI    Lung Disease Father         smoker, emphysema    No Known Problems Sister     Hypertension Brother     Diabetes Paternal Grandmother      No Known Allergies    MEDICATIONS  Current Outpatient Medications   Medication Sig    cloNIDine HCL (CATAPRES) 0.1 mg tablet TK 1 T PO BID    trospium (SANCTURA) 20 mg tablet TK 1 T PO BID    amLODIPine (NORVASC) 5 mg tablet Take 1 Tab by mouth daily.  FLUoxetine (PROzac) 10 mg tablet TAKE 1 TABLET BY MOUTH  DAILY    fluticasone (FLONASE) 50 mcg/actuation nasal spray 2 Sprays by Both Nostrils route daily.  calcium-cholecalciferol, D3, (CALTRATE 600+D) tablet Take 1 Tab by mouth daily. No current facility-administered medications for this visit. REVIEW OF SYSTEMS  Per HPI        Visit Vitals  BP (!) 151/93 (BP 1 Location: Left arm, BP Patient Position: Sitting) Comment: 150/103 on her machine   Pulse 97   Temp 98.3 °F (36.8 °C) (Oral)   Resp 12   Ht 5' 6\" (1.676 m)   Wt 150 lb (68 kg)   SpO2 98%   BMI 24.21 kg/m²         General: Well-developed, well-nourished. In no distress. A&O x 3. Head: Normocephalic, atraumatic. Eyes: Conjunctiva clear. Lungs: Clear to auscultation bilaterally. No crackles or wheezes. No use of accessory muscles. Speaks in full sentences without SOB. Chest Wall: No tenderness or deformity. Heart: RRR, normal S1 and S2, no murmur, click, rub, or gallop. Back: Symmetric. Skin: No rashes or lesions. Neurological: CN II-XII intact. Neurovasc: No edema appreciated. Dorsalis pedis pulses are 2+ on the right side, and 2+ on the left side. Posterior tibial pulses are 2+ on the right side, and 2+ on the left side.    Musculoskeletal: Gait normal.   Psychiatric: Normal mood and affect. Behavior is normal.       Results for orders placed or performed in visit on 12/21/20   AMB POC URINALYSIS DIP STICK AUTO W/O MICRO   Result Value Ref Range    Color (UA POC) Yellow     Clarity (UA POC) Cloudy     Glucose (UA POC) Negative Negative    Bilirubin (UA POC) Negative Negative    Ketones (UA POC) Negative Negative    Specific gravity (UA POC) 1.015 1.001 - 1.035    Blood (UA POC) 1+ Negative    pH (UA POC) 7.0 4.6 - 8.0    Protein (UA POC) Negative Negative    Urobilinogen (UA POC) 0.2 mg/dL 0.2 - 1    Nitrites (UA POC) Positive Negative    Leukocyte esterase (UA POC) 3+ Negative       ASSESSMENT and PLAN  Diagnoses and all orders for this visit:    1. Essential hypertension  -     losartan (COZAAR) 50 mg tablet; Take 1 Tab by mouth daily. BP above goal  STOP amlodipine   Change to losartan as above  Get BMP at next visit    2. Acute cystitis with hematuria  -     AMB POC URINALYSIS DIP STICK AUTO W/O MICRO  -     CULTURE, URINE; Future  -     nitrofurantoin, macrocrystal-monohydrate, (MACROBID) 100 mg capsule; Take 1 Cap by mouth two (2) times a day for 5 days. Patient Instructions          High Blood Pressure: Care Instructions  Overview     It's normal for blood pressure to go up and down throughout the day. But if it stays up, you have high blood pressure. Another name for high blood pressure is hypertension. Despite what a lot of people think, high blood pressure usually doesn't cause headaches or make you feel dizzy or lightheaded. It usually has no symptoms. But it does increase your risk of stroke, heart attack, and other problems. You and your doctor will talk about your risks of these problems based on your blood pressure. Your doctor will give you a goal for your blood pressure. Your goal will be based on your health and your age.   Lifestyle changes, such as eating healthy and being active, are always important to help lower blood pressure. You might also take medicine to reach your blood pressure goal.  Follow-up care is a key part of your treatment and safety. Be sure to make and go to all appointments, and call your doctor if you are having problems. It's also a good idea to know your test results and keep a list of the medicines you take. How can you care for yourself at home? Medical treatment  · If you stop taking your medicine, your blood pressure will go back up. You may take one or more types of medicine to lower your blood pressure. Be safe with medicines. Take your medicine exactly as prescribed. Call your doctor if you think you are having a problem with your medicine. · Talk to your doctor before you start taking aspirin every day. Aspirin can help certain people lower their risk of a heart attack or stroke. But taking aspirin isn't right for everyone, because it can cause serious bleeding. · See your doctor regularly. You may need to see the doctor more often at first or until your blood pressure comes down. · If you are taking blood pressure medicine, talk to your doctor before you take decongestants or anti-inflammatory medicine, such as ibuprofen. Some of these medicines can raise blood pressure. · Learn how to check your blood pressure at home. Lifestyle changes  · Stay at a healthy weight. This is especially important if you put on weight around the waist. Losing even 10 pounds can help you lower your blood pressure. · If your doctor recommends it, get more exercise. Walking is a good choice. Bit by bit, increase the amount you walk every day. Try for at least 30 minutes on most days of the week. You also may want to swim, bike, or do other activities. · Avoid or limit alcohol. Talk to your doctor about whether you can drink any alcohol. · Try to limit how much sodium you eat to less than 2,300 milligrams (mg) a day. Your doctor may ask you to try to eat less than 1,500 mg a day.   · Eat plenty of fruits (such as bananas and oranges), vegetables, legumes, whole grains, and low-fat dairy products. · Lower the amount of saturated fat in your diet. Saturated fat is found in animal products such as milk, cheese, and meat. Limiting these foods may help you lose weight and also lower your risk for heart disease. · Do not smoke. Smoking increases your risk for heart attack and stroke. If you need help quitting, talk to your doctor about stop-smoking programs and medicines. These can increase your chances of quitting for good. When should you call for help? Call  911 anytime you think you may need emergency care. This may mean having symptoms that suggest that your blood pressure is causing a serious heart or blood vessel problem. Your blood pressure may be over 180/120. For example, call 911 if:    · You have symptoms of a heart attack. These may include:  ? Chest pain or pressure, or a strange feeling in the chest.  ? Sweating. ? Shortness of breath. ? Nausea or vomiting. ? Pain, pressure, or a strange feeling in the back, neck, jaw, or upper belly or in one or both shoulders or arms. ? Lightheadedness or sudden weakness. ? A fast or irregular heartbeat.     · You have symptoms of a stroke. These may include:  ? Sudden numbness, tingling, weakness, or loss of movement in your face, arm, or leg, especially on only one side of your body. ? Sudden vision changes. ? Sudden trouble speaking. ? Sudden confusion or trouble understanding simple statements. ? Sudden problems with walking or balance. ? A sudden, severe headache that is different from past headaches.     · You have severe back or belly pain. Do not wait until your blood pressure comes down on its own. Get help right away.   Call your doctor now or seek immediate care if:    · Your blood pressure is much higher than normal (such as 180/120 or higher), but you don't have symptoms.     · You think high blood pressure is causing symptoms, such as:  ? Severe headache.  ? Blurry vision. Watch closely for changes in your health, and be sure to contact your doctor if:    · Your blood pressure measures higher than your doctor recommends at least 2 times. That means the top number is higher or the bottom number is higher, or both.     · You think you may be having side effects from your blood pressure medicine. Where can you learn more? Go to http://www.gray.com/  Enter [id number] in the search box to learn more about \"High Blood Pressure: Care Instructions. \"  Current as of: December 16, 2019               Content Version: 12.6  © 8281-5472 [employer]. Care instructions adapted under license by [employer] (which disclaims liability or warranty for this information). If you have questions about a medical condition or this instruction, always ask your healthcare professional. [address] any warranty or liability for your use of this information. Urinary Tract Infection in Women: Care Instructions  Your Care Instructions     A urinary tract infection, or UTI, is a general term for an infection anywhere between the kidneys and the urethra (where urine comes out). Most UTIs are bladder infections. They often cause pain or burning when you urinate. UTIs are caused by bacteria and can be cured with antibiotics. Be sure to complete your treatment so that the infection goes away. Follow-up care is a key part of your treatment and safety. Be sure to make and go to all appointments, and call your doctor if you are having problems. It's also a good idea to know your test results and keep a list of the medicines you take. How can you care for yourself at home? · Take your antibiotics as directed. Do not stop taking them just because you feel better. You need to take the full course of antibiotics. · Drink extra water and other fluids for the next day or two.  This may help wash out the bacteria that are causing the infection. (If you have kidney, heart, or liver disease and have to limit fluids, talk with your doctor before you increase your fluid intake.)  · Avoid drinks that are carbonated or have caffeine. They can irritate the bladder. · Urinate often. Try to empty your bladder each time. · To relieve pain, take a hot bath or lay a heating pad set on low over your lower belly or genital area. Never go to sleep with a heating pad in place. To prevent UTIs  · Drink plenty of water each day. This helps you urinate often, which clears bacteria from your system. (If you have kidney, heart, or liver disease and have to limit fluids, talk with your doctor before you increase your fluid intake.)  · Urinate when you need to. · Urinate right after you have sex. · Change sanitary pads often. · Avoid douches, bubble baths, feminine hygiene sprays, and other feminine hygiene products that have deodorants. · After going to the bathroom, wipe from front to back. When should you call for help? Call your doctor now or seek immediate medical care if:    · Symptoms such as fever, chills, nausea, or vomiting get worse or appear for the first time.     · You have new pain in your back just below your rib cage. This is called flank pain.     · There is new blood or pus in your urine.     · You have any problems with your antibiotic medicine. Watch closely for changes in your health, and be sure to contact your doctor if:    · You are not getting better after taking an antibiotic for 2 days.     · Your symptoms go away but then come back. Where can you learn more? Go to http://www.gray.com/  Enter L579 in the search box to learn more about \"Urinary Tract Infection in Women: Care Instructions. \"  Current as of: June 29, 2020               Content Version: 12.6  © 3438-8407 [employer].    Care instructions adapted under license by [employer] (which disclaims liability or warranty for this information). If you have questions about a medical condition or this instruction, always ask your healthcare professional. [place] [address] any warranty or liability for your use of this information. Please keep your follow-up appointment with Chloe Simms MD.         Health Maintenance Due   Topic Date Due    DTaP/Tdap/Td series (1 - Tdap) 11/19/1984    PAP AKA CERVICAL CYTOLOGY  11/19/1984    Colorectal Cancer Screening Combo  11/19/2013       I have discussed the diagnosis with the patient and the intended plan as seen in the above orders. Patient is in agreement. The patient has received an after-visit summary and questions were answered concerning future plans. I have discussed medication side effects and warnings with the patient as well. Warning signs for the above conditions were discussed including when to call our office or go to the emergency room. The nurse provided the patient and/or family with advanced directive information if needed and encouraged the patient to provide a copy to the office when available.

## 2020-12-21 ENCOUNTER — OFFICE VISIT (OUTPATIENT)
Dept: INTERNAL MEDICINE CLINIC | Age: 55
End: 2020-12-21
Payer: COMMERCIAL

## 2020-12-21 VITALS
RESPIRATION RATE: 12 BRPM | SYSTOLIC BLOOD PRESSURE: 140 MMHG | DIASTOLIC BLOOD PRESSURE: 82 MMHG | BODY MASS INDEX: 24.11 KG/M2 | HEIGHT: 66 IN | HEART RATE: 97 BPM | TEMPERATURE: 98.3 F | OXYGEN SATURATION: 98 % | WEIGHT: 150 LBS

## 2020-12-21 DIAGNOSIS — N30.01 ACUTE CYSTITIS WITH HEMATURIA: ICD-10-CM

## 2020-12-21 DIAGNOSIS — I10 ESSENTIAL HYPERTENSION: Primary | ICD-10-CM

## 2020-12-21 LAB
BILIRUB UR QL STRIP: NEGATIVE
GLUCOSE UR-MCNC: NEGATIVE MG/DL
KETONES P FAST UR STRIP-MCNC: NEGATIVE MG/DL
PH UR STRIP: 7 [PH] (ref 4.6–8)
PROT UR QL STRIP: NEGATIVE
SP GR UR STRIP: 1.01 (ref 1–1.03)
UA UROBILINOGEN AMB POC: NORMAL (ref 0.2–1)
URINALYSIS CLARITY POC: NORMAL
URINALYSIS COLOR POC: YELLOW
URINE BLOOD POC: NORMAL
URINE LEUKOCYTES POC: NORMAL
URINE NITRITES POC: POSITIVE

## 2020-12-21 PROCEDURE — 99213 OFFICE O/P EST LOW 20 MIN: CPT | Performed by: NURSE PRACTITIONER

## 2020-12-21 PROCEDURE — 81003 URINALYSIS AUTO W/O SCOPE: CPT | Performed by: NURSE PRACTITIONER

## 2020-12-21 RX ORDER — NITROFURANTOIN 25; 75 MG/1; MG/1
100 CAPSULE ORAL 2 TIMES DAILY
Qty: 10 CAP | Refills: 0 | Status: SHIPPED | OUTPATIENT
Start: 2020-12-21 | End: 2020-12-26

## 2020-12-21 RX ORDER — LOSARTAN POTASSIUM 50 MG/1
50 TABLET ORAL DAILY
Qty: 30 TAB | Refills: 1 | Status: SHIPPED | OUTPATIENT
Start: 2020-12-21 | End: 2021-02-16 | Stop reason: SDUPTHER

## 2020-12-21 NOTE — PROGRESS NOTES
Candace Smith  Identified pt with two pt identifiers(name and ). Chief Complaint   Patient presents with    Hypertension       Reviewed record In preparation for visit and have obtained necessary documentation. 1. Have you been to the ER, urgent care clinic or hospitalized since your last visit? No     2. Have you seen or consulted any other health care providers outside of the [address] [place] [address] since your last visit? Include any pap smears or colon screening. No    Vitals reviewed with provider. Health Maintenance reviewed:     Health Maintenance Due   Topic    DTaP/Tdap/Td series (1 - Tdap)    PAP AKA CERVICAL CYTOLOGY     Colorectal Cancer Screening Combo         Wt Readings from Last 3 Encounters:   20 152 lb (68.9 kg)   20 148 lb 9.6 oz (67.4 kg)   08/15/19 150 lb 6.4 oz (68.2 kg)      Temp Readings from Last 3 Encounters:   20 98.2 °F (36.8 °C) (Oral)   20 98 °F (36.7 °C) (Oral)   08/15/19 98.1 °F (36.7 °C) (Oral)      BP Readings from Last 3 Encounters:   20 (!) 150/95   20 142/90   08/15/19 (!) 148/91      Pulse Readings from Last 3 Encounters:   20 96   20 84   08/15/19 71      There were no vitals filed for this visit. Learning Assessment:   :     No flowsheet data found.      Depression Screening:   :     3 most recent PHQ Screens 2020   Little interest or pleasure in doing things Not at all   Feeling down, depressed, irritable, or hopeless Not at all   Total Score PHQ 2 0   Trouble falling or staying asleep, or sleeping too much -   Feeling tired or having little energy -   Poor appetite, weight loss, or overeating -   Feeling bad about yourself - or that you are a failure or have let yourself or your family down -   Trouble concentrating on things such as school, work, reading, or watching TV -   Moving or speaking so slowly that other people could have noticed; or the opposite being so fidgety that others notice - Thoughts of being better off dead, or hurting yourself in some way -   PHQ 9 Score -        Fall Risk Assessment:   :     Fall Risk Assessment, last 12 mths 11/26/2018   Able to walk? Yes   Fall in past 12 months? No        Abuse Screening:   :     Abuse Screening Questionnaire 11/26/2018   Do you ever feel afraid of your partner? N   Are you in a relationship with someone who physically or mentally threatens you? N   Is it safe for you to go home?  Y        ADL Screening:   :     ADL Assessment 11/26/2018   Feeding yourself No Help Needed   Getting from bed to chair No Help Needed   Getting dressed No Help Needed   Bathing or showering No Help Needed   Walk across the room (includes cane/walker) No Help Needed   Using the telphone No Help Needed   Taking your medications No Help Needed   Preparing meals No Help Needed   Managing money (expenses/bills) No Help Needed   Moderately strenuous housework (laundry) No Help Needed   Shopping for personal items (toiletries/medicines) No Help Needed   Shopping for groceries No Help Needed   Driving No Help Needed   Climbing a flight of stairs No Help Needed   Getting to places beyond walking distances No Help Needed

## 2020-12-21 NOTE — PATIENT INSTRUCTIONS
High Blood Pressure: Care Instructions  Overview     It's normal for blood pressure to go up and down throughout the day. But if it stays up, you have high blood pressure. Another name for high blood pressure is hypertension. Despite what a lot of people think, high blood pressure usually doesn't cause headaches or make you feel dizzy or lightheaded. It usually has no symptoms. But it does increase your risk of stroke, heart attack, and other problems. You and your doctor will talk about your risks of these problems based on your blood pressure. Your doctor will give you a goal for your blood pressure. Your goal will be based on your health and your age. Lifestyle changes, such as eating healthy and being active, are always important to help lower blood pressure. You might also take medicine to reach your blood pressure goal.  Follow-up care is a key part of your treatment and safety. Be sure to make and go to all appointments, and call your doctor if you are having problems. It's also a good idea to know your test results and keep a list of the medicines you take. How can you care for yourself at home? Medical treatment  · If you stop taking your medicine, your blood pressure will go back up. You may take one or more types of medicine to lower your blood pressure. Be safe with medicines. Take your medicine exactly as prescribed. Call your doctor if you think you are having a problem with your medicine. · Talk to your doctor before you start taking aspirin every day. Aspirin can help certain people lower their risk of a heart attack or stroke. But taking aspirin isn't right for everyone, because it can cause serious bleeding. · See your doctor regularly. You may need to see the doctor more often at first or until your blood pressure comes down. · If you are taking blood pressure medicine, talk to your doctor before you take decongestants or anti-inflammatory medicine, such as ibuprofen.  Some of these medicines can raise blood pressure. · Learn how to check your blood pressure at home. Lifestyle changes  · Stay at a healthy weight. This is especially important if you put on weight around the waist. Losing even 10 pounds can help you lower your blood pressure. · If your doctor recommends it, get more exercise. Walking is a good choice. Bit by bit, increase the amount you walk every day. Try for at least 30 minutes on most days of the week. You also may want to swim, bike, or do other activities. · Avoid or limit alcohol. Talk to your doctor about whether you can drink any alcohol. · Try to limit how much sodium you eat to less than 2,300 milligrams (mg) a day. Your doctor may ask you to try to eat less than 1,500 mg a day. · Eat plenty of fruits (such as bananas and oranges), vegetables, legumes, whole grains, and low-fat dairy products. · Lower the amount of saturated fat in your diet. Saturated fat is found in animal products such as milk, cheese, and meat. Limiting these foods may help you lose weight and also lower your risk for heart disease. · Do not smoke. Smoking increases your risk for heart attack and stroke. If you need help quitting, talk to your doctor about stop-smoking programs and medicines. These can increase your chances of quitting for good. When should you call for help? Call  911 anytime you think you may need emergency care. This may mean having symptoms that suggest that your blood pressure is causing a serious heart or blood vessel problem. Your blood pressure may be over 180/120. For example, call 911 if:    · You have symptoms of a heart attack. These may include:  ? Chest pain or pressure, or a strange feeling in the chest.  ? Sweating. ? Shortness of breath. ? Nausea or vomiting. ? Pain, pressure, or a strange feeling in the back, neck, jaw, or upper belly or in one or both shoulders or arms. ? Lightheadedness or sudden weakness.   ? A fast or irregular heartbeat.     · You have symptoms of a stroke. These may include:  ? Sudden numbness, tingling, weakness, or loss of movement in your face, arm, or leg, especially on only one side of your body. ? Sudden vision changes. ? Sudden trouble speaking. ? Sudden confusion or trouble understanding simple statements. ? Sudden problems with walking or balance. ? A sudden, severe headache that is different from past headaches.     · You have severe back or belly pain. Do not wait until your blood pressure comes down on its own. Get help right away. Call your doctor now or seek immediate care if:    · Your blood pressure is much higher than normal (such as 180/120 or higher), but you don't have symptoms.     · You think high blood pressure is causing symptoms, such as:  ? Severe headache.  ? Blurry vision. Watch closely for changes in your health, and be sure to contact your doctor if:    · Your blood pressure measures higher than your doctor recommends at least 2 times. That means the top number is higher or the bottom number is higher, or both.     · You think you may be having side effects from your blood pressure medicine. Where can you learn more? Go to http://www.gray.com/  Enter [id number] in the search box to learn more about \"High Blood Pressure: Care Instructions. \"  Current as of: December 16, 2019               Content Version: 12.6  © 6637-4899 [employer]. Care instructions adapted under license by "[employer] (which disclaims liability or warranty for this information). If you have questions about a medical condition or this instruction, always ask your healthcare professional. [place] [address] any warranty or liability for your use of this information.            Urinary Tract Infection in Women: Care Instructions  Your Care Instructions     A urinary tract infection, or UTI, is a general term for an infection anywhere between the kidneys and the urethra (where urine comes out). Most UTIs are bladder infections. They often cause pain or burning when you urinate. UTIs are caused by bacteria and can be cured with antibiotics. Be sure to complete your treatment so that the infection goes away. Follow-up care is a key part of your treatment and safety. Be sure to make and go to all appointments, and call your doctor if you are having problems. It's also a good idea to know your test results and keep a list of the medicines you take. How can you care for yourself at home? · Take your antibiotics as directed. Do not stop taking them just because you feel better. You need to take the full course of antibiotics. · Drink extra water and other fluids for the next day or two. This may help wash out the bacteria that are causing the infection. (If you have kidney, heart, or liver disease and have to limit fluids, talk with your doctor before you increase your fluid intake.)  · Avoid drinks that are carbonated or have caffeine. They can irritate the bladder. · Urinate often. Try to empty your bladder each time. · To relieve pain, take a hot bath or lay a heating pad set on low over your lower belly or genital area. Never go to sleep with a heating pad in place. To prevent UTIs  · Drink plenty of water each day. This helps you urinate often, which clears bacteria from your system. (If you have kidney, heart, or liver disease and have to limit fluids, talk with your doctor before you increase your fluid intake.)  · Urinate when you need to. · Urinate right after you have sex. · Change sanitary pads often. · Avoid douches, bubble baths, feminine hygiene sprays, and other feminine hygiene products that have deodorants. · After going to the bathroom, wipe from front to back. When should you call for help?    Call your doctor now or seek immediate medical care if:    · Symptoms such as fever, chills, nausea, or vomiting get worse or appear for the first time.     · You have new pain in your back just below your rib cage. This is called flank pain.     · There is new blood or pus in your urine.     · You have any problems with your antibiotic medicine. Watch closely for changes in your health, and be sure to contact your doctor if:    · You are not getting better after taking an antibiotic for 2 days.     · Your symptoms go away but then come back. Where can you learn more? Go to http://www.gray.com/  Enter M600 in the search box to learn more about \"Urinary Tract Infection in Women: Care Instructions. \"  Current as of: June 29, 2020               Content Version: 12.6  © 2789-7863 [employer]. Care instructions adapted under license by [employer] (which disclaims liability or warranty for this information). If you have questions about a medical condition or this instruction, always ask your healthcare professional. [address] any warranty or liability for your use of this information.

## 2020-12-24 LAB
BACTERIA UR CULT: ABNORMAL
SPECIMEN STATUS REPORT, ROLRST: NORMAL

## 2021-02-16 DIAGNOSIS — I10 ESSENTIAL HYPERTENSION: Primary | ICD-10-CM

## 2021-02-16 RX ORDER — LOSARTAN POTASSIUM 50 MG/1
50 TABLET ORAL DAILY
Qty: 30 TAB | Refills: 1 | Status: SHIPPED | OUTPATIENT
Start: 2021-02-16 | End: 2021-03-04 | Stop reason: DRUGHIGH

## 2021-02-16 NOTE — TELEPHONE ENCOUNTER
PCP: Cas Brennan MD    Last appt: 12/21/2020  Future Appointments   Date Time Provider Aristides Vazquez   3/4/2021  1:30 PM Richard John NP [facility]       Requested Prescriptions     Pending Prescriptions Disp Refills    losartan (COZAAR) 50 mg tablet 30 Tab 1     Sig: Take 1 Tab by mouth daily.

## 2021-03-03 NOTE — PROGRESS NOTES
HPI  Cordell Atwood is a 60y.o. year old female patient of Paulino Mayer MD who presents with c/o 1 mos fu HTN. Pt has history of has Anxiety on their problem list..    Pt here to fu on BP. Hasn't been able to check bp at home since machine broke. Has been feeling well. Denies any known adr to medication, reports compliance. Denies chest pain, pressure, sob or [place]. Also c/o constipation the last week or so. Last BM two days ago. Drinks lemonade, tea, coffee mostly- lots of coffee. Watches her grandchildren- 2 yo. No routine exercise. Does add salt to foods. Will have new insurance on April 1- plans to do mammogram then, was scheduled through [facility]. [facility] approx 7 [facility], plans to reschedule with new insurance. Lab Results   Component Value Date/Time    Sodium 137 11/26/2018 02:29 PM    Potassium 4.5 11/28/2018 12:28 PM    Chloride 97 11/26/2018 02:29 PM    CO2 28 11/26/2018 02:29 PM    Glucose 90 11/26/2018 02:29 PM    BUN 7 11/26/2018 02:29 PM    Creatinine 0.68 11/26/2018 02:29 PM    BUN/Creatinine ratio 10 11/26/2018 02:29 PM    GFR est  11/26/2018 02:29 PM    GFR est non-AA 99 11/26/2018 02:29 PM    Calcium 10.2 11/26/2018 02:29 PM    Bilirubin, total <0.2 11/26/2018 02:29 PM    Alk. phosphatase 102 11/26/2018 02:29 PM    Protein, total 8.1 11/26/2018 02:29 PM    Albumin 4.5 11/26/2018 02:29 PM    A-G Ratio 1.3 11/26/2018 02:29 PM    ALT (SGPT) 17 11/26/2018 02:29 PM    AST (SGOT) 20 11/26/2018 02:29 PM         Patient Active Problem List   Diagnosis Code    Anxiety F41.9     No past medical history on file.   Past Surgical History:   Procedure Laterality Date    HX TONSILLECTOMY      MA EXTRAC ERUPTED TOOTH/EXPOSED ROOT Bilateral 3/30/2016    Patient Has Implants     Social History     Socioeconomic History    Marital status:      Spouse name: Not on file    Number of children: Not on file    Years of education: Not on file    Highest education level: Not on file   Tobacco Use    Smoking status: Former Smoker     Quit date: 2014     Years since quittin.2    Smokeless tobacco: Never Used   Substance and Sexual Activity    Alcohol use: Yes     Comment: few drinks per month    Drug use: No    Sexual activity: Yes     Partners: Male     Birth control/protection: None     Family History   Problem Relation Age of Onset    Hypertension Mother     Heart Disease Father 62        MI    Lung Disease Father         smoker, emphysema    No Known Problems Sister     Hypertension Brother     Diabetes Paternal Grandmother      No Known Allergies    MEDICATIONS  Current Outpatient Medications   Medication Sig    aspirin delayed-release 81 mg tablet Take 81 mg by mouth.  losartan (COZAAR) 50 mg tablet Take 1 Tab by mouth daily.  trospium (SANCTURA) 20 mg tablet TK 1 T PO BID    FLUoxetine (PROzac) 10 mg tablet TAKE 1 TABLET BY MOUTH  DAILY    fluticasone (FLONASE) 50 mcg/actuation nasal spray 2 Sprays by Both Nostrils route daily.  calcium-cholecalciferol, D3, (CALTRATE 600+D) tablet Take 1 Tab by mouth daily. No current facility-administered medications for this visit. REVIEW OF SYSTEMS  Per HPI        Visit Vitals  BP (!) 140/90   Pulse (!) 103   Temp 98.5 °F (36.9 °C) (Oral)   Resp 16   Ht 5' 6\" (1.676 m)   Wt 153 lb 9.6 oz (69.7 kg)   SpO2 97%   BMI 24.79 kg/m²         General: Well-developed, well-nourished. In no distress. A&O x 3. Head: Normocephalic, atraumatic. Eyes: Conjunctiva clear. Pupils equal, round, reactive to light. Extraocular movements intact. Ears/Nose: . Nares normal. Septum midline. Normal nasal mucosa. No drainage or sinus tenderness. Mouth/Throat: Lips, mucosa, and tongue normal. Oropharynx benign.     Neck: Supple, symmetrical, trachea midline, no lymphadenopathy, no carotid bruits, no JVD, thyroid: not enlarged, symmetric, no tenderness/mass/nodules. Lungs: Clear to auscultation bilaterally. No crackles or wheezes. No use of accessory muscles. Speaks in full sentences without SOB. Chest Wall: No tenderness or deformity. Heart: RRR, normal S1 and S2, no murmur, click, rub, or gallop. Skin: No rashes or lesions. Neurovasc: No edema appreciated. Dorsalis pedis pulses are 2+ on the right side, and 2+ on the left side. Posterior tibial pulses are 2+ on the right side, and 2+ on the left side. Musculoskeletal: Gait normal.   Psychiatric: Normal mood and affect. Behavior is normal.       No results found for any visits on 03/04/21. ASSESSMENT and PLAN  Diagnoses and all orders for this visit:    1. Essential hypertension  -     losartan (COZAAR) 100 mg tablet; Take 1 Tab by mouth daily. bp not at goal, increase as above  Labs at next visit once she has her new insurance per pt request- CMP, lipids    2. Constipation, unspecified constipation type  Recommend below + decrease dairy intake    She defers colonoscopy and mammogram until her new insurance starts next month. Patient Instructions     For constipation:  Take metamucil 2.5 to 30 g per day in divided doses (start with smallest dose and work-up until effective). Use Colace (or store brand equivalent) 100 mg, 1 or 2 once or twice a day for constipation. Add Miralax if Colace is not effective. Drink plenty of water, get adequate physical activity (30 minutes a day), and maintain a high fiber diet (plenty of fruits and vegetables). If still having problems despite this therapy, let me know. Goal is one BM every day or every other day. High Blood Pressure: Care Instructions  Overview     It's normal for blood pressure to go up and down throughout the day. But if it stays up, you have high blood pressure. Another name for high blood pressure is hypertension.   Despite what a lot of people think, high blood pressure usually doesn't cause headaches or make you feel dizzy or lightheaded. It usually has no symptoms. But it does increase your risk of stroke, heart attack, and other problems. You and your doctor will talk about your risks of these problems based on your blood pressure. Your doctor will give you a goal for your blood pressure. Your goal will be based on your health and your age. Lifestyle changes, such as eating healthy and being active, are always important to help lower blood pressure. You might also take medicine to reach your blood pressure goal.  Follow-up care is a key part of your treatment and safety. Be sure to make and go to all appointments, and call your doctor if you are having problems. It's also a good idea to know your test results and keep a list of the medicines you take. How can you care for yourself at home? Medical treatment  · If you stop taking your medicine, your blood pressure will go back up. You may take one or more types of medicine to lower your blood pressure. Be safe with medicines. Take your medicine exactly as prescribed. Call your doctor if you think you are having a problem with your medicine. · Talk to your doctor before you start taking aspirin every day. Aspirin can help certain people lower their risk of a heart attack or stroke. But taking aspirin isn't right for everyone, because it can cause serious bleeding. · See your doctor regularly. You may need to see the doctor more often at first or until your blood pressure comes down. · If you are taking blood pressure medicine, talk to your doctor before you take decongestants or anti-inflammatory medicine, such as ibuprofen. Some of these medicines can raise blood pressure. · Learn how to check your blood pressure at home. Lifestyle changes  · Stay at a healthy weight. This is especially important if you put on weight around the waist. Losing even 10 pounds can help you lower your blood pressure.   · If your doctor recommends it, get more exercise. Walking is a good choice. Bit by bit, increase the amount you walk every day. Try for at least 30 minutes on most days of the week. You also may want to swim, bike, or do other activities. · Avoid or limit alcohol. Talk to your doctor about whether you can drink any alcohol. · Try to limit how much sodium you eat to less than 2,300 milligrams (mg) a day. Your doctor may ask you to try to eat less than 1,500 mg a day. · Eat plenty of fruits (such as bananas and oranges), vegetables, legumes, whole grains, and low-fat dairy products. · Lower the amount of saturated fat in your diet. Saturated fat is found in animal products such as milk, cheese, and meat. Limiting these foods may help you lose weight and also lower your risk for heart disease. · Do not smoke. Smoking increases your risk for heart attack and stroke. If you need help quitting, talk to your doctor about stop-smoking programs and medicines. These can increase your chances of quitting for good. When should you call for help? Call  911 anytime you think you may need emergency care. This may mean having symptoms that suggest that your blood pressure is causing a serious heart or blood vessel problem. Your blood pressure may be over 180/120. For example, call 911 if:    · You have symptoms of a heart attack. These may include:  ? Chest pain or pressure, or a strange feeling in the chest.  ? Sweating. ? Shortness of breath. ? Nausea or vomiting. ? Pain, pressure, or a strange feeling in the back, neck, jaw, or upper belly or in one or both shoulders or arms. ? Lightheadedness or sudden weakness. ? A fast or irregular heartbeat.     · You have symptoms of a stroke. These may include:  ? Sudden numbness, tingling, weakness, or loss of movement in your face, arm, or leg, especially on only one side of your body. ? Sudden vision changes. ? Sudden trouble speaking. ? Sudden confusion or trouble understanding simple statements.   ? Sudden problems with walking or balance. ? A sudden, severe headache that is different from past headaches.     · You have severe back or belly pain. Do not wait until your blood pressure comes down on its own. Get help right away. Call your doctor now or seek immediate care if:    · Your blood pressure is much higher than normal (such as 180/120 or higher), but you don't have symptoms.     · You think high blood pressure is causing symptoms, such as:  ? Severe headache.  ? Blurry vision. Watch closely for changes in your health, and be sure to contact your doctor if:    · Your blood pressure measures higher than your doctor recommends at least 2 times. That means the top number is higher or the bottom number is higher, or both.     · You think you may be having side effects from your blood pressure medicine. Where can you learn more? Go to http://www.gray.com/  Enter [id number] in the search box to learn more about \"High Blood Pressure: Care Instructions. \"  Current as of: December 16, 2019               Content Version: 12.6  © 9477-5422 [employer]. Care instructions adapted under license by [employer] (which disclaims liability or warranty for this information). If you have questions about a medical condition or this instruction, always ask your healthcare professional. [place] [address] any warranty or liability for your use of this information. Please keep your follow-up appointment with Susan Patel MD.         Health Maintenance Due   Topic Date Due    DTaP/Tdap/Td series (1 - Tdap) Never done    PAP AKA CERVICAL CYTOLOGY  Never done    Colorectal Cancer Screening Combo  Never done    Breast Cancer Screen Mammogram  02/18/2021       I have discussed the diagnosis with the patient and the intended plan as seen in the above orders. Patient is in agreement.   The patient has received an after-visit summary and questions were answered concerning future plans. I have discussed medication side effects and warnings with the patient as well. Warning signs for the above conditions were discussed including when to call our office or go to the emergency room. The nurse provided the patient and/or family with advanced directive information if needed and encouraged the patient to provide a copy to the office when available.

## 2021-03-04 ENCOUNTER — OFFICE VISIT (OUTPATIENT)
Dept: INTERNAL MEDICINE CLINIC | Age: 56
End: 2021-03-04
Payer: COMMERCIAL

## 2021-03-04 VITALS
HEART RATE: 103 BPM | RESPIRATION RATE: 16 BRPM | HEIGHT: 66 IN | TEMPERATURE: 98.5 F | SYSTOLIC BLOOD PRESSURE: 140 MMHG | BODY MASS INDEX: 24.68 KG/M2 | OXYGEN SATURATION: 97 % | WEIGHT: 153.6 LBS | DIASTOLIC BLOOD PRESSURE: 90 MMHG

## 2021-03-04 DIAGNOSIS — I10 ESSENTIAL HYPERTENSION: Primary | ICD-10-CM

## 2021-03-04 DIAGNOSIS — K59.00 CONSTIPATION, UNSPECIFIED CONSTIPATION TYPE: ICD-10-CM

## 2021-03-04 PROCEDURE — 99213 OFFICE O/P EST LOW 20 MIN: CPT | Performed by: NURSE PRACTITIONER

## 2021-03-04 RX ORDER — ASPIRIN 81 MG/1
81 TABLET ORAL
COMMUNITY

## 2021-03-04 RX ORDER — LOSARTAN POTASSIUM 100 MG/1
100 TABLET ORAL DAILY
Qty: 30 TAB | Refills: 1 | Status: SHIPPED | OUTPATIENT
Start: 2021-03-04 | End: 2021-04-07 | Stop reason: SDUPTHER

## 2021-03-04 NOTE — PATIENT INSTRUCTIONS
For constipation: 
Take metamucil 2.5 to 30 g per day in divided doses (start with smallest dose and work-up until effective). Use Colace (or store brand equivalent) 100 mg, 1 or 2 once or twice a day for constipation. Add Miralax if Colace is not effective. Drink plenty of water, get adequate physical activity (30 minutes a day), and maintain a high fiber diet (plenty of fruits and vegetables). If still having problems despite this therapy, let me know. Goal is one BM every day or every other day. High Blood Pressure: Care Instructions Overview It's normal for blood pressure to go up and down throughout the day. But if it stays up, you have high blood pressure. Another name for high blood pressure is hypertension. Despite what a lot of people think, high blood pressure usually doesn't cause headaches or make you feel dizzy or lightheaded. It usually has no symptoms. But it does increase your risk of stroke, heart attack, and other problems. You and your doctor will talk about your risks of these problems based on your blood pressure. Your doctor will give you a goal for your blood pressure. Your goal will be based on your health and your age. Lifestyle changes, such as eating healthy and being active, are always important to help lower blood pressure. You might also take medicine to reach your blood pressure goal. 
Follow-up care is a key part of your treatment and safety. Be sure to make and go to all appointments, and call your doctor if you are having problems. It's also a good idea to know your test results and keep a list of the medicines you take. How can you care for yourself at home? Medical treatment · If you stop taking your medicine, your blood pressure will go back up. You may take one or more types of medicine to lower your blood pressure. Be safe with medicines. Take your medicine exactly as prescribed. Call your doctor if you think you are having a problem with your medicine. · Talk to your doctor before you start taking aspirin every day. Aspirin can help certain people lower their risk of a heart attack or stroke. But taking aspirin isn't right for everyone, because it can cause serious bleeding. · See your doctor regularly. You may need to see the doctor more often at first or until your blood pressure comes down. · If you are taking blood pressure medicine, talk to your doctor before you take decongestants or anti-inflammatory medicine, such as ibuprofen. Some of these medicines can raise blood pressure. · Learn how to check your blood pressure at home. Lifestyle changes · Stay at a healthy weight. This is especially important if you put on weight around the waist. Losing even 10 pounds can help you lower your blood pressure. · If your doctor recommends it, get more exercise. Walking is a good choice. Bit by bit, increase the amount you walk every day. Try for at least 30 minutes on most days of the week. You also may want to swim, bike, or do other activities. · Avoid or limit alcohol. Talk to your doctor about whether you can drink any alcohol. · Try to limit how much sodium you eat to less than 2,300 milligrams (mg) a day. Your doctor may ask you to try to eat less than 1,500 mg a day. · Eat plenty of fruits (such as bananas and oranges), vegetables, legumes, whole grains, and low-fat dairy products. · Lower the amount of saturated fat in your diet. Saturated fat is found in animal products such as milk, cheese, and meat. Limiting these foods may help you lose weight and also lower your risk for heart disease. · Do not smoke. Smoking increases your risk for heart attack and stroke. If you need help quitting, talk to your doctor about stop-smoking programs and medicines. These can increase your chances of quitting for good. When should you call for help? Call  911 anytime you think you may need emergency care. This may mean having symptoms that suggest that your blood pressure is causing a serious heart or blood vessel problem. Your blood pressure may be over 180/120. For example, call 911 if: 
  · You have symptoms of a heart attack. These may include: 
? Chest pain or pressure, or a strange feeling in the chest. 
? Sweating. ? Shortness of breath. ? Nausea or vomiting. ? Pain, pressure, or a strange feeling in the back, neck, jaw, or upper belly or in one or both shoulders or arms. ? Lightheadedness or sudden weakness. ? A fast or irregular heartbeat.  
  · You have symptoms of a stroke. These may include: 
? Sudden numbness, tingling, weakness, or loss of movement in your face, arm, or leg, especially on only one side of your body. ? Sudden vision changes. ? Sudden trouble speaking. ? Sudden confusion or trouble understanding simple statements. ? Sudden problems with walking or balance. ? A sudden, severe headache that is different from past headaches.  
  · You have severe back or belly pain. Do not wait until your blood pressure comes down on its own. Get help right away. Call your doctor now or seek immediate care if: 
  · Your blood pressure is much higher than normal (such as 180/120 or higher), but you don't have symptoms.  
  · You think high blood pressure is causing symptoms, such as: 
? Severe headache. 
? Blurry vision. Watch closely for changes in your health, and be sure to contact your doctor if: 
  · Your blood pressure measures higher than your doctor recommends at least 2 times. That means the top number is higher or the bottom number is higher, or both.   · You think you may be having side effects from your blood pressure medicine. Where can you learn more? Go to http://www.gray.com/ Enter M990 in the search box to learn more about \"High Blood Pressure: Care Instructions. \" Current as of: December 16, 2019               Content Version: 12.6 © 8518-4657 [employer]. Care instructions adapted under license by [employer] (which disclaims liability or warranty for this information). If you have questions about a medical condition or this instruction, always ask your healthcare professional. [address] any warranty or liability for your use of this information.

## 2021-03-04 NOTE — PROGRESS NOTES
Room: 1B    Identified pt with two pt identifiers(name and ). Reviewed record in preparation for visit and have obtained necessary documentation. All patient medications has been reviewed. Chief Complaint   Patient presents with    Hypertension    Anxiety     would like to discuss coming off of fluoxetine       Health Maintenance Due   Topic    DTaP/Tdap/Td series (1 - Tdap)    PAP AKA CERVICAL CYTOLOGY     Colorectal Cancer Screening Combo     Breast Cancer Screen Mammogram        Vitals:    21 1327 21 1336   BP: (!) 149/100 (!) 142/92   Pulse: (!) 103    Resp: 16    Temp: 98.5 °F (36.9 °C)    TempSrc: Oral    SpO2: 97%    Weight: 153 lb 9.6 oz (69.7 kg)    Height: 5' 6\" (1.676 m)    PainSc:   0 - No pain        4. Have you been to the ER, urgent care clinic since your last visit? Hospitalized since your last visit? No    5. Have you seen or consulted any other health care providers outside of the [address] [place] [address] since your last visit? Include any pap smears or colon screening. No    Patient is accompanied by self I have received verbal consent from Arsenio Aguilar to discuss any/all medical information while they are present in the room.

## 2021-03-30 DIAGNOSIS — F41.9 ANXIETY: ICD-10-CM

## 2021-03-31 RX ORDER — FLUOXETINE 10 MG/1
TABLET ORAL
Qty: 90 TAB | Refills: 3 | Status: SHIPPED | OUTPATIENT
Start: 2021-03-31 | End: 2022-04-20

## 2021-04-07 DIAGNOSIS — I10 ESSENTIAL HYPERTENSION: Primary | ICD-10-CM

## 2021-04-07 RX ORDER — LOSARTAN POTASSIUM 100 MG/1
100 TABLET ORAL DAILY
Qty: 30 TAB | Refills: 1 | Status: SHIPPED | OUTPATIENT
Start: 2021-04-07 | End: 2021-04-15 | Stop reason: SDUPTHER

## 2021-04-07 NOTE — TELEPHONE ENCOUNTER
PCP: Roge Truong MD    Last appt: 3/4/2021  Future Appointments   Date Time Provider Aristides Vazquez   4/15/2021  2:00 PM NILA Marques AMB

## 2021-04-07 NOTE — TELEPHONE ENCOUNTER
Requested Prescriptions     Pending Prescriptions Disp Refills    losartan (COZAAR) 100 mg tablet 30 Tab 1     Sig: Take 1 Tab by mouth daily.

## 2021-04-14 NOTE — PROGRESS NOTES
HPI  Alfred Stone is a 60y.o. year old female patient of Galen Lucio MD who presents with c/o BP fu.    Pt has history of has Anxiety on their problem list..    Pt here for BP fu. Losartan increased at last visit. Doesn't check BP at home, machine broke. Denies chest pain, pressure, sob or [place]. Denies ha or blurred vision. Denies any adverse symptoms. No lightheadedness or dizziness. She was also seen in the [facility] ED for UTI on 3-21-21 treated with keflex. Feels much better but still has sensation that she has to go at times. Occurs intermittently. No blood in urine, no pelvic pain, no odor. Follows with [address] [place] [address] for mammogram, will call to schedule. Has to have her labs at [facility].     Patient Active Problem List   Diagnosis Code    Anxiety F41.9     No past medical history on file. Past Surgical History:   Procedure Laterality Date    HX TONSILLECTOMY      W[place] EXTRAC ERUPTED TOOTH/EXPOSED ROOT Bilateral 3/30/2016    Patient Has Implants     Social History     Socioeconomic History    Marital status:      Spouse name: Not on file    Number of children: Not on file    Years of education: Not on file    Highest education level: Not on file   Tobacco Use    Smoking status: Former Smoker     Quit date: 2014     Years since quittin.3    Smokeless tobacco: Never Used   Substance and Sexual Activity    Alcohol use: Yes     Comment: few drinks per month    Drug use: No    Sexual activity: Yes     Partners: Male     Birth control/protection: None     Family History   Problem Relation Age of Onset    Hypertension Mother     Heart Disease Father 62        MI    Lung Disease Father         smoker, emphysema    No Known Problems Sister     Hypertension Brother     Diabetes Paternal Grandmother      No Known Allergies    MEDICATIONS  Current Outpatient Medications   Medication Sig    losartan (COZAAR) 100 mg tablet Take 1 Tab by mouth daily.     FLUoxetine (PROzac) 10 mg tablet TAKE 1 TABLET BY MOUTH  DAILY    aspirin delayed-release 81 mg tablet Take 81 mg by mouth.  trospium (SANCTURA) 20 mg tablet TK 1 T PO BID    fluticasone (FLONASE) 50 mcg/actuation nasal spray 2 Sprays by Both Nostrils route daily.  calcium-cholecalciferol, D3, (CALTRATE 600+D) tablet Take 1 Tab by mouth daily. No current facility-administered medications for this visit. REVIEW OF SYSTEMS  Per HPI        Visit Vitals  /85   Pulse 96   Temp 98.3 °F (36.8 °C) (Oral)   Resp 16   Ht 5' 6\" (1.676 m)   Wt 153 lb (69.4 kg)   SpO2 98%   BMI 24.69 kg/m²         General: Well-developed, well-nourished. In no distress. A&O x 3. Head: Normocephalic, atraumatic. Eyes: Conjunctiva clear. Lungs: Clear to auscultation bilaterally. No crackles or wheezes. No use of accessory muscles. Speaks in full sentences without SOB. Chest Wall: No tenderness or deformity. Heart: RRR, normal S1 and S2, no murmur, click, rub, or gallop. Skin: No rashes or lesions. Neurovasc: No edema appreciated. Dorsalis pedis pulses are 2+ on the right side, and 2+ on the left side. Posterior tibial pulses are 2+ on the right side, and 2+ on the left side. Musculoskeletal: Gait normal  Psychiatric: Normal mood and affect. Behavior is normal.       Results for orders placed or performed in visit on 04/15/21   AMB POC URINALYSIS DIP STICK AUTO W/ MICRO   Result Value Ref Range    Color (UA POC) Yellow     Clarity (UA POC) Clear     Glucose (UA POC) Negative Negative    Bilirubin (UA POC) Negative Negative    Ketones (UA POC) Negative Negative    Specific gravity (UA POC) 1.010 1.001 - 1.035    Blood (UA POC) Trace Negative    pH (UA POC) 6.0 4.6 - 8.0    Protein (UA POC) Negative Negative    Urobilinogen (UA POC) 0.2 mg/dL 0.2 - 1    Nitrites (UA POC) Negative Negative    Leukocyte esterase (UA POC) Negative Negative       ASSESSMENT and PLAN  Diagnoses and all orders for this visit:    1. Essential hypertension  -     METABOLIC PANEL, COMPREHENSIVE; Future  -     LIPID PANEL; Future  -     losartan (COZAAR) 100 mg tablet; Take 1 Tab by mouth daily.  -     CBC WITH AUTOMATED DIFF; Future  Well controlled, continue current management. 2. Dysuria  -     AMB POC URINALYSIS DIP STICK AUTO W/ MICRO  UTI resolved    3. Screen for colon cancer  -     REFERRAL TO GASTROENTEROLOGY            Patient Instructions          High Blood Pressure: Care Instructions  Overview     It's normal for blood pressure to go up and down throughout the day. But if it stays up, you have high blood pressure. Another name for high blood pressure is hypertension. Despite what a lot of people think, high blood pressure usually doesn't cause headaches or make you feel dizzy or lightheaded. It usually has no symptoms. But it does increase your risk of stroke, heart attack, and other problems. You and your doctor will talk about your risks of these problems based on your blood pressure. Your doctor will give you a goal for your blood pressure. Your goal will be based on your health and your age. Lifestyle changes, such as eating healthy and being active, are always important to help lower blood pressure. You might also take medicine to reach your blood pressure goal.  Follow-up care is a key part of your treatment and safety. Be sure to make and go to all appointments, and call your doctor if you are having problems. It's also a good idea to know your test results and keep a list of the medicines you take. How can you care for yourself at home? Medical treatment  · If you stop taking your medicine, your blood pressure will go back up. You may take one or more types of medicine to lower your blood pressure. Be safe with medicines. Take your medicine exactly as prescribed. Call your doctor if you think you are having a problem with your medicine. · Talk to your doctor before you start taking aspirin every day.  Aspirin can help certain people lower their risk of a heart attack or stroke. But taking aspirin isn't right for everyone, because it can cause serious bleeding. · See your doctor regularly. You may need to see the doctor more often at first or until your blood pressure comes down. · If you are taking blood pressure medicine, talk to your doctor before you take decongestants or anti-inflammatory medicine, such as ibuprofen. Some of these medicines can raise blood pressure. · Learn how to check your blood pressure at home. Lifestyle changes  · Stay at a healthy weight. This is especially important if you put on weight around the waist. Losing even 10 pounds can help you lower your blood pressure. · If your doctor recommends it, get more exercise. Walking is a good choice. Bit by bit, increase the amount you walk every day. Try for at least 30 minutes on most days of the week. You also may want to swim, bike, or do other activities. · Avoid or limit alcohol. Talk to your doctor about whether you can drink any alcohol. · Try to limit how much sodium you eat to less than 2,300 milligrams (mg) a day. Your doctor may ask you to try to eat less than 1,500 mg a day. · Eat plenty of fruits (such as bananas and oranges), vegetables, legumes, whole grains, and low-fat dairy products. · Lower the amount of saturated fat in your diet. Saturated fat is found in animal products such as milk, cheese, and meat. Limiting these foods may help you lose weight and also lower your risk for heart disease. · Do not smoke. Smoking increases your risk for heart attack and stroke. If you need help quitting, talk to your doctor about stop-smoking programs and medicines. These can increase your chances of quitting for good. When should you call for help? Call  911 anytime you think you may need emergency care. This may mean having symptoms that suggest that your blood pressure is causing a serious heart or blood vessel problem.  Your blood pressure may be over 180/120. For example, call 911 if:    · You have symptoms of a heart attack. These may include:  ? Chest pain or pressure, or a strange feeling in the chest.  ? Sweating. ? Shortness of breath. ? Nausea or vomiting. ? Pain, pressure, or a strange feeling in the back, neck, jaw, or upper belly or in one or both shoulders or arms. ? Lightheadedness or sudden weakness. ? A fast or irregular heartbeat.     · You have symptoms of a stroke. These may include:  ? Sudden numbness, tingling, weakness, or loss of movement in your face, arm, or leg, especially on only one side of your body. ? Sudden vision changes. ? Sudden trouble speaking. ? Sudden confusion or trouble understanding simple statements. ? Sudden problems with walking or balance. ? A sudden, severe headache that is different from past headaches.     · You have severe back or belly pain. Do not wait until your blood pressure comes down on its own. Get help right away. Call your doctor now or seek immediate care if:    · Your blood pressure is much higher than normal (such as 180/120 or higher), but you don't have symptoms.     · You think high blood pressure is causing symptoms, such as:  ? Severe headache.  ? Blurry vision. Watch closely for changes in your health, and be sure to contact your doctor if:    · Your blood pressure measures higher than your doctor recommends at least 2 times. That means the top number is higher or the bottom number is higher, or both.     · You think you may be having side effects from your blood pressure medicine. Where can you learn more? Go to http://www.gray.com/  Enter [id number] in the search box to learn more about \"High Blood Pressure: Care Instructions. \"  Current as of: August 31, 2020               Content Version: 12.8  © 0802-4987 [employer].    Care instructions adapted under license by [employer] (which disclaims liability or warranty for this information). If you have questions about a medical condition or this instruction, always ask your healthcare professional. [address] any warranty or liability for your use of this information. Please keep your follow-up appointment with Socorro Harper MD.         Health Maintenance Due   Topic Date Due    DTaP/Tdap/Td series (1 - Tdap) Never done    PAP AKA CERVICAL CYTOLOGY  Never done    Shingrix Vaccine Age 50> (1 of 2) Never done    Colorectal Cancer Screening Combo  Never done    Breast Cancer Screen Mammogram  02/18/2021       I have discussed the diagnosis with the patient and the intended plan as seen in the above orders. Patient is in agreement. The patient has received an after-visit summary and questions were answered concerning future plans. I have discussed medication side effects and warnings with the patient as well. Warning signs for the above conditions were discussed including when to call our office or go to the emergency room. The nurse provided the patient and/or family with advanced directive information if needed and encouraged the patient to provide a copy to the office when available.

## 2021-04-15 ENCOUNTER — OFFICE VISIT (OUTPATIENT)
Dept: INTERNAL MEDICINE CLINIC | Age: 56
End: 2021-04-15
Payer: COMMERCIAL

## 2021-04-15 VITALS
DIASTOLIC BLOOD PRESSURE: 85 MMHG | BODY MASS INDEX: 24.59 KG/M2 | SYSTOLIC BLOOD PRESSURE: 130 MMHG | WEIGHT: 153 LBS | HEIGHT: 66 IN | RESPIRATION RATE: 16 BRPM | TEMPERATURE: 98.3 F | OXYGEN SATURATION: 98 % | HEART RATE: 96 BPM

## 2021-04-15 DIAGNOSIS — I10 ESSENTIAL HYPERTENSION: Primary | ICD-10-CM

## 2021-04-15 DIAGNOSIS — Z12.11 SCREEN FOR COLON CANCER: ICD-10-CM

## 2021-04-15 DIAGNOSIS — R30.0 DYSURIA: ICD-10-CM

## 2021-04-15 LAB
BILIRUB UR QL STRIP: NEGATIVE
GLUCOSE UR-MCNC: NEGATIVE MG/DL
KETONES P FAST UR STRIP-MCNC: NEGATIVE MG/DL
PH UR STRIP: 6 [PH] (ref 4.6–8)
PROT UR QL STRIP: NEGATIVE
SP GR UR STRIP: 1.01 (ref 1–1.03)
UA UROBILINOGEN AMB POC: NORMAL (ref 0.2–1)
URINALYSIS CLARITY POC: CLEAR
URINALYSIS COLOR POC: YELLOW
URINE BLOOD POC: NORMAL
URINE LEUKOCYTES POC: NEGATIVE
URINE NITRITES POC: NEGATIVE

## 2021-04-15 PROCEDURE — 99213 OFFICE O/P EST LOW 20 MIN: CPT | Performed by: NURSE PRACTITIONER

## 2021-04-15 PROCEDURE — 81001 URINALYSIS AUTO W/SCOPE: CPT | Performed by: NURSE PRACTITIONER

## 2021-04-15 RX ORDER — LOSARTAN POTASSIUM 100 MG/1
100 TABLET ORAL DAILY
Qty: 90 TAB | Refills: 1 | Status: SHIPPED | OUTPATIENT
Start: 2021-04-15 | End: 2021-10-18

## 2021-04-15 RX ORDER — CETIRIZINE HYDROCHLORIDE 10 MG/1
CAPSULE, LIQUID FILLED ORAL
COMMUNITY
End: 2022-09-13

## 2021-04-15 NOTE — PROGRESS NOTES
Identified pt with two pt identifiers. Reviewed record in preparation for visit and have obtained necessary documentation. All patient medications has been reviewed. Chief Complaint   Patient presents with    Blood Pressure Check    Urinary Frequency     Additional information about chief complaint:    Visit Vitals  BP (!) 150/92 (BP 1 Location: Left upper arm, BP Patient Position: Sitting, BP Cuff Size: Large adult)   Pulse 96   Temp 98.3 °F (36.8 °C) (Oral)   Resp 16   Ht 5' 6\" (1.676 m)   Wt 153 lb (69.4 kg)   SpO2 98%   BMI 24.69 kg/m²       Health Maintenance Due   Topic    DTaP/Tdap/Td series (1 - Tdap)    PAP AKA CERVICAL CYTOLOGY     Shingrix Vaccine Age 50> (1 of 2)    Colorectal Cancer Screening Combo     Breast Cancer Screen Mammogram        1. Have you been to the ER, urgent care clinic since your last visit? Hospitalized since your last visit? Yes [facility] 3/21/21 for UTI  2. Have you seen or consulted any other health care providers outside of the [address] [place] [address] since your last visit? Include any pap smears or colon screening.  NO     bdg

## 2021-04-15 NOTE — PATIENT INSTRUCTIONS
High Blood Pressure: Care Instructions  Overview     It's normal for blood pressure to go up and down throughout the day. But if it stays up, you have high blood pressure. Another name for high blood pressure is hypertension. Despite what a lot of people think, high blood pressure usually doesn't cause headaches or make you feel dizzy or lightheaded. It usually has no symptoms. But it does increase your risk of stroke, heart attack, and other problems. You and your doctor will talk about your risks of these problems based on your blood pressure. Your doctor will give you a goal for your blood pressure. Your goal will be based on your health and your age. Lifestyle changes, such as eating healthy and being active, are always important to help lower blood pressure. You might also take medicine to reach your blood pressure goal.  Follow-up care is a key part of your treatment and safety. Be sure to make and go to all appointments, and call your doctor if you are having problems. It's also a good idea to know your test results and keep a list of the medicines you take. How can you care for yourself at home? Medical treatment  · If you stop taking your medicine, your blood pressure will go back up. You may take one or more types of medicine to lower your blood pressure. Be safe with medicines. Take your medicine exactly as prescribed. Call your doctor if you think you are having a problem with your medicine. · Talk to your doctor before you start taking aspirin every day. Aspirin can help certain people lower their risk of a heart attack or stroke. But taking aspirin isn't right for everyone, because it can cause serious bleeding. · See your doctor regularly. You may need to see the doctor more often at first or until your blood pressure comes down. · If you are taking blood pressure medicine, talk to your doctor before you take decongestants or anti-inflammatory medicine, such as ibuprofen.  Some of these medicines can raise blood pressure. · Learn how to check your blood pressure at home. Lifestyle changes  · Stay at a healthy weight. This is especially important if you put on weight around the waist. Losing even 10 pounds can help you lower your blood pressure. · If your doctor recommends it, get more exercise. Walking is a good choice. Bit by bit, increase the amount you walk every day. Try for at least 30 minutes on most days of the week. You also may want to swim, bike, or do other activities. · Avoid or limit alcohol. Talk to your doctor about whether you can drink any alcohol. · Try to limit how much sodium you eat to less than 2,300 milligrams (mg) a day. Your doctor may ask you to try to eat less than 1,500 mg a day. · Eat plenty of fruits (such as bananas and oranges), vegetables, legumes, whole grains, and low-fat dairy products. · Lower the amount of saturated fat in your diet. Saturated fat is found in animal products such as milk, cheese, and meat. Limiting these foods may help you lose weight and also lower your risk for heart disease. · Do not smoke. Smoking increases your risk for heart attack and stroke. If you need help quitting, talk to your doctor about stop-smoking programs and medicines. These can increase your chances of quitting for good. When should you call for help? Call  911 anytime you think you may need emergency care. This may mean having symptoms that suggest that your blood pressure is causing a serious heart or blood vessel problem. Your blood pressure may be over 180/120. For example, call 911 if:    · You have symptoms of a heart attack. These may include:  ? Chest pain or pressure, or a strange feeling in the chest.  ? Sweating. ? Shortness of breath. ? Nausea or vomiting. ? Pain, pressure, or a strange feeling in the back, neck, jaw, or upper belly or in one or both shoulders or arms. ? Lightheadedness or sudden weakness.   ? A fast or irregular heartbeat.     · You have symptoms of a stroke. These may include:  ? Sudden numbness, tingling, weakness, or loss of movement in your face, arm, or leg, especially on only one side of your body. ? Sudden vision changes. ? Sudden trouble speaking. ? Sudden confusion or trouble understanding simple statements. ? Sudden problems with walking or balance. ? A sudden, severe headache that is different from past headaches.     · You have severe back or belly pain. Do not wait until your blood pressure comes down on its own. Get help right away. Call your doctor now or seek immediate care if:    · Your blood pressure is much higher than normal (such as 180/120 or higher), but you don't have symptoms.     · You think high blood pressure is causing symptoms, such as:  ? Severe headache.  ? Blurry vision. Watch closely for changes in your health, and be sure to contact your doctor if:    · Your blood pressure measures higher than your doctor recommends at least 2 times. That means the top number is higher or the bottom number is higher, or both.     · You think you may be having side effects from your blood pressure medicine. Where can you learn more? Go to http://www.gray.com/  Enter [id number] in the search box to learn more about \"High Blood Pressure: Care Instructions. \"  Current as of: August 31, 2020               Content Version: 12.8  © 2006-2021 [employer]. Care instructions adapted under license by [employer] (which disclaims liability or warranty for this information). If you have questions about a medical condition or this instruction, always ask your healthcare professional. [place] [address] any warranty or liability for your use of this information.

## 2021-04-18 LAB
ALB/GLOBRATIO, 58C: 1.4 (CALC) (ref 1–2.5)
ALBUMIN SERPL-MCNC: 4.3 G/DL (ref 3.6–5.1)
ALKALINE PHOSPHATASE, TOTAL, 25002000: 100 U/L (ref 37–153)
ALT SERPL-CCNC: 11 U/L (ref 6–29)
AST SERPL W P-5'-P-CCNC: 18 U/L (ref 10–35)
BASOPHILS # BLD: 42 CELLS/UL (ref 0–200)
BASOPHILS NFR BLD: 0.9 %
BILIRUB SERPL-MCNC: 0.4 MG/DL (ref 0.2–1.2)
BUN SERPL-MCNC: 8 MG/DL (ref 7–25)
BUN/CREATININE RATIO,BUCR: NORMAL (CALC) (ref 6–22)
CALCIUM SERPL-MCNC: 9.4 MG/DL (ref 8.6–10.4)
CHLORIDE SERPL-SCNC: 99 MMOL/L (ref 98–110)
CHOL/HDL RATIO,CHHDX: 4.1 (CALC)
CHOLEST SERPL-MCNC: 229 MG/DL
CO2 SERPL-SCNC: 27 MMOL/L (ref 20–32)
CREAT SERPL-MCNC: 0.69 MG/DL (ref 0.5–1.05)
EOSINOPHIL # BLD: 160 CELLS/UL (ref 15–500)
EOSINOPHIL NFR BLD: 3.4 %
ERYTHROCYTE [DISTWIDTH] IN BLOOD BY AUTOMATED COUNT: 12.7 % (ref 11–15)
GLOBULIN,GLOB: 3.1 G/DL (CALC) (ref 1.9–3.7)
GLUCOSE SERPL-MCNC: 93 MG/DL (ref 65–99)
HCT VFR BLD AUTO: 38.2 % (ref 35–45)
HDLC SERPL-MCNC: 56 MG/DL
HGB BLD-MCNC: 13.1 G/DL (ref 11.7–15.5)
LDL-CHOLESTEROL: 155 MG/DL (CALC)
LYMPHOCYTES # BLD: 1509 CELLS/UL (ref 850–3900)
LYMPHOCYTES NFR BLD: 32.1 %
MCH RBC QN AUTO: 29.2 PG (ref 27–33)
MCHC RBC AUTO-ENTMCNC: 34.3 G/DL (ref 32–36)
MCV RBC AUTO: 85.1 FL (ref 80–100)
MONOCYTES # BLD: 522 CELLS/UL (ref 200–950)
MONOCYTES NFR BLD: 11.1 %
NEUTROPHILS # BLD AUTO: 2468 CELLS/UL (ref 1500–7800)
NEUTROPHILS # BLD: 52.5 %
NON-HDL CHOLESTEROL, 011976: 173 MG/DL (CALC)
PLATELET # BLD AUTO: 345 THOUSAND/UL (ref 140–400)
PMV BLD AUTO: 9.8 FL (ref 7.5–12.5)
POTASSIUM SERPL-SCNC: 4.6 MMOL/L (ref 3.5–5.3)
PROT SERPL-MCNC: 7.4 G/DL (ref 6.1–8.1)
RBC # BLD AUTO: 4.49 MILLION/UL (ref 3.8–5.1)
SODIUM SERPL-SCNC: 135 MMOL/L (ref 135–146)
TRIGL SERPL-MCNC: 81 MG/DL (ref ?–150)
WBC # BLD AUTO: 4.7 THOUSAND/UL (ref 3.8–10.8)

## 2021-04-19 NOTE — PROGRESS NOTES
Pls let patient know her blood sugar, blood counts, kidney, and liver labs are all normal. Keep up the good work! Please let patient know her cholesterol is elevated. Elevated cholesterol increases risk for heart disease, heart attack and stroke. Ways to decrease cholesterol include decreasing saturated fats in the diet found in many packaged foods and fried foods, increasing exercise (whcih helps raise our good protective cholesterol) and weight loss if indicated. Eating a diet high in vegetables and fruits is also helpful. It is recommended that anyone with a calculated risk of >10% (or less with additional risk factors such as smoking, known heart disease, diabetes, etc.) start cholesterol lowering medication. Her current risk is 9% so not quite there but getting closer so work on above.

## 2021-10-18 DIAGNOSIS — I10 ESSENTIAL HYPERTENSION: ICD-10-CM

## 2021-10-18 RX ORDER — LOSARTAN POTASSIUM 100 MG/1
TABLET ORAL
Qty: 90 TABLET | Refills: 1 | Status: SHIPPED | OUTPATIENT
Start: 2021-10-18 | End: 2022-04-01 | Stop reason: SDUPTHER

## 2022-01-11 ENCOUNTER — TELEPHONE (OUTPATIENT)
Dept: INTERNAL MEDICINE CLINIC | Age: 57
End: 2022-01-11

## 2022-02-28 NOTE — PROGRESS NOTES
HPI  Jam Payan is a 60y.o. year old female patient of Bakari Patel MD who presents with c/o   Chief Complaint   Patient presents with    Hypertension     Room 1A //        Pt has history of has Anxiety and Essential hypertension on their problem list.. HTN  Home BP: 140s/90s at home, highest 150s/95  Medication regimen/ADR/missed doses: losartan 100mg daily, no side effects  Diet: Eats fried and fast foods regularly  Exercise:no regular exercise, watches grandchildren 13 mos and 3 yo  Weight: up 11lbs since last visit    Denies chest pain, chest pressure, or palpitations. Denies SOB, orthopnea, or PND. Denies HA, dizziness, blurred vision, or swelling. Had colonoscopy and mammogram since last visit.       Lab Results   Component Value Date/Time    Sodium 135 04/17/2021 10:18 AM    Potassium 4.6 04/17/2021 10:18 AM    Chloride 99 04/17/2021 10:18 AM    CO2 27 04/17/2021 10:18 AM    Glucose 93 04/17/2021 10:18 AM    BUN 8 04/17/2021 10:18 AM    Creatinine 0.69 04/17/2021 10:18 AM    BUN/Creatinine ratio NOT APPLICABLE 67/12/7185 96:15 AM    GFR est  04/17/2021 10:18 AM    GFR est non-AA 97 04/17/2021 10:18 AM    Calcium 9.4 04/17/2021 10:18 AM     Lab Results   Component Value Date/Time    Cholesterol, total 229 (H) 04/17/2021 10:18 AM    HDL Cholesterol 56 04/17/2021 10:18 AM    LDL-CHOLESTEROL 155 (H) 04/17/2021 10:18 AM    LDL, calculated 134 (H) 01/24/2018 04:41 PM    VLDL, calculated 27 01/24/2018 04:41 PM    Triglyceride 81 04/17/2021 10:18 AM    Cholesterol/HDL ratio 4.1 04/17/2021 10:18 AM           Health Maintenance Overdue  Health Maintenance Due   Topic Date Due    DTaP/Tdap/Td series (1 - Tdap) Never done    Cervical cancer screen  Never done    Colorectal Cancer Screening Combo  Never done    Shingrix Vaccine Age 50> (1 of 2) Never done    COVID-19 Vaccine (3 - Booster for Zepeda Peter series) 02/22/2022       Depression Screen  3 most recent PHQ Screens 3/1/2022   Little interest or pleasure in doing things Not at all   Feeling down, depressed, irritable, or hopeless Not at all   Total Score PHQ 2 0   Trouble falling or staying asleep, or sleeping too much -   Feeling tired or having little energy -   Poor appetite, weight loss, or overeating -   Feeling bad about yourself - or that you are a failure or have let yourself or your family down -   Trouble concentrating on things such as school, work, reading, or watching TV -   Moving or speaking so slowly that other people could have noticed; or the opposite being so fidgety that others notice -   Thoughts of being better off dead, or hurting yourself in some way -   PHQ 9 Score -           Patient Active Problem List   Diagnosis Code    Anxiety F41.9     No past medical history on file. Past Surgical History:   Procedure Laterality Date    HX TONSILLECTOMY      OR EXTRAC ERUPTED TOOTH/EXPOSED ROOT Bilateral 3/30/2016    Patient Has Implants     Social History     Socioeconomic History    Marital status:    Tobacco Use    Smoking status: Former Smoker     Quit date: 2014     Years since quittin.2    Smokeless tobacco: Never Used   Vaping Use    Vaping Use: Never used   Substance and Sexual Activity    Alcohol use: Yes     Comment: few drinks per month    Drug use: No    Sexual activity: Yes     Partners: Male     Birth control/protection: None     Family History   Problem Relation Age of Onset    Hypertension Mother     Heart Disease Father 62        MI    Lung Disease Father         smoker, emphysema    No Known Problems Sister     Hypertension Brother     Diabetes Paternal Grandmother      No Known Allergies    MEDICATIONS  Current Outpatient Medications   Medication Sig    losartan (COZAAR) 100 mg tablet TAKE 1 TABLET BY MOUTH EVERY DAY    Cetirizine (ZyrTEC) 10 mg cap Take  by mouth.     FLUoxetine (PROzac) 10 mg tablet TAKE 1 TABLET BY MOUTH  DAILY    aspirin delayed-release 81 mg tablet Take 81 mg by mouth.  trospium (SANCTURA) 20 mg tablet TK 1 T PO BID    fluticasone (FLONASE) 50 mcg/actuation nasal spray 2 Sprays by Both Nostrils route daily.  calcium-cholecalciferol, D3, (CALTRATE 600+D) tablet Take 1 Tab by mouth daily. No current facility-administered medications for this visit. REVIEW OF SYSTEMS  Per HPI        Visit Vitals  BP (!) 138/92 (BP 1 Location: Left upper arm, BP Patient Position: Sitting, BP Cuff Size: Adult)   Pulse 88   Temp 98 °F (36.7 °C) (Oral)   Resp 16   Ht 5' 6\" (1.676 m)   Wt 164 lb 3.2 oz (74.5 kg)   SpO2 97%   BMI 26.50 kg/m²         General: Well-developed, well-nourished. In no distress. A&O x 3. Head: Normocephalic, atraumatic. Eyes: Conjunctiva clear. Mouth/Throat: Lips, mucosa, and tongue normal. Oropharynx benign. Neck: Supple, symmetrical, trachea midline, no lymphadenopathy, no carotid bruits, no JVD, thyroid: not enlarged, symmetric, no tenderness/mass/nodules. Lungs: Clear to auscultation bilaterally. No crackles or wheezes. No use of accessory muscles. Speaks in full sentences without SOB. Chest Wall: No tenderness or deformity. Heart: RRR, normal S1 and S2, no murmur, click, rub, or gallop. Skin: No rashes or lesions. Neurovasc: No edema appreciated. Dorsalis pedis pulses are 2+ on the right side, and 2+ on the left side. Posterior tibial pulses are 2+ on the right side, and 2+ on the left side. Musculoskeletal: Gait normal.  Psychiatric: Normal mood and affect. Behavior is normal.       No results found for any visits on 03/01/22. ASSESSMENT and PLAN  Diagnoses and all orders for this visit:    1. Essential hypertension  -     METABOLIC PANEL, COMPREHENSIVE; Future  -     hydroCHLOROthiazide (HYDRODIURIL) 12.5 mg tablet; Take 1 Tablet by mouth daily.- ADD NEW MED  BP not at goal, discussed diet and exercise, had HCTZ, get labs fu in 1 month    2. Mixed hyperlipidemia  -     LIPID PANEL;  Future            Patient Instructions          High Blood Pressure: Care Instructions  Overview     It's normal for blood pressure to go up and down throughout the day. But if it stays up, you have high blood pressure. Another name for high blood pressure is hypertension. Despite what a lot of people think, high blood pressure usually doesn't cause headaches or make you feel dizzy or lightheaded. It usually has no symptoms. But it does increase your risk of stroke, heart attack, and other problems. You and your doctor will talk about your risks of these problems based on your blood pressure. Your doctor will give you a goal for your blood pressure. Your goal will be based on your health and your age. Lifestyle changes, such as eating healthy and being active, are always important to help lower blood pressure. You might also take medicine to reach your blood pressure goal.  Follow-up care is a key part of your treatment and safety. Be sure to make and go to all appointments, and call your doctor if you are having problems. It's also a good idea to know your test results and keep a list of the medicines you take. How can you care for yourself at home? Medical treatment  · If you stop taking your medicine, your blood pressure will go back up. You may take one or more types of medicine to lower your blood pressure. Be safe with medicines. Take your medicine exactly as prescribed. Call your doctor if you think you are having a problem with your medicine. · Talk to your doctor before you start taking aspirin every day. Aspirin can help certain people lower their risk of a heart attack or stroke. But taking aspirin isn't right for everyone, because it can cause serious bleeding. · See your doctor regularly. You may need to see the doctor more often at first or until your blood pressure comes down. · If you are taking blood pressure medicine, talk to your doctor before you take decongestants or anti-inflammatory medicine, such as ibuprofen. Some of these medicines can raise blood pressure. · Learn how to check your blood pressure at home. Lifestyle changes  · Stay at a healthy weight. This is especially important if you put on weight around the waist. Losing even 10 pounds can help you lower your blood pressure. · If your doctor recommends it, get more exercise. Walking is a good choice. Bit by bit, increase the amount you walk every day. Try for at least 30 minutes on most days of the week. You also may want to swim, bike, or do other activities. · Avoid or limit alcohol. Talk to your doctor about whether you can drink any alcohol. · Try to limit how much sodium you eat to less than 2,300 milligrams (mg) a day. Your doctor may ask you to try to eat less than 1,500 mg a day. · Eat plenty of fruits (such as bananas and oranges), vegetables, legumes, whole grains, and low-fat dairy products. · Lower the amount of saturated fat in your diet. Saturated fat is found in animal products such as milk, cheese, and meat. Limiting these foods may help you lose weight and also lower your risk for heart disease. · Do not smoke. Smoking increases your risk for heart attack and stroke. If you need help quitting, talk to your doctor about stop-smoking programs and medicines. These can increase your chances of quitting for good. When should you call for help? Call 911  anytime you think you may need emergency care. This may mean having symptoms that suggest that your blood pressure is causing a serious heart or blood vessel problem. Your blood pressure may be over 180/120. For example, call 911 if:    · You have symptoms of a heart attack. These may include:  ? Chest pain or pressure, or a strange feeling in the chest.  ? Sweating. ? Shortness of breath. ? Nausea or vomiting. ? Pain, pressure, or a strange feeling in the back, neck, jaw, or upper belly or in one or both shoulders or arms. ? Lightheadedness or sudden weakness.   ? A fast or irregular heartbeat.     · You have symptoms of a stroke. These may include:  ? Sudden numbness, tingling, weakness, or loss of movement in your face, arm, or leg, especially on only one side of your body. ? Sudden vision changes. ? Sudden trouble speaking. ? Sudden confusion or trouble understanding simple statements. ? Sudden problems with walking or balance. ? A sudden, severe headache that is different from past headaches.     · You have severe back or belly pain. Do not wait until your blood pressure comes down on its own. Get help right away. Call your doctor now or seek immediate care if:    · Your blood pressure is much higher than normal (such as 180/120 or higher), but you don't have symptoms.     · You think high blood pressure is causing symptoms, such as:  ? Severe headache.  ? Blurry vision. Watch closely for changes in your health, and be sure to contact your doctor if:    · Your blood pressure measures higher than your doctor recommends at least 2 times. That means the top number is higher or the bottom number is higher, or both.     · You think you may be having side effects from your blood pressure medicine. Where can you learn more? Go to http://www.gray.com/  Enter [id number] in the search box to learn more about \"High Blood Pressure: Care Instructions. \"  Current as of: April 29, 2021               Content Version: 13.0  © 2002-1143 [employer]. Care instructions adapted under license by [employer] (which disclaims liability or warranty for this information). If you have questions about a medical condition or this instruction, always ask your healthcare professional. [place] [address] any warranty or liability for your use of this information.             Please keep your follow-up appointment with Yina Pearson MD.         Health Maintenance Due   Topic Date Due    DTaP/Tdap/Td series (1 - Tdap) Never done    Cervical cancer screen  Never done    Colorectal Cancer Screening Combo  Never done    Shingrix Vaccine Age 50> (1 of 2) Never done    COVID-19 Vaccine (3 - Booster for Pf[employer] series) 02/22/2022       I have discussed the diagnosis with the patient and the intended plan as seen in the above orders. Patient is in agreement. The patient has received an after-visit summary and questions were answered concerning future plans. I have discussed medication side effects and warnings with the patient as well. Warning signs for the above conditions were discussed including when to call our office or go to the emergency room. The nurse provided the patient and/or family with advanced directive information if needed and encouraged the patient to provide a copy to the office when available.

## 2022-03-01 ENCOUNTER — OFFICE VISIT (OUTPATIENT)
Dept: INTERNAL MEDICINE CLINIC | Age: 57
End: 2022-03-01
Payer: COMMERCIAL

## 2022-03-01 VITALS
HEIGHT: 66 IN | OXYGEN SATURATION: 97 % | HEART RATE: 88 BPM | WEIGHT: 164.2 LBS | DIASTOLIC BLOOD PRESSURE: 92 MMHG | TEMPERATURE: 98 F | BODY MASS INDEX: 26.39 KG/M2 | RESPIRATION RATE: 16 BRPM | SYSTOLIC BLOOD PRESSURE: 138 MMHG

## 2022-03-01 DIAGNOSIS — E78.2 MIXED HYPERLIPIDEMIA: ICD-10-CM

## 2022-03-01 DIAGNOSIS — I10 ESSENTIAL HYPERTENSION: Primary | ICD-10-CM

## 2022-03-01 PROCEDURE — 99213 OFFICE O/P EST LOW 20 MIN: CPT | Performed by: NURSE PRACTITIONER

## 2022-03-01 RX ORDER — HYDROCHLOROTHIAZIDE 12.5 MG/1
12.5 TABLET ORAL DAILY
Qty: 90 TABLET | Refills: 1 | Status: SHIPPED | OUTPATIENT
Start: 2022-03-01 | End: 2022-05-11

## 2022-03-01 NOTE — PATIENT INSTRUCTIONS
High Blood Pressure: Care Instructions  Overview     It's normal for blood pressure to go up and down throughout the day. But if it stays up, you have high blood pressure. Another name for high blood pressure is hypertension. Despite what a lot of people think, high blood pressure usually doesn't cause headaches or make you feel dizzy or lightheaded. It usually has no symptoms. But it does increase your risk of stroke, heart attack, and other problems. You and your doctor will talk about your risks of these problems based on your blood pressure. Your doctor will give you a goal for your blood pressure. Your goal will be based on your health and your age. Lifestyle changes, such as eating healthy and being active, are always important to help lower blood pressure. You might also take medicine to reach your blood pressure goal.  Follow-up care is a key part of your treatment and safety. Be sure to make and go to all appointments, and call your doctor if you are having problems. It's also a good idea to know your test results and keep a list of the medicines you take. How can you care for yourself at home? Medical treatment  · If you stop taking your medicine, your blood pressure will go back up. You may take one or more types of medicine to lower your blood pressure. Be safe with medicines. Take your medicine exactly as prescribed. Call your doctor if you think you are having a problem with your medicine. · Talk to your doctor before you start taking aspirin every day. Aspirin can help certain people lower their risk of a heart attack or stroke. But taking aspirin isn't right for everyone, because it can cause serious bleeding. · See your doctor regularly. You may need to see the doctor more often at first or until your blood pressure comes down. · If you are taking blood pressure medicine, talk to your doctor before you take decongestants or anti-inflammatory medicine, such as ibuprofen.  Some of these medicines can raise blood pressure. · Learn how to check your blood pressure at home. Lifestyle changes  · Stay at a healthy weight. This is especially important if you put on weight around the waist. Losing even 10 pounds can help you lower your blood pressure. · If your doctor recommends it, get more exercise. Walking is a good choice. Bit by bit, increase the amount you walk every day. Try for at least 30 minutes on most days of the week. You also may want to swim, bike, or do other activities. · Avoid or limit alcohol. Talk to your doctor about whether you can drink any alcohol. · Try to limit how much sodium you eat to less than 2,300 milligrams (mg) a day. Your doctor may ask you to try to eat less than 1,500 mg a day. · Eat plenty of fruits (such as bananas and oranges), vegetables, legumes, whole grains, and low-fat dairy products. · Lower the amount of saturated fat in your diet. Saturated fat is found in animal products such as milk, cheese, and meat. Limiting these foods may help you lose weight and also lower your risk for heart disease. · Do not smoke. Smoking increases your risk for heart attack and stroke. If you need help quitting, talk to your doctor about stop-smoking programs and medicines. These can increase your chances of quitting for good. When should you call for help? Call 911  anytime you think you may need emergency care. This may mean having symptoms that suggest that your blood pressure is causing a serious heart or blood vessel problem. Your blood pressure may be over 180/120. For example, call 911 if:    · You have symptoms of a heart attack. These may include:  ? Chest pain or pressure, or a strange feeling in the chest.  ? Sweating. ? Shortness of breath. ? Nausea or vomiting. ? Pain, pressure, or a strange feeling in the back, neck, jaw, or upper belly or in one or both shoulders or arms. ? Lightheadedness or sudden weakness.   ? A fast or irregular heartbeat.     · You have symptoms of a stroke. These may include:  ? Sudden numbness, tingling, weakness, or loss of movement in your face, arm, or leg, especially on only one side of your body. ? Sudden vision changes. ? Sudden trouble speaking. ? Sudden confusion or trouble understanding simple statements. ? Sudden problems with walking or balance. ? A sudden, severe headache that is different from past headaches.     · You have severe back or belly pain. Do not wait until your blood pressure comes down on its own. Get help right away. Call your doctor now or seek immediate care if:    · Your blood pressure is much higher than normal (such as 180/120 or higher), but you don't have symptoms.     · You think high blood pressure is causing symptoms, such as:  ? Severe headache.  ? Blurry vision. Watch closely for changes in your health, and be sure to contact your doctor if:    · Your blood pressure measures higher than your doctor recommends at least 2 times. That means the top number is higher or the bottom number is higher, or both.     · You think you may be having side effects from your blood pressure medicine. Where can you learn more? Go to http://www.gray.com/  Enter [id number] in the search box to learn more about \"High Blood Pressure: Care Instructions. \"  Current as of: April 29, 2021               Content Version: 13.0  © 2006-2021 [employer]. Care instructions adapted under license by [employer] (which disclaims liability or warranty for this information). If you have questions about a medical condition or this instruction, always ask your healthcare professional. [place] [address] any warranty or liability for your use of this information.

## 2022-03-01 NOTE — PROGRESS NOTES
Rosabel Duverney  Identified pt with two pt identifiers(name and ). Chief Complaint   Patient presents with    Hypertension     Room 1A //        Reviewed record In preparation for visit and have obtained necessary documentation. 1. Have you been to the ER, urgent care clinic or hospitalized since your last visit? No     2. Have you seen or consulted any other health care providers outside of the [address] [place] [address] since your last visit? Include any pap smears or colon screening. Yes. [id number]. Colonoscopy - Edi Barrientos Late    Patient has an advance directive. Vitals reviewed with provider. Health Maintenance reviewed:     Health Maintenance Due   Topic    DTaP/Tdap/Td series (1 - Tdap)    Cervical cancer screen     Colorectal Cancer Screening Combo     Shingrix Vaccine Age 50> (1 of 2)    COVID-19 Vaccine (3 - Booster for Zepeda Gabriel series)        Wt Readings from Last 3 Encounters:   22 164 lb 3.2 oz (74.5 kg)   04/15/21 153 lb (69.4 kg)   21 153 lb 9.6 oz (69.7 kg)      Temp Readings from Last 3 Encounters:   04/15/21 98.3 °F (36.8 °C) (Oral)   21 98.5 °F (36.9 °C) (Oral)   20 98.3 °F (36.8 °C) (Oral)      BP Readings from Last 3 Encounters:   04/15/21 130/85   21 (!) 140/90   20 (!) 140/82      Pulse Readings from Last 3 Encounters:   04/15/21 96   21 (!) 103   20 97      Vitals:    22 1258   Resp: 16   Weight: 164 lb 3.2 oz (74.5 kg)   Height: 5' 6\" (1.676 m)   PainSc:   0 - No pain          Learning Assessment:   :     No flowsheet data found.      Depression Screening:   :     3 most recent PHQ Screens 4/15/2021   Little interest or pleasure in doing things Not at all   Feeling down, depressed, irritable, or hopeless Not at all   Total Score PHQ 2 0   Trouble falling or staying asleep, or sleeping too much -   Feeling tired or having little energy -   Poor appetite, weight loss, or overeating - Feeling bad about yourself - or that you are a failure or have let yourself or your family down -   Trouble concentrating on things such as school, work, reading, or watching TV -   Moving or speaking so slowly that other people could have noticed; or the opposite being so fidgety that others notice -   Thoughts of being better off dead, or hurting yourself in some way -   PHQ 9 Score -        Fall Risk Assessment:   :     Fall Risk Assessment, last 12 mths 3/4/2021   Able to walk? Yes   Fall in past 12 months? 0   Do you feel unsteady? 0   Are you worried about falling 0        Abuse Screening:   :     Abuse Screening Questionnaire 11/26/2018   Do you ever feel afraid of your partner? N   Are you in a relationship with someone who physically or mentally threatens you? N   Is it safe for you to go home?  Y        ADL Screening:   :     ADL Assessment 11/26/2018   Feeding yourself No Help Needed   Getting from bed to chair No Help Needed   Getting dressed No Help Needed   Bathing or showering No Help Needed   Walk across the room (includes cane/walker) No Help Needed   Using the telphone No Help Needed   Taking your medications No Help Needed   Preparing meals No Help Needed   Managing money (expenses/bills) No Help Needed   Moderately strenuous housework (laundry) No Help Needed   Shopping for personal items (toiletries/medicines) No Help Needed   Shopping for groceries No Help Needed   Driving No Help Needed   Climbing a flight of stairs No Help Needed   Getting to places beyond walking distances No Help Needed

## 2022-03-19 PROBLEM — I10 ESSENTIAL HYPERTENSION: Status: ACTIVE | Noted: 2022-03-01

## 2022-03-20 PROBLEM — F41.9 ANXIETY: Status: ACTIVE | Noted: 2019-05-07

## 2022-03-31 NOTE — PROGRESS NOTES
HPI  Carline Schlatter is a 60y.o. year old female patient of Yulia Warner MD who presents with c/o   Chief Complaint   Patient presents with    Hypertension     Room 1A //     Yeast Infection     x 4 days // itchy // a little discharge       Pt has history of has Anxiety and Essential hypertension on their problem list..    1 mo fu HTN. Last visit we added HCTZ and she got a BMP which showed low sodium. BP at home has been 150/70-80. Denies cp pressure sob [place] or le edema. C/o vaginal itchy and white discharge x few days. Denies urinary symptoms. Tried monistat cream which didn't help, just temporarily. No concern for STI. States she had severe stomach bug few weeks ago and had terrible nausea after. Having bad heartburn and tried rolaids, famotidine. Was having heartburn at night when lying down, not as bad now. No longer having n/v/d. Asking for referral to GI. The 10-year ASCVD risk score (Shauna Dinh., et al., 2013) is: 6.7%    Values used to calculate the score:      Age: 60 years      Sex: Female      Is Non- : No      Diabetic: No      Tobacco smoker: No      Systolic Blood Pressure: 018 mmHg      Is BP treated: Yes      HDL Cholesterol: 51 mg/dL      Total Cholesterol: 241 mg/dL      Lab Results   Component Value Date/Time    Sodium 127 (L) 03/31/2022 10:37 AM    Potassium 4.4 03/31/2022 10:37 AM    Chloride 90 (L) 03/31/2022 10:37 AM    CO2 22 03/31/2022 10:37 AM    Glucose 95 03/31/2022 10:37 AM    BUN 7 03/31/2022 10:37 AM    Creatinine 0.87 03/31/2022 10:37 AM    BUN/Creatinine ratio 8 (L) 03/31/2022 10:37 AM    GFR est  04/17/2021 10:18 AM    GFR est non-AA 97 04/17/2021 10:18 AM    Calcium 9.9 03/31/2022 10:37 AM    Bilirubin, total 0.3 03/31/2022 10:37 AM    Alk.  phosphatase 110 03/31/2022 10:37 AM    Protein, total 7.9 03/31/2022 10:37 AM    Albumin 4.5 03/31/2022 10:37 AM    Globulin 3.1 04/17/2021 10:18 AM    A-G Ratio 1.3 03/31/2022 10:37 AM ALT (SGPT) 25 2022 10:37 AM    AST (SGOT) 32 2022 10:37 AM       Health Maintenance Overdue  Health Maintenance Due   Topic Date Due    Cervical cancer screen  Never done    Colorectal Cancer Screening Combo  Never done       Depression Screen  3 most recent PHQ Screens 3/1/2022   Little interest or pleasure in doing things Not at all   Feeling down, depressed, irritable, or hopeless Not at all   Total Score PHQ 2 0   Trouble falling or staying asleep, or sleeping too much -   Feeling tired or having little energy -   Poor appetite, weight loss, or overeating -   Feeling bad about yourself - or that you are a failure or have let yourself or your family down -   Trouble concentrating on things such as school, work, reading, or watching TV -   Moving or speaking so slowly that other people could have noticed; or the opposite being so fidgety that others notice -   Thoughts of being better off dead, or hurting yourself in some way -   PHQ 9 Score -           Patient Active Problem List   Diagnosis Code    Anxiety F41.9    Essential hypertension I10     No past medical history on file.   Past Surgical History:   Procedure Laterality Date    HX TONSILLECTOMY      SC EXTRAC ERUPTED TOOTH/EXPOSED ROOT Bilateral 3/30/2016    Patient Has Implants     Social History     Socioeconomic History    Marital status:    Tobacco Use    Smoking status: Former Smoker     Quit date: 2014     Years since quittin.3    Smokeless tobacco: Never Used   Vaping Use    Vaping Use: Never used   Substance and Sexual Activity    Alcohol use: Yes     Comment: few drinks per month    Drug use: No    Sexual activity: Yes     Partners: Male     Birth control/protection: None     Family History   Problem Relation Age of Onset    Hypertension Mother     Heart Disease Father 62        MI    Lung Disease Father         smoker, emphysema    No Known Problems Sister     Hypertension Brother     Diabetes Paternal Grandmother      No Known Allergies    MEDICATIONS  Current Outpatient Medications   Medication Sig    hydroCHLOROthiazide (HYDRODIURIL) 12.5 mg tablet Take 1 Tablet by mouth daily.  losartan (COZAAR) 100 mg tablet TAKE 1 TABLET BY MOUTH EVERY DAY    Cetirizine (ZyrTEC) 10 mg cap Take  by mouth.  FLUoxetine (PROzac) 10 mg tablet TAKE 1 TABLET BY MOUTH  DAILY    aspirin delayed-release 81 mg tablet Take 81 mg by mouth.  trospium (SANCTURA) 20 mg tablet TK 1 T PO BID    calcium-cholecalciferol, D3, (CALTRATE 600+D) tablet Take 1 Tab by mouth daily. No current facility-administered medications for this visit. REVIEW OF SYSTEMS  Per HPI        Visit Vitals  BP (!) 140/85   Pulse 89   Temp 98.1 °F (36.7 °C) (Oral)   Resp 16   Ht 5' 6\" (1.676 m)   Wt 159 lb 6.4 oz (72.3 kg)   SpO2 97%   BMI 25.73 kg/m²         General: Well-developed, well-nourished. In no distress. A&O x 3. Head: Normocephalic, atraumatic. Eyes: Conjunctiva clear. Neck: Supple, symmetrical, trachea midline, no lymphadenopathy, no carotid bruits, no JVD, thyroid: not enlarged, symmetric, no tenderness/mass/nodules. Lungs: Clear to auscultation bilaterally. No crackles or wheezes. No use of accessory muscles. Speaks in full sentences without SOB. Chest Wall: No tenderness or deformity. Heart: RRR, normal S1 and S2, no murmur, click, rub, or gallop. Skin: No rashes or lesions. Neurovasc: No edema appreciated. Dorsalis pedis pulses are 2+ on the right side, and 2+ on the left side. Posterior tibial pulses are 2+ on the right side, and 2+ on the left side. Musculoskeletal: Gait normal.   Psychiatric: Normal mood and affect. Behavior is normal.       No results found for any visits on 04/01/22. ASSESSMENT and PLAN  Diagnoses and all orders for this visit:    1. Essential hypertension  -     losartan (COZAAR) 100 mg tablet; Take 1 Tablet by mouth daily. -     amLODIPine (NORVASC) 5 mg tablet;  Take 1 Tablet by mouth daily. STOP HCTZ due to hyponatremia  START amlodipine due to uncontrolled BP  Fu 4-6 weeks  Repeat bmp 4 wks    2. Vaginal yeast infection  -     fluconazole (DIFLUCAN) 150 mg tablet; Take 1 Tablet by mouth daily for 1 day. FDA advises cautious prescribing of oral fluconazole in pregnancy. Call back if sx persists and schedule pelvic exam    3. Gastroesophageal reflux disease without esophagitis  -     REFERRAL TO GASTROENTEROLOGY  Suspect r/t post gastroenteritis and we discussed sx should continue to improve, continue famotidine as needed, fu with GI if sx persist or worsen            Patient Instructions          High Blood Pressure: Care Instructions  Overview     It's normal for blood pressure to go up and down throughout the day. But if it stays up, you have high blood pressure. Another name for high blood pressure is hypertension. Despite what a lot of people think, high blood pressure usually doesn't cause headaches or make you feel dizzy or lightheaded. It usually has no symptoms. But it does increase your risk of stroke, heart attack, and other problems. You and your doctor will talk about your risks of these problems based on your blood pressure. Your doctor will give you a goal for your blood pressure. Your goal will be based on your health and your age. Lifestyle changes, such as eating healthy and being active, are always important to help lower blood pressure. You might also take medicine to reach your blood pressure goal.  Follow-up care is a key part of your treatment and safety. Be sure to make and go to all appointments, and call your doctor if you are having problems. It's also a good idea to know your test results and keep a list of the medicines you take. How can you care for yourself at home? Medical treatment  · If you stop taking your medicine, your blood pressure will go back up. You may take one or more types of medicine to lower your blood pressure.  Be safe with medicines. Take your medicine exactly as prescribed. Call your doctor if you think you are having a problem with your medicine. · Talk to your doctor before you start taking aspirin every day. Aspirin can help certain people lower their risk of a heart attack or stroke. But taking aspirin isn't right for everyone, because it can cause serious bleeding. · See your doctor regularly. You may need to see the doctor more often at first or until your blood pressure comes down. · If you are taking blood pressure medicine, talk to your doctor before you take decongestants or anti-inflammatory medicine, such as ibuprofen. Some of these medicines can raise blood pressure. · Learn how to check your blood pressure at home. Lifestyle changes  · Stay at a healthy weight. This is especially important if you put on weight around the waist. Losing even 10 pounds can help you lower your blood pressure. · If your doctor recommends it, get more exercise. Walking is a good choice. Bit by bit, increase the amount you walk every day. Try for at least 30 minutes on most days of the week. You also may want to swim, bike, or do other activities. · Avoid or limit alcohol. Talk to your doctor about whether you can drink any alcohol. · Try to limit how much sodium you eat to less than 2,300 milligrams (mg) a day. Your doctor may ask you to try to eat less than 1,500 mg a day. · Eat plenty of fruits (such as bananas and oranges), vegetables, legumes, whole grains, and low-fat dairy products. · Lower the amount of saturated fat in your diet. Saturated fat is found in animal products such as milk, cheese, and meat. Limiting these foods may help you lose weight and also lower your risk for heart disease. · Do not smoke. Smoking increases your risk for heart attack and stroke. If you need help quitting, talk to your doctor about stop-smoking programs and medicines. These can increase your chances of quitting for good.   When should you call for help? Call 911  anytime you think you may need emergency care. This may mean having symptoms that suggest that your blood pressure is causing a serious heart or blood vessel problem. Your blood pressure may be over 180/120. For example, call 911 if:    · You have symptoms of a heart attack. These may include:  ? Chest pain or pressure, or a strange feeling in the chest.  ? Sweating. ? Shortness of breath. ? Nausea or vomiting. ? Pain, pressure, or a strange feeling in the back, neck, jaw, or upper belly or in one or both shoulders or arms. ? Lightheadedness or sudden weakness. ? A fast or irregular heartbeat.     · You have symptoms of a stroke. These may include:  ? Sudden numbness, tingling, weakness, or loss of movement in your face, arm, or leg, especially on only one side of your body. ? Sudden vision changes. ? Sudden trouble speaking. ? Sudden confusion or trouble understanding simple statements. ? Sudden problems with walking or balance. ? A sudden, severe headache that is different from past headaches.     · You have severe back or belly pain. Do not wait until your blood pressure comes down on its own. Get help right away. Call your doctor now or seek immediate care if:    · Your blood pressure is much higher than normal (such as 180/120 or higher), but you don't have symptoms.     · You think high blood pressure is causing symptoms, such as:  ? Severe headache.  ? Blurry vision. Watch closely for changes in your health, and be sure to contact your doctor if:    · Your blood pressure measures higher than your doctor recommends at least 2 times. That means the top number is higher or the bottom number is higher, or both.     · You think you may be having side effects from your blood pressure medicine. Where can you learn more? Go to http://www.gray.com/  Enter [id number] in the search box to learn more about \"High Blood Pressure: Care Instructions. \"  Current as of: January 10, 2022               Content Version: 13.2  © 2006-2022 [employer]. Care instructions adapted under license by [employer] (which disclaims liability or warranty for this information). If you have questions about a medical condition or this instruction, always ask your healthcare professional. [address] any warranty or liability for your use of this information. Vaginal Yeast Infection: Care Instructions  Overview     A vaginal yeast infection is the growth of too many yeast cells in the vagina. This is common in women of all ages. Itching, vaginal discharge and irritation, and other symptoms can bother you. But yeast infections don't often cause other health problems. Some medicines can increase your risk of getting a yeast infection. These include antibiotics, birth control pills, hormones, and steroids. You may also be more likely to get a yeast infection if you are pregnant, have diabetes, douche, or wear tight clothes. With treatment, most yeast infections get better in 2 to 3 days. Follow-up care is a key part of your treatment and safety. Be sure to make and go to all appointments, and call your doctor if you are having problems. It's also a good idea to know your test results and keep a list of the medicines you take. How can you care for yourself at home? · Take your medicines exactly as prescribed. Call your doctor if you think you are having a problem with your medicine. · Ask your doctor about over-the-counter (OTC) medicines for yeast infections. They may cost less than prescription medicines. If you use an OTC treatment, read and follow all instructions on the label. · Don't use tampons while using a vaginal cream or suppository. The tampons can absorb the medicine. Use pads instead. · Wear loose cotton clothing. Don't wear nylon or other fabric that holds body heat and moisture close to the skin.   · Try sleeping without underwear. · Don't scratch. Relieve itching with a cold pack or a cool bath. · Don't wash your vaginal area more than once a day. Use plain water or a mild, unscented soap. Air-dry the vaginal area. · Change out of wet swimsuits after swimming. · If you are using a vaginal medicine, don't have sex until you have finished your treatment. But if you do have sex, don't depend on a condom or diaphragm for birth control. The oil in some vaginal medicines weakens latex. · Don't douche. When should you call for help? Call your doctor now or seek immediate medical care if:    · You have unexpected vaginal bleeding.     · You have new or increased pain in your vagina or pelvis. Watch closely for changes in your health, and be sure to contact your doctor if:    · You have a fever.     · You are not getting better after 2 days.     · Your symptoms come back after you finish your medicines. Where can you learn more? Go to http://www.gray.com/  Enter U750 in the search box to learn more about \"Vaginal Yeast Infection: Care Instructions. \"  Current as of: November 22, 2021               Content Version: 13.2  © 8937-7729 [employer]. Care instructions adapted under license by [employer] (which disclaims liability or warranty for this information). If you have questions about a medical condition or this instruction, always ask your healthcare professional. [place] [address] any warranty or liability for your use of this information. Please keep your follow-up appointment with Kaci Martinez MD.         Health Maintenance Due   Topic Date Due    Cervical cancer screen  Never done    Colorectal Cancer Screening Combo  Never done       I have discussed the diagnosis with the patient and the intended plan as seen in the above orders. Patient is in agreement.   The patient has received an after-visit summary and questions were answered concerning future plans. I have discussed medication side effects and warnings with the patient as well. Warning signs for the above conditions were discussed including when to call our office or go to the emergency room. The nurse provided the patient and/or family with advanced directive information if needed and encouraged the patient to provide a copy to the office when available.

## 2022-04-01 ENCOUNTER — OFFICE VISIT (OUTPATIENT)
Dept: INTERNAL MEDICINE CLINIC | Age: 57
End: 2022-04-01
Payer: COMMERCIAL

## 2022-04-01 VITALS
TEMPERATURE: 98.1 F | OXYGEN SATURATION: 97 % | BODY MASS INDEX: 25.62 KG/M2 | WEIGHT: 159.4 LBS | RESPIRATION RATE: 16 BRPM | DIASTOLIC BLOOD PRESSURE: 85 MMHG | HEART RATE: 89 BPM | HEIGHT: 66 IN | SYSTOLIC BLOOD PRESSURE: 140 MMHG

## 2022-04-01 DIAGNOSIS — B37.31 VAGINAL YEAST INFECTION: ICD-10-CM

## 2022-04-01 DIAGNOSIS — K21.9 GASTROESOPHAGEAL REFLUX DISEASE WITHOUT ESOPHAGITIS: ICD-10-CM

## 2022-04-01 DIAGNOSIS — I10 ESSENTIAL HYPERTENSION: Primary | ICD-10-CM

## 2022-04-01 LAB
ALBUMIN SERPL-MCNC: 4.5 G/DL (ref 3.8–4.9)
ALBUMIN/GLOB SERPL: 1.3 {RATIO} (ref 1.2–2.2)
ALP SERPL-CCNC: 110 IU/L (ref 44–121)
ALT SERPL-CCNC: 25 IU/L (ref 0–32)
AST SERPL-CCNC: 32 IU/L (ref 0–40)
BILIRUB SERPL-MCNC: 0.3 MG/DL (ref 0–1.2)
BUN SERPL-MCNC: 7 MG/DL (ref 6–24)
BUN/CREAT SERPL: 8 (ref 9–23)
CALCIUM SERPL-MCNC: 9.9 MG/DL (ref 8.7–10.2)
CHLORIDE SERPL-SCNC: 90 MMOL/L (ref 96–106)
CHOLEST SERPL-MCNC: 241 MG/DL (ref 100–199)
CO2 SERPL-SCNC: 22 MMOL/L (ref 20–29)
CREAT SERPL-MCNC: 0.87 MG/DL (ref 0.57–1)
EGFR: 77 ML/MIN/1.73
GLOBULIN SER CALC-MCNC: 3.4 G/DL (ref 1.5–4.5)
GLUCOSE SERPL-MCNC: 95 MG/DL (ref 65–99)
HDLC SERPL-MCNC: 51 MG/DL
LDLC SERPL CALC-MCNC: 174 MG/DL (ref 0–99)
POTASSIUM SERPL-SCNC: 4.4 MMOL/L (ref 3.5–5.2)
PROT SERPL-MCNC: 7.9 G/DL (ref 6–8.5)
SODIUM SERPL-SCNC: 127 MMOL/L (ref 134–144)
TRIGL SERPL-MCNC: 90 MG/DL (ref 0–149)
VLDLC SERPL CALC-MCNC: 16 MG/DL (ref 5–40)

## 2022-04-01 PROCEDURE — 99214 OFFICE O/P EST MOD 30 MIN: CPT | Performed by: NURSE PRACTITIONER

## 2022-04-01 RX ORDER — HYDROCHLOROTHIAZIDE 12.5 MG/1
12.5 TABLET ORAL DAILY
Qty: 90 TABLET | Refills: 1 | Status: CANCELLED | OUTPATIENT
Start: 2022-04-01

## 2022-04-01 RX ORDER — FLUCONAZOLE 150 MG/1
150 TABLET ORAL DAILY
Qty: 1 TABLET | Refills: 0 | Status: SHIPPED | OUTPATIENT
Start: 2022-04-01 | End: 2022-04-02

## 2022-04-01 RX ORDER — LOSARTAN POTASSIUM 100 MG/1
100 TABLET ORAL DAILY
Qty: 90 TABLET | Refills: 1 | Status: SHIPPED | OUTPATIENT
Start: 2022-04-01 | End: 2022-09-26 | Stop reason: SDUPTHER

## 2022-04-01 RX ORDER — AMLODIPINE BESYLATE 5 MG/1
5 TABLET ORAL DAILY
Qty: 90 TABLET | Refills: 1 | Status: SHIPPED | OUTPATIENT
Start: 2022-04-01 | End: 2022-09-20 | Stop reason: SDUPTHER

## 2022-04-01 NOTE — PROGRESS NOTES
Trisha Simpson  Identified pt with two pt identifiers(name and ). Chief Complaint   Patient presents with    Hypertension     Room 1A //     Yeast Infection     x 4 days // itchy // a little discharge       Reviewed record In preparation for visit and have obtained necessary documentation. 1. Have you been to the ER, urgent care clinic or hospitalized since your last visit? No     2. Have you seen or consulted any other health care providers outside of the [address] [place] [address] since your last visit? Include any pap smears or colon screening. Yes. Colonoscopy - Dr. Jeanine Gutiérrez. Patient has an advance directive. Vitals reviewed with provider.     Health Maintenance reviewed:     Health Maintenance Due   Topic    Cervical cancer screen     Colorectal Cancer Screening Combo           Wt Readings from Last 3 Encounters:   22 159 lb 6.4 oz (72.3 kg)   22 164 lb 3.2 oz (74.5 kg)   04/15/21 153 lb (69.4 kg)        Temp Readings from Last 3 Encounters:   22 98.1 °F (36.7 °C) (Oral)   22 98 °F (36.7 °C) (Oral)   04/15/21 98.3 °F (36.8 °C) (Oral)        BP Readings from Last 3 Encounters:   22 (!) 157/95   22 (!) 138/92   04/15/21 130/85        Pulse Readings from Last 3 Encounters:   22 89   22 88   04/15/21 96        Vitals:    22 1300   BP: (!) 157/95   Pulse: 89   Resp: 16   Temp: 98.1 °F (36.7 °C)   TempSrc: Oral   SpO2: 97%   Weight: 159 lb 6.4 oz (72.3 kg)   Height: 5' 6\" (1.676 m)   PainSc:   0 - No pain          Learning Assessment:   :       Learning Assessment 3/1/2022   PRIMARY LEARNER Patient   HIGHEST LEVEL OF EDUCATION - PRIMARY LEARNER  SOME COLLEGE   BARRIERS PRIMARY LEARNER NONE   PRIMARY LANGUAGE ENGLISH   LEARNER PREFERENCE PRIMARY READING   ANSWERED BY Patient   RELATIONSHIP SELF        Depression Screening:   :       3 most recent PHQ Screens 3/1/2022   Little interest or pleasure in doing things Not at all   Feeling down, depressed, irritable, or hopeless Not at all   Total Score PHQ 2 0   Trouble falling or staying asleep, or sleeping too much -   Feeling tired or having little energy -   Poor appetite, weight loss, or overeating -   Feeling bad about yourself - or that you are a failure or have let yourself or your family down -   Trouble concentrating on things such as school, work, reading, or watching TV -   Moving or speaking so slowly that other people could have noticed; or the opposite being so fidgety that others notice -   Thoughts of being better off dead, or hurting yourself in some way -   PHQ 9 Score -        Fall Risk Assessment:   :       Fall Risk Assessment, last 12 mths 3/4/2021   Able to walk? Yes   Fall in past 12 months? 0   Do you feel unsteady? 0   Are you worried about falling 0        Abuse Screening:   :       Abuse Screening Questionnaire 11/26/2018   Do you ever feel afraid of your partner? N   Are you in a relationship with someone who physically or mentally threatens you? N   Is it safe for you to go home?  Y        ADL Screening:   :       ADL Assessment 11/26/2018   Feeding yourself No Help Needed   Getting from bed to chair No Help Needed   Getting dressed No Help Needed   Bathing or showering No Help Needed   Walk across the room (includes cane/walker) No Help Needed   Using the telphone No Help Needed   Taking your medications No Help Needed   Preparing meals No Help Needed   Managing money (expenses/bills) No Help Needed   Moderately strenuous housework (laundry) No Help Needed   Shopping for personal items (toiletries/medicines) No Help Needed   Shopping for groceries No Help Needed   Driving No Help Needed   Climbing a flight of stairs No Help Needed   Getting to places beyond walking distances No Help Needed

## 2022-04-01 NOTE — PATIENT INSTRUCTIONS
High Blood Pressure: Care Instructions  Overview     It's normal for blood pressure to go up and down throughout the day. But if it stays up, you have high blood pressure. Another name for high blood pressure is hypertension. Despite what a lot of people think, high blood pressure usually doesn't cause headaches or make you feel dizzy or lightheaded. It usually has no symptoms. But it does increase your risk of stroke, heart attack, and other problems. You and your doctor will talk about your risks of these problems based on your blood pressure. Your doctor will give you a goal for your blood pressure. Your goal will be based on your health and your age. Lifestyle changes, such as eating healthy and being active, are always important to help lower blood pressure. You might also take medicine to reach your blood pressure goal.  Follow-up care is a key part of your treatment and safety. Be sure to make and go to all appointments, and call your doctor if you are having problems. It's also a good idea to know your test results and keep a list of the medicines you take. How can you care for yourself at home? Medical treatment  · If you stop taking your medicine, your blood pressure will go back up. You may take one or more types of medicine to lower your blood pressure. Be safe with medicines. Take your medicine exactly as prescribed. Call your doctor if you think you are having a problem with your medicine. · Talk to your doctor before you start taking aspirin every day. Aspirin can help certain people lower their risk of a heart attack or stroke. But taking aspirin isn't right for everyone, because it can cause serious bleeding. · See your doctor regularly. You may need to see the doctor more often at first or until your blood pressure comes down. · If you are taking blood pressure medicine, talk to your doctor before you take decongestants or anti-inflammatory medicine, such as ibuprofen.  Some of these medicines can raise blood pressure. · Learn how to check your blood pressure at home. Lifestyle changes  · Stay at a healthy weight. This is especially important if you put on weight around the waist. Losing even 10 pounds can help you lower your blood pressure. · If your doctor recommends it, get more exercise. Walking is a good choice. Bit by bit, increase the amount you walk every day. Try for at least 30 minutes on most days of the week. You also may want to swim, bike, or do other activities. · Avoid or limit alcohol. Talk to your doctor about whether you can drink any alcohol. · Try to limit how much sodium you eat to less than 2,300 milligrams (mg) a day. Your doctor may ask you to try to eat less than 1,500 mg a day. · Eat plenty of fruits (such as bananas and oranges), vegetables, legumes, whole grains, and low-fat dairy products. · Lower the amount of saturated fat in your diet. Saturated fat is found in animal products such as milk, cheese, and meat. Limiting these foods may help you lose weight and also lower your risk for heart disease. · Do not smoke. Smoking increases your risk for heart attack and stroke. If you need help quitting, talk to your doctor about stop-smoking programs and medicines. These can increase your chances of quitting for good. When should you call for help? Call 911  anytime you think you may need emergency care. This may mean having symptoms that suggest that your blood pressure is causing a serious heart or blood vessel problem. Your blood pressure may be over 180/120. For example, call 911 if:    · You have symptoms of a heart attack. These may include:  ? Chest pain or pressure, or a strange feeling in the chest.  ? Sweating. ? Shortness of breath. ? Nausea or vomiting. ? Pain, pressure, or a strange feeling in the back, neck, jaw, or upper belly or in one or both shoulders or arms. ? Lightheadedness or sudden weakness.   ? A fast or irregular heartbeat.     · You have symptoms of a stroke. These may include:  ? Sudden numbness, tingling, weakness, or loss of movement in your face, arm, or leg, especially on only one side of your body. ? Sudden vision changes. ? Sudden trouble speaking. ? Sudden confusion or trouble understanding simple statements. ? Sudden problems with walking or balance. ? A sudden, severe headache that is different from past headaches.     · You have severe back or belly pain. Do not wait until your blood pressure comes down on its own. Get help right away. Call your doctor now or seek immediate care if:    · Your blood pressure is much higher than normal (such as 180/120 or higher), but you don't have symptoms.     · You think high blood pressure is causing symptoms, such as:  ? Severe headache.  ? Blurry vision. Watch closely for changes in your health, and be sure to contact your doctor if:    · Your blood pressure measures higher than your doctor recommends at least 2 times. That means the top number is higher or the bottom number is higher, or both.     · You think you may be having side effects from your blood pressure medicine. Where can you learn more? Go to http://www.gray.com/  Enter [id number] in the search box to learn more about \"High Blood Pressure: Care Instructions. \"  Current as of: January 10, 2022               Content Version: 13.2  © 2006-2022 [employer]. Care instructions adapted under license by [employer] (which disclaims liability or warranty for this information). If you have questions about a medical condition or this instruction, always ask your healthcare professional. [place] [address] any warranty or liability for your use of this information. Vaginal Yeast Infection: Care Instructions  Overview     A vaginal yeast infection is the growth of too many yeast cells in the vagina. This is common in women of all ages.  Itching, vaginal discharge and irritation, and other symptoms can bother you. But yeast infections don't often cause other health problems. Some medicines can increase your risk of getting a yeast infection. These include antibiotics, birth control pills, hormones, and steroids. You may also be more likely to get a yeast infection if you are pregnant, have diabetes, douche, or wear tight clothes. With treatment, most yeast infections get better in 2 to 3 days. Follow-up care is a key part of your treatment and safety. Be sure to make and go to all appointments, and call your doctor if you are having problems. It's also a good idea to know your test results and keep a list of the medicines you take. How can you care for yourself at home? · Take your medicines exactly as prescribed. Call your doctor if you think you are having a problem with your medicine. · Ask your doctor about over-the-counter (OTC) medicines for yeast infections. They may cost less than prescription medicines. If you use an OTC treatment, read and follow all instructions on the label. · Don't use tampons while using a vaginal cream or suppository. The tampons can absorb the medicine. Use pads instead. · Wear loose cotton clothing. Don't wear nylon or other fabric that holds body heat and moisture close to the skin. · Try sleeping without underwear. · Don't scratch. Relieve itching with a cold pack or a cool bath. · Don't wash your vaginal area more than once a day. Use plain water or a mild, unscented soap. Air-dry the vaginal area. · Change out of wet swimsuits after swimming. · If you are using a vaginal medicine, don't have sex until you have finished your treatment. But if you do have sex, don't depend on a condom or diaphragm for birth control. The oil in some vaginal medicines weakens latex. · Don't douche. When should you call for help?    Call your doctor now or seek immediate medical care if:    · You have unexpected vaginal bleeding.     · You have new or increased pain in your vagina or pelvis. Watch closely for changes in your health, and be sure to contact your doctor if:    · You have a fever.     · You are not getting better after 2 days.     · Your symptoms come back after you finish your medicines. Where can you learn more? Go to http://www.gray.com/  Enter U879 in the search box to learn more about \"Vaginal Yeast Infection: Care Instructions. \"  Current as of: November 22, 2021               Content Version: 13.2  © 2006-2022 [employer]. Care instructions adapted under license by [employer] (which disclaims liability or warranty for this information). If you have questions about a medical condition or this instruction, always ask your healthcare professional. [address] any warranty or liability for your use of this information.

## 2022-04-20 DIAGNOSIS — F41.9 ANXIETY: ICD-10-CM

## 2022-04-20 RX ORDER — FLUOXETINE 10 MG/1
TABLET ORAL
Qty: 90 TABLET | Refills: 3 | Status: SHIPPED | OUTPATIENT
Start: 2022-04-20 | End: 2022-09-13

## 2022-05-11 ENCOUNTER — OFFICE VISIT (OUTPATIENT)
Dept: INTERNAL MEDICINE CLINIC | Age: 57
End: 2022-05-11
Payer: COMMERCIAL

## 2022-05-11 VITALS
OXYGEN SATURATION: 98 % | DIASTOLIC BLOOD PRESSURE: 86 MMHG | SYSTOLIC BLOOD PRESSURE: 120 MMHG | BODY MASS INDEX: 25.52 KG/M2 | WEIGHT: 158.8 LBS | TEMPERATURE: 98.2 F | HEIGHT: 66 IN | HEART RATE: 88 BPM | RESPIRATION RATE: 16 BRPM

## 2022-05-11 DIAGNOSIS — F41.9 ANXIETY: ICD-10-CM

## 2022-05-11 DIAGNOSIS — E78.2 MIXED HYPERLIPIDEMIA: ICD-10-CM

## 2022-05-11 DIAGNOSIS — E87.1 HYPONATREMIA: ICD-10-CM

## 2022-05-11 DIAGNOSIS — I10 ESSENTIAL HYPERTENSION: Primary | ICD-10-CM

## 2022-05-11 PROCEDURE — 99214 OFFICE O/P EST MOD 30 MIN: CPT | Performed by: INTERNAL MEDICINE

## 2022-05-11 RX ORDER — ATORVASTATIN CALCIUM 20 MG/1
20 TABLET, FILM COATED ORAL DAILY
Qty: 90 TABLET | Refills: 1 | Status: SHIPPED | OUTPATIENT
Start: 2022-05-11 | End: 2022-10-29

## 2022-05-11 NOTE — PROGRESS NOTES
HPI  Ms. Santa Campbell is a 60y.o. year old female, she is seen today for follow up HTN. No chest pain or sob, dizziness, weakness. No longer has sx of yeast infection after treatment with diflucan. No n/v/abd pain, indigestion or heartburn. Can't tolerate hctz due to low sodium. On amlodipine now and off hctz. Mood is good, no anxiety or depression. The 10-year ASCVD risk score (Memo Leos, et al., 2013) is: 4%   Chief Complaint   Patient presents with    Blood Pressure Check     Room 2B //         Prior to Admission medications    Medication Sig Start Date End Date Taking? Authorizing Provider   atorvastatin (LIPITOR) 20 mg tablet Take 1 Tablet by mouth daily. 5/11/22  Yes Ford Hall MD   FLUoxetine (PROzac) 10 mg tablet TAKE 1 TABLET BY MOUTH EVERY DAY 4/20/22  Yes Ford Hall MD   losartan (COZAAR) 100 mg tablet Take 1 Tablet by mouth daily. 4/1/22  Yes Teagan Jorge NP   amLODIPine (NORVASC) 5 mg tablet Take 1 Tablet by mouth daily. 4/1/22  Yes Teagan Jorge NP   Cetirizine (ZyrTEC) 10 mg cap Take  by mouth. Yes Provider, Historical   aspirin delayed-release 81 mg tablet Take 81 mg by mouth. Yes Provider, Historical   trospium (SANCTURA) 20 mg tablet TK 1 T PO BID 10/6/20  Yes Provider, Historical   calcium-cholecalciferol, D3, (CALTRATE 600+D) tablet Take 1 Tab by mouth daily. Yes Provider, Historical   hydroCHLOROthiazide (HYDRODIURIL) 12.5 mg tablet Take 1 Tablet by mouth daily. Patient not taking: Reported on 5/11/2022 3/1/22 5/11/22  Teagan Jorge NP         No Known Allergies      REVIEW OF SYSTEMS:  Per HPI  PHYSICAL EXAM:  Visit Vitals  /86   Pulse 88   Temp 98.2 °F (36.8 °C) (Oral)   Resp 16   Ht 5' 6\" (1.676 m)   Wt 158 lb 12.8 oz (72 kg)   SpO2 98%   BMI 25.63 kg/m²     Constitutional: Appears well-developed and well-nourished. No distress. HENT:   Head: Normocephalic and atraumatic. Eyes: No scleral icterus.    Cardiovascular: Normal S1/S2, regular rhythm. No murmurs, rubs, or gallops. Pulmonary/Chest: Effort normal and breath sounds normal. No respiratory distress. No wheezes, rhonchi, or rales. Ext: No edema. Neurological: Alert. Psychiatric: Normal mood and affect. Behavior is normal.     Lab Results   Component Value Date/Time    Sodium 127 (L) 03/31/2022 10:37 AM    Potassium 4.4 03/31/2022 10:37 AM    Chloride 90 (L) 03/31/2022 10:37 AM    CO2 22 03/31/2022 10:37 AM    Glucose 95 03/31/2022 10:37 AM    BUN 7 03/31/2022 10:37 AM    Creatinine 0.87 03/31/2022 10:37 AM    BUN/Creatinine ratio 8 (L) 03/31/2022 10:37 AM    GFR est  04/17/2021 10:18 AM    GFR est non-AA 97 04/17/2021 10:18 AM    Calcium 9.9 03/31/2022 10:37 AM    Bilirubin, total 0.3 03/31/2022 10:37 AM    Alk. phosphatase 110 03/31/2022 10:37 AM    Protein, total 7.9 03/31/2022 10:37 AM    Albumin 4.5 03/31/2022 10:37 AM    Globulin 3.1 04/17/2021 10:18 AM    A-G Ratio 1.3 03/31/2022 10:37 AM    ALT (SGPT) 25 03/31/2022 10:37 AM     No results found for: HBA1C, [id number], [id number]   Lab Results   Component Value Date/Time    Cholesterol, total 241 (H) 03/31/2022 10:37 AM    HDL Cholesterol 51 03/31/2022 10:37 AM    LDL-CHOLESTEROL 155 (H) 04/17/2021 10:18 AM    LDL, calculated 174 (H) 03/31/2022 10:37 AM    LDL, calculated 134 (H) 01/24/2018 04:41 PM    VLDL, calculated 16 03/31/2022 10:37 AM    VLDL, calculated 27 01/24/2018 04:41 PM    Triglyceride 90 03/31/2022 10:37 AM    Cholesterol/HDL ratio 4.1 04/17/2021 10:18 AM          ASSESSMENT/PLAN  Diagnoses and all orders for this visit:    1. Essential hypertension  -     METABOLIC PANEL, BASIC; Future    2. Anxiety    3. Mixed hyperlipidemia  -     atorvastatin (LIPITOR) 20 mg tablet; Take 1 Tablet by mouth daily.  -     LIPID PANEL; Future  -     METABOLIC PANEL, COMPREHENSIVE; Future    4. Hyponatremia      BP at goal, continue current medications. Recheck BMP on hydrochlorothiazide.   Anxiety controlled, continue fluoxetine. Given high LDL and risk factors for CAD will add Lipitor 20 mg daily. Health Maintenance Due   Topic Date Due    Cervical cancer screen  Never done    Colorectal Cancer Screening Combo  Never done    COVID-19 Vaccine (3 - Booster for Pfizer series) 02/22/2022        Follow-up and Dispositions    · Return in about 4 months (around 9/11/2022) for bp, chol, labs prior. Reviewed plan of care. Patient has provided input and agrees with goals. The nurse provided the patient and/or family with advanced directive information if needed and encouraged the patient to provide a copy to the office when available.

## 2022-05-11 NOTE — PROGRESS NOTES
Viola Mccallum  Identified pt with two pt identifiers(name and ). Chief Complaint   Patient presents with    Blood Pressure Check     Room 2B //        Reviewed record In preparation for visit and have obtained necessary documentation. 1. Have you been to the ER, urgent care clinic or hospitalized since your last visit? No     2. Have you seen or consulted any other health care providers outside of the [address] [place] [address] since your last visit? Include any pap smears or colon screening. No    Patient has an advance directive. Vitals reviewed with provider.     Health Maintenance reviewed:     Health Maintenance Due   Topic    Cervical cancer screen     Colorectal Cancer Screening Combo     COVID-19 Vaccine (3 - Booster for Pfizer series)        Wt Readings from Last 3 Encounters:   22 158 lb 12.8 oz (72 kg)   22 159 lb 6.4 oz (72.3 kg)   22 164 lb 3.2 oz (74.5 kg)      Temp Readings from Last 3 Encounters:   22 98.1 °F (36.7 °C) (Oral)   22 98 °F (36.7 °C) (Oral)   04/15/21 98.3 °F (36.8 °C) (Oral)      BP Readings from Last 3 Encounters:   22 (!) 140/85   22 (!) 138/92   04/15/21 130/85      Pulse Readings from Last 3 Encounters:   22 89   22 88   04/15/21 96      Vitals:    22 1302   Resp: 16   Weight: 158 lb 12.8 oz (72 kg)   Height: 5' 6\" (1.676 m)   PainSc:   0 - No pain          Learning Assessment:   :     Learning Assessment 3/1/2022   PRIMARY LEARNER Patient   HIGHEST LEVEL OF EDUCATION - PRIMARY LEARNER  SOME COLLEGE   BARRIERS PRIMARY LEARNER NONE   PRIMARY LANGUAGE ENGLISH   LEARNER PREFERENCE PRIMARY READING   ANSWERED BY Patient   RELATIONSHIP SELF        Depression Screening:   :     3 most recent PHQ Screens 3/1/2022   Little interest or pleasure in doing things Not at all   Feeling down, depressed, irritable, or hopeless Not at all   Total Score PHQ 2 0   Trouble falling or staying asleep, or sleeping too much - Feeling tired or having little energy -   Poor appetite, weight loss, or overeating -   Feeling bad about yourself - or that you are a failure or have let yourself or your family down -   Trouble concentrating on things such as school, work, reading, or watching TV -   Moving or speaking so slowly that other people could have noticed; or the opposite being so fidgety that others notice -   Thoughts of being better off dead, or hurting yourself in some way -   PHQ 9 Score -        Fall Risk Assessment:   :     Fall Risk Assessment, last 12 mths 3/4/2021   Able to walk? Yes   Fall in past 12 months? 0   Do you feel unsteady? 0   Are you worried about falling 0        Abuse Screening:   :     Abuse Screening Questionnaire 11/26/2018   Do you ever feel afraid of your partner? N   Are you in a relationship with someone who physically or mentally threatens you? N   Is it safe for you to go home?  Y        ADL Screening:   :     ADL Assessment 11/26/2018   Feeding yourself No Help Needed   Getting from bed to chair No Help Needed   Getting dressed No Help Needed   Bathing or showering No Help Needed   Walk across the room (includes cane/walker) No Help Needed   Using the telphone No Help Needed   Taking your medications No Help Needed   Preparing meals No Help Needed   Managing money (expenses/bills) No Help Needed   Moderately strenuous housework (laundry) No Help Needed   Shopping for personal items (toiletries/medicines) No Help Needed   Shopping for groceries No Help Needed   Driving No Help Needed   Climbing a flight of stairs No Help Needed   Getting to places beyond walking distances No Help Needed

## 2022-05-14 LAB
BUN SERPL-MCNC: 6 MG/DL (ref 6–24)
BUN/CREAT SERPL: 8 (ref 9–23)
CALCIUM SERPL-MCNC: 9.8 MG/DL (ref 8.7–10.2)
CHLORIDE SERPL-SCNC: 97 MMOL/L (ref 96–106)
CO2 SERPL-SCNC: 23 MMOL/L (ref 20–29)
CREAT SERPL-MCNC: 0.75 MG/DL (ref 0.57–1)
EGFR: 92 ML/MIN/1.73
GLUCOSE SERPL-MCNC: 103 MG/DL (ref 65–99)
POTASSIUM SERPL-SCNC: 5.5 MMOL/L (ref 3.5–5.2)
SODIUM SERPL-SCNC: 134 MMOL/L (ref 134–144)

## 2022-05-15 DIAGNOSIS — E87.5 HYPERKALEMIA: Primary | ICD-10-CM

## 2022-05-15 NOTE — PROGRESS NOTES
Tell her normal sodium, but potassium little high - repeat K only this week - if still high will need to adjust losartan dose

## 2022-05-21 LAB — POTASSIUM SERPL-SCNC: 5.2 MMOL/L (ref 3.5–5.2)

## 2022-09-10 LAB
ALBUMIN SERPL-MCNC: 4.4 G/DL (ref 3.8–4.9)
ALBUMIN/GLOB SERPL: 1.4 {RATIO} (ref 1.2–2.2)
ALP SERPL-CCNC: 102 IU/L (ref 44–121)
ALT SERPL-CCNC: 19 IU/L (ref 0–32)
AST SERPL-CCNC: 29 IU/L (ref 0–40)
BILIRUB SERPL-MCNC: 0.4 MG/DL (ref 0–1.2)
BUN SERPL-MCNC: 9 MG/DL (ref 6–24)
BUN/CREAT SERPL: 11 (ref 9–23)
CALCIUM SERPL-MCNC: 9.8 MG/DL (ref 8.7–10.2)
CHLORIDE SERPL-SCNC: 98 MMOL/L (ref 96–106)
CHOLEST SERPL-MCNC: 167 MG/DL (ref 100–199)
CO2 SERPL-SCNC: 24 MMOL/L (ref 20–29)
CREAT SERPL-MCNC: 0.83 MG/DL (ref 0.57–1)
EGFR: 82 ML/MIN/1.73
GLOBULIN SER CALC-MCNC: 3.1 G/DL (ref 1.5–4.5)
GLUCOSE SERPL-MCNC: 101 MG/DL (ref 65–99)
HDLC SERPL-MCNC: 46 MG/DL
LDLC SERPL CALC-MCNC: 100 MG/DL (ref 0–99)
POTASSIUM SERPL-SCNC: 6.1 MMOL/L (ref 3.5–5.2)
PROT SERPL-MCNC: 7.5 G/DL (ref 6–8.5)
SODIUM SERPL-SCNC: 135 MMOL/L (ref 134–144)
TRIGL SERPL-MCNC: 118 MG/DL (ref 0–149)
VLDLC SERPL CALC-MCNC: 21 MG/DL (ref 5–40)

## 2022-09-11 DIAGNOSIS — E87.5 HYPERKALEMIA: Primary | ICD-10-CM

## 2022-09-12 ENCOUNTER — OFFICE VISIT (OUTPATIENT)
Dept: INTERNAL MEDICINE CLINIC | Age: 57
End: 2022-09-12
Payer: COMMERCIAL

## 2022-09-12 VITALS
WEIGHT: 158.2 LBS | HEART RATE: 98 BPM | HEIGHT: 66 IN | DIASTOLIC BLOOD PRESSURE: 76 MMHG | OXYGEN SATURATION: 97 % | SYSTOLIC BLOOD PRESSURE: 115 MMHG | RESPIRATION RATE: 16 BRPM | BODY MASS INDEX: 25.43 KG/M2 | TEMPERATURE: 98 F

## 2022-09-12 DIAGNOSIS — E78.2 MIXED HYPERLIPIDEMIA: ICD-10-CM

## 2022-09-12 DIAGNOSIS — I10 ESSENTIAL HYPERTENSION: Primary | ICD-10-CM

## 2022-09-12 DIAGNOSIS — Z23 NEEDS FLU SHOT: ICD-10-CM

## 2022-09-12 PROCEDURE — 90471 IMMUNIZATION ADMIN: CPT | Performed by: INTERNAL MEDICINE

## 2022-09-12 PROCEDURE — 99214 OFFICE O/P EST MOD 30 MIN: CPT | Performed by: INTERNAL MEDICINE

## 2022-09-12 PROCEDURE — 90686 IIV4 VACC NO PRSV 0.5 ML IM: CPT | Performed by: INTERNAL MEDICINE

## 2022-09-12 RX ORDER — CHLORTHALIDONE 25 MG/1
25 TABLET ORAL DAILY
Qty: 30 TABLET | Refills: 2 | Status: SHIPPED | OUTPATIENT
Start: 2022-09-12 | End: 2022-09-26

## 2022-09-12 NOTE — PROGRESS NOTES
HPI  Ms. Matilda Holder is a 60y.o. year old female, she is seen today for follow up HTN, cholesterol. No chest pain, sob, dizziness, weakness. No myalgia on statin. Had high potassium on losartan on recent labs. Chief Complaint   Patient presents with    Blood Pressure Check     Room 2B //     Cholesterol Problem    Immunization/Injection        Prior to Admission medications    Medication Sig Start Date End Date Taking? Authorizing Provider   atorvastatin (LIPITOR) 20 mg tablet Take 1 Tablet by mouth daily. 5/11/22  Yes Blayne Borges MD   losartan (COZAAR) 100 mg tablet Take 1 Tablet by mouth daily. 4/1/22  Yes Tarik Alfred NP   amLODIPine (NORVASC) 5 mg tablet Take 1 Tablet by mouth daily. 4/1/22  Yes Tarik Alfred NP   aspirin delayed-release 81 mg tablet Take 81 mg by mouth. Yes Provider, Historical   trospium (SANCTURA) 20 mg tablet TK 1 T PO BID 10/6/20  Yes Provider, Historical   calcium-cholecalciferol, D3, (CALTRATE 600+D) tablet Take 1 Tab by mouth daily. Yes Provider, Historical   FLUoxetine (PROzac) 10 mg tablet TAKE 1 TABLET BY MOUTH EVERY DAY  Patient not taking: Reported on 9/12/2022 4/20/22   Blayne Borges MD   Cetirizine (ZyrTEC) 10 mg cap Take  by mouth. Patient not taking: Reported on 9/12/2022    Provider, Historical         No Known Allergies      REVIEW OF SYSTEMS:  Per HPI    PHYSICAL EXAM:  Visit Vitals  /76 (BP 1 Location: Left upper arm, BP Patient Position: Sitting, BP Cuff Size: Adult)   Pulse 98   Temp 98 °F (36.7 °C) (Oral)   Resp 16   Ht 5' 6\" (1.676 m)   Wt 158 lb 3.2 oz (71.8 kg)   SpO2 97%   BMI 25.53 kg/m²     Constitutional: Appears well-developed and well-nourished. No distress. HENT:   Head: Normocephalic and atraumatic. Eyes: No scleral icterus. Neck: no lad, no tm, supple   Cardiovascular: Normal S1/S2, regular rhythm. No murmurs, rubs, or gallops. Pulmonary/Chest: Effort normal and breath sounds normal. No respiratory distress. No wheezes, rhonchi, or rales. Ext: No edema. Neurological: Alert. Psychiatric: Normal mood and affect. Behavior is normal.     Lab Results   Component Value Date/Time    Sodium 135 09/09/2022 09:44 AM    Potassium 6.1 (H) 09/09/2022 09:44 AM    Chloride 98 09/09/2022 09:44 AM    CO2 24 09/09/2022 09:44 AM    Glucose 101 (H) 09/09/2022 09:44 AM    BUN 9 09/09/2022 09:44 AM    Creatinine 0.83 09/09/2022 09:44 AM    BUN/Creatinine ratio 11 09/09/2022 09:44 AM    GFR est  04/17/2021 10:18 AM    GFR est non-AA 97 04/17/2021 10:18 AM    Calcium 9.8 09/09/2022 09:44 AM    Bilirubin, total 0.4 09/09/2022 09:44 AM    Alk. phosphatase 102 09/09/2022 09:44 AM    Protein, total 7.5 09/09/2022 09:44 AM    Albumin 4.4 09/09/2022 09:44 AM    Globulin 3.1 04/17/2021 10:18 AM    A-G Ratio 1.4 09/09/2022 09:44 AM    ALT (SGPT) 19 09/09/2022 09:44 AM     No results found for: HBA1C, [id number]   Lab Results   Component Value Date/Time    Cholesterol, total 167 09/09/2022 09:44 AM    HDL Cholesterol 46 09/09/2022 09:44 AM    LDL-CHOLESTEROL 155 (H) 04/17/2021 10:18 AM    LDL, calculated 100 (H) 09/09/2022 09:44 AM    LDL, calculated 134 (H) 01/24/2018 04:41 PM    VLDL, calculated 21 09/09/2022 09:44 AM    VLDL, calculated 27 01/24/2018 04:41 PM    Triglyceride 118 09/09/2022 09:44 AM    Cholesterol/HDL ratio 4.1 04/17/2021 10:18 AM          ASSESSMENT/PLAN  Diagnoses and all orders for this visit:    1. Essential hypertension  -     chlorthalidone (HYGROTON) 25 mg tablet; Take 1 Tablet by mouth daily.  -     METABOLIC PANEL, BASIC; Future    2. Needs flu shot  -     INFLUENZA, FLUARIX, FLULAVAL, FLUZONE (AGE 6 MO+), AFLURIA(AGE 3Y+) IM, PF, 0.5 ML    3. Mixed hyperlipidemia    Hyperkalemia. Stop losartan and add chlorthalidone keeping in mind she had low sodium and hydrochlorothiazide in the past.  Recheck BMP in a couple of weeks. Recheck K in 2 to 3 days. Continue amlodipine.   Lipids are excellent on Lipitor, continue. Health Maintenance Due   Topic Date Due    Cervical cancer screen  Never done    Flu Vaccine (1) 09/01/2022               Reviewed plan of care. Patient has provided input and agrees with goals. The nurse provided the patient and/or family with advanced directive information if needed and encouraged the patient to provide a copy to the office when available.

## 2022-09-12 NOTE — PATIENT INSTRUCTIONS
Vaccine Information Statement    Influenza (Flu) Vaccine (Inactivated or Recombinant): What You Need to Know    Many vaccine information statements are available in Korean and other languages. See www.immunize.org/vis. Hojas de información sobre vacunas están disponibles en español y en muchos otros idiomas. Visite www.immunize.org/vis. 1. Why get vaccinated? Influenza vaccine can prevent influenza (flu). Flu is a contagious disease that spreads around the [facility] every year, usually between October and May. Anyone can get the flu, but it is more dangerous for some people. Infants and young children, people 72 years and older, pregnant people, and people with certain health conditions or a weakened immune system are at greatest risk of flu complications. Pneumonia, bronchitis, sinus infections, and ear infections are examples of flu-related complications. If you have a medical condition, such as heart disease, cancer, or diabetes, flu can make it worse. Flu can cause fever and chills, sore throat, muscle aches, fatigue, cough, headache, and runny or stuffy nose. Some people may have vomiting and diarrhea, though this is more common in children than adults. In an average year, thousands of people in the [facility] die from flu, and many more are hospitalized. Flu vaccine prevents millions of illnesses and flu-related visits to the doctor each year. 2. Influenza vaccines     CDC recommends everyone 6 months and older get vaccinated every flu season. Children 6 months through 6years of age may need 2 doses during a single flu season. Everyone else needs only 1 dose each flu season. It takes about 2 weeks for protection to develop after vaccination. There are many flu viruses, and they are always changing. Each year a new flu vaccine is made to protect against the influenza viruses believed to be likely to cause disease in the upcoming flu season.  Even when the vaccine doesnt exactly match these viruses, it may still provide some protection. Influenza vaccine does not cause flu. Influenza vaccine may be given at the same time as other vaccines. 3. Talk with your health care provider    Tell your vaccination provider if the person getting the vaccine:  Has had an allergic reaction after a previous dose of influenza vaccine, or has any severe, life-threatening allergies   Has ever had Guillain-Barré Syndrome (also called GBS)    In some cases, your health care provider may decide to postpone influenza vaccination until a future visit. Influenza vaccine can be administered at any time during pregnancy. People who are or will be pregnant during influenza season should receive inactivated influenza vaccine. People with minor illnesses, such as a cold, may be vaccinated. People who are moderately or severely ill should usually wait until they recover before getting influenza vaccine. Your health care provider can give you more information. 4. Risks of a vaccine reaction    Soreness, redness, and swelling where the shot is given, fever, muscle aches, and headache can happen after influenza vaccination. There may be a very small increased risk of Guillain-Barré Syndrome (GBS) after inactivated influenza vaccine (the flu shot). [facility] children who get the flu shot along with pneumococcal vaccine (PCV13) and/or DTaP vaccine at the same time might be slightly more likely to have a seizure caused by fever. Tell your health care provider if a child who is getting flu vaccine has ever had a seizure. People sometimes faint after medical procedures, including vaccination. Tell your provider if you feel dizzy or have vision changes or ringing in the ears. As with any medicine, there is a very remote chance of a vaccine causing a severe allergic reaction, other serious injury, or death. 5. What if there is a serious problem?     An allergic reaction could occur after the vaccinated person leaves the clinic. If you see signs of a severe allergic reaction (hives, swelling of the face and throat, difficulty breathing, a fast heartbeat, dizziness, or weakness), call 9-1-1 and get the person to the nearest hospital.    For other signs that concern you, call your health care provider. Adverse reactions should be reported to the Vaccine Adverse Event Reporting System (VAERS). Your health care provider will usually file this report, or you can do it yourself. Visit the VAERS website at www.vaers. [facility].gov or call 1-715.539.6954. VAERS is only for reporting reactions, and VAERS staff members do not give medical advice. 6. The National Vaccine Injury Compensation Program    The [facility] Vaccine Injury Compensation Program (VICP) is a federal program that was created to compensate people who may have been injured by certain vaccines. Claims regarding alleged injury or death due to vaccination have a time limit for filing, which may be as short as two years. Visit the VICP website at www.[facility].gov/vaccinecompensation or call 8-910.184.1100 to learn about the program and about filing a claim. 7. How can I learn more? Ask your health care provider. Call your local or state health department. Visit the website of the Food and Drug Administration (FDA) for vaccine package inserts and additional information at www.fda.gov/vaccines-blood-biologics/vaccines. Contact the Centers for Disease Control and Prevention (CDC): Call 0-201.771.1777 (9-970-KDD-INFO) or  Visit CDCs influenza website at www.cdc.gov/flu. Vaccine Information Statement   Inactivated Influenza Vaccine   8/6/2021  [address] So [id number]-[id number]   Department of Health and Human Services  Centers for Disease Control and Prevention    Office Use Only

## 2022-09-12 NOTE — PROGRESS NOTES
Ebenezer Barron  Identified pt with two pt identifiers(name and ). Chief Complaint   Patient presents with    Blood Pressure Check     Room 2B //     Cholesterol Problem       Reviewed record In preparation for visit and have obtained necessary documentation. 1. Have you been to the ER, urgent care clinic or hospitalized since your last visit? No     2. Have you seen or consulted any other health care providers outside of the [address] [place] [address] since your last visit? Include any pap smears or colon screening. No    Patient has an advance directive. Vitals reviewed with provider. Health Maintenance reviewed: After verbal order read back of Dr. Ban Shrestha, patient received Flu Shot in right deltoid. Ja Rodriguez 47: [id number] Lot: [id number] Exp 23. Patient tolerated procedure without complaints and received VIS.     Health Maintenance Due   Topic    Cervical cancer screen     COVID-19 Vaccine (3 - Booster for Pfizer series)    Flu Vaccine (1)          Wt Readings from Last 3 Encounters:   22 158 lb 3.2 oz (71.8 kg)   22 158 lb 12.8 oz (72 kg)   22 159 lb 6.4 oz (72.3 kg)        Temp Readings from Last 3 Encounters:   22 98.2 °F (36.8 °C) (Oral)   22 98.1 °F (36.7 °C) (Oral)   22 98 °F (36.7 °C) (Oral)        BP Readings from Last 3 Encounters:   22 120/86   22 (!) 140/85   22 (!) 138/92        Pulse Readings from Last 3 Encounters:   22 88   22 89   22 88        Vitals:    22 1332   Resp: 16   Weight: 158 lb 3.2 oz (71.8 kg)   Height: 5' 6\" (1.676 m)   PainSc:   0 - No pain          Learning Assessment:   :       Learning Assessment 3/1/2022   PRIMARY LEARNER Patient   HIGHEST LEVEL OF EDUCATION - PRIMARY LEARNER  SOME COLLEGE   BARRIERS PRIMARY LEARNER NONE   PRIMARY LANGUAGE ENGLISH   LEARNER PREFERENCE PRIMARY READING   ANSWERED BY Patient   RELATIONSHIP SELF        Depression Screening:   :       3 most recent PHQ Screens 3/1/2022   Little interest or pleasure in doing things Not at all   Feeling down, depressed, irritable, or hopeless Not at all   Total Score PHQ 2 0   Trouble falling or staying asleep, or sleeping too much -   Feeling tired or having little energy -   Poor appetite, weight loss, or overeating -   Feeling bad about yourself - or that you are a failure or have let yourself or your family down -   Trouble concentrating on things such as school, work, reading, or watching TV -   Moving or speaking so slowly that other people could have noticed; or the opposite being so fidgety that others notice -   Thoughts of being better off dead, or hurting yourself in some way -   PHQ 9 Score -        Fall Risk Assessment:   :       Fall Risk Assessment, last 12 mths 3/4/2021   Able to walk? Yes   Fall in past 12 months? 0   Do you feel unsteady? 0   Are you worried about falling 0        Abuse Screening:   :       Abuse Screening Questionnaire 11/26/2018   Do you ever feel afraid of your partner? N   Are you in a relationship with someone who physically or mentally threatens you? N   Is it safe for you to go home?  Y        ADL Screening:   :       ADL Assessment 11/26/2018   Feeding yourself No Help Needed   Getting from bed to chair No Help Needed   Getting dressed No Help Needed   Bathing or showering No Help Needed   Walk across the room (includes cane/walker) No Help Needed   Using the telphone No Help Needed   Taking your medications No Help Needed   Preparing meals No Help Needed   Managing money (expenses/bills) No Help Needed   Moderately strenuous housework (laundry) No Help Needed   Shopping for personal items (toiletries/medicines) No Help Needed   Shopping for groceries No Help Needed   Driving No Help Needed   Climbing a flight of stairs No Help Needed   Getting to places beyond walking distances No Help Needed

## 2022-09-17 LAB — POTASSIUM SERPL-SCNC: 4.2 MMOL/L (ref 3.5–5.2)

## 2022-09-20 ENCOUNTER — TELEPHONE (OUTPATIENT)
Dept: INTERNAL MEDICINE CLINIC | Age: 57
End: 2022-09-20

## 2022-09-20 DIAGNOSIS — I10 ESSENTIAL HYPERTENSION: ICD-10-CM

## 2022-09-20 RX ORDER — AMLODIPINE BESYLATE 5 MG/1
5 TABLET ORAL DAILY
Qty: 90 TABLET | Refills: 1 | Status: SHIPPED | OUTPATIENT
Start: 2022-09-20 | End: 2022-10-12

## 2022-09-20 NOTE — TELEPHONE ENCOUNTER
Patient saw Corey Trammell last week and put on new medication Chlorthalidone. Because potassium was high. Patient started having nausea 2 to 3 days after taking medication. Patient did not take it today.   No diarrhea and no fever

## 2022-09-20 NOTE — TELEPHONE ENCOUNTER
PCP: Ann Almanza MD     Last appt: 9/12/2022     Future Appointments   Date Time Provider Aristides Vazquez   10/27/2022  9:45 AM Ann Almanza MD [facility] AMB          Requested Prescriptions     Pending Prescriptions Disp Refills    amLODIPine (NORVASC) 5 mg tablet 90 Tablet 1     Sig: Take 1 Tablet by mouth daily.

## 2022-09-20 NOTE — TELEPHONE ENCOUNTER
Not usual side effect - if nausea improved try restarting it.  If persists with restarting may need different medication

## 2022-09-21 ENCOUNTER — HOSPITAL ENCOUNTER (INPATIENT)
Age: 57
LOS: 5 days | Discharge: HOME | DRG: 641 | End: 2022-09-26
Attending: EMERGENCY MEDICINE | Admitting: HOSPITALIST
Payer: COMMERCIAL

## 2022-09-21 DIAGNOSIS — E87.1 HYPONATREMIA: Primary | ICD-10-CM

## 2022-09-21 DIAGNOSIS — E87.6 HYPOKALEMIA: ICD-10-CM

## 2022-09-21 DIAGNOSIS — I10 ESSENTIAL HYPERTENSION: ICD-10-CM

## 2022-09-21 LAB
ALBUMIN SERPL-MCNC: 3.6 G/DL (ref 3.5–5)
ALBUMIN SERPL-MCNC: 3.7 G/DL (ref 3.5–5)
ALBUMIN SERPL-MCNC: 4 G/DL (ref 3.5–5)
ALBUMIN/GLOB SERPL: 0.9 {RATIO} (ref 1.1–2.2)
ALBUMIN/GLOB SERPL: 0.9 {RATIO} (ref 1.1–2.2)
ALBUMIN/GLOB SERPL: 1.1 {RATIO} (ref 1.1–2.2)
ALP SERPL-CCNC: 116 U/L (ref 45–117)
ALP SERPL-CCNC: 118 U/L (ref 45–117)
ALP SERPL-CCNC: 126 U/L (ref 45–117)
ALT SERPL-CCNC: 37 U/L (ref 12–78)
ALT SERPL-CCNC: 38 U/L (ref 12–78)
ALT SERPL-CCNC: 40 U/L (ref 12–78)
ANION GAP SERPL CALC-SCNC: 6 MMOL/L (ref 5–15)
ANION GAP SERPL CALC-SCNC: 7 MMOL/L (ref 5–15)
ANION GAP SERPL CALC-SCNC: 8 MMOL/L (ref 5–15)
ANION GAP SERPL CALC-SCNC: 8 MMOL/L (ref 5–15)
APPEARANCE UR: CLEAR
APPEARANCE UR: CLEAR
AST SERPL-CCNC: 52 U/L (ref 15–37)
AST SERPL-CCNC: 56 U/L (ref 15–37)
AST SERPL-CCNC: 59 U/L (ref 15–37)
ATRIAL RATE: 78 BPM
BACTERIA URNS QL MICRO: NEGATIVE /HPF
BACTERIA URNS QL MICRO: NEGATIVE /HPF
BASOPHILS # BLD: 0.1 K/UL (ref 0–0.1)
BASOPHILS NFR BLD: 1 % (ref 0–1)
BILIRUB SERPL-MCNC: 0.5 MG/DL (ref 0.2–1)
BILIRUB SERPL-MCNC: 0.7 MG/DL (ref 0.2–1)
BILIRUB SERPL-MCNC: 0.7 MG/DL (ref 0.2–1)
BILIRUB UR QL: NEGATIVE
BILIRUB UR QL: NEGATIVE
BUN SERPL-MCNC: 10 MG/DL (ref 6–20)
BUN SERPL-MCNC: 10 MG/DL (ref 6–20)
BUN SERPL-MCNC: 9 MG/DL (ref 6–20)
BUN SERPL-MCNC: 9 MG/DL (ref 6–20)
BUN/CREAT SERPL: 10 (ref 12–20)
BUN/CREAT SERPL: 10 (ref 12–20)
BUN/CREAT SERPL: 11 (ref 12–20)
BUN/CREAT SERPL: 12 (ref 12–20)
CALCIUM SERPL-MCNC: 8.4 MG/DL (ref 8.5–10.1)
CALCIUM SERPL-MCNC: 8.4 MG/DL (ref 8.5–10.1)
CALCIUM SERPL-MCNC: 8.6 MG/DL (ref 8.5–10.1)
CALCIUM SERPL-MCNC: 9 MG/DL (ref 8.5–10.1)
CALCULATED P AXIS, ECG09: 64 DEGREES
CALCULATED R AXIS, ECG10: 66 DEGREES
CALCULATED T AXIS, ECG11: 55 DEGREES
CHLORIDE SERPL-SCNC: 67 MMOL/L (ref 97–108)
CHLORIDE SERPL-SCNC: 69 MMOL/L (ref 97–108)
CHLORIDE SERPL-SCNC: 74 MMOL/L (ref 97–108)
CHLORIDE SERPL-SCNC: 76 MMOL/L (ref 97–108)
CO2 SERPL-SCNC: 33 MMOL/L (ref 21–32)
CO2 SERPL-SCNC: 34 MMOL/L (ref 21–32)
CO2 SERPL-SCNC: 35 MMOL/L (ref 21–32)
CO2 SERPL-SCNC: 37 MMOL/L (ref 21–32)
COLOR UR: ABNORMAL
COLOR UR: ABNORMAL
CREAT SERPL-MCNC: 0.78 MG/DL (ref 0.55–1.02)
CREAT SERPL-MCNC: 0.86 MG/DL (ref 0.55–1.02)
CREAT SERPL-MCNC: 0.91 MG/DL (ref 0.55–1.02)
CREAT SERPL-MCNC: 0.97 MG/DL (ref 0.55–1.02)
DIAGNOSIS, 93000: NORMAL
DIFFERENTIAL METHOD BLD: ABNORMAL
EOSINOPHIL # BLD: 0 K/UL (ref 0–0.4)
EOSINOPHIL NFR BLD: 0 % (ref 0–7)
EPITH CASTS URNS QL MICRO: ABNORMAL /LPF
EPITH CASTS URNS QL MICRO: ABNORMAL /LPF
ERYTHROCYTE [DISTWIDTH] IN BLOOD BY AUTOMATED COUNT: 11.7 % (ref 11.5–14.5)
ERYTHROCYTE [DISTWIDTH] IN BLOOD BY AUTOMATED COUNT: 11.8 % (ref 11.5–14.5)
GLOBULIN SER CALC-MCNC: 3.8 G/DL (ref 2–4)
GLOBULIN SER CALC-MCNC: 3.9 G/DL (ref 2–4)
GLOBULIN SER CALC-MCNC: 3.9 G/DL (ref 2–4)
GLUCOSE SERPL-MCNC: 118 MG/DL (ref 65–100)
GLUCOSE SERPL-MCNC: 124 MG/DL (ref 65–100)
GLUCOSE SERPL-MCNC: 124 MG/DL (ref 65–100)
GLUCOSE SERPL-MCNC: 130 MG/DL (ref 65–100)
GLUCOSE UR STRIP.AUTO-MCNC: NEGATIVE MG/DL
GLUCOSE UR STRIP.AUTO-MCNC: NEGATIVE MG/DL
HCT VFR BLD AUTO: 33.5 % (ref 35–47)
HCT VFR BLD AUTO: 36 % (ref 35–47)
HGB BLD-MCNC: 12.5 G/DL (ref 11.5–16)
HGB BLD-MCNC: 13.4 G/DL (ref 11.5–16)
HGB UR QL STRIP: ABNORMAL
HGB UR QL STRIP: ABNORMAL
HYALINE CASTS URNS QL MICRO: ABNORMAL /LPF (ref 0–2)
HYALINE CASTS URNS QL MICRO: ABNORMAL /LPF (ref 0–2)
IMM GRANULOCYTES # BLD AUTO: 0.1 K/UL (ref 0–0.04)
IMM GRANULOCYTES NFR BLD AUTO: 1 % (ref 0–0.5)
KETONES UR QL STRIP.AUTO: ABNORMAL MG/DL
KETONES UR QL STRIP.AUTO: NEGATIVE MG/DL
LEUKOCYTE ESTERASE UR QL STRIP.AUTO: NEGATIVE
LEUKOCYTE ESTERASE UR QL STRIP.AUTO: NEGATIVE
LYMPHOCYTES # BLD: 1 K/UL (ref 0.8–3.5)
LYMPHOCYTES NFR BLD: 14 % (ref 12–49)
MAGNESIUM SERPL-MCNC: 2.2 MG/DL (ref 1.6–2.4)
MCH RBC QN AUTO: 29.1 PG (ref 26–34)
MCH RBC QN AUTO: 29.1 PG (ref 26–34)
MCHC RBC AUTO-ENTMCNC: 37.2 G/DL (ref 30–36.5)
MCHC RBC AUTO-ENTMCNC: 37.3 G/DL (ref 30–36.5)
MCV RBC AUTO: 77.9 FL (ref 80–99)
MCV RBC AUTO: 78.1 FL (ref 80–99)
MONOCYTES # BLD: 1 K/UL (ref 0–1)
MONOCYTES NFR BLD: 14 % (ref 5–13)
NEUTS SEG # BLD: 4.8 K/UL (ref 1.8–8)
NEUTS SEG NFR BLD: 70 % (ref 32–75)
NITRITE UR QL STRIP.AUTO: NEGATIVE
NITRITE UR QL STRIP.AUTO: NEGATIVE
NRBC # BLD: 0 K/UL (ref 0–0.01)
NRBC # BLD: 0 K/UL (ref 0–0.01)
NRBC BLD-RTO: 0 PER 100 WBC
NRBC BLD-RTO: 0 PER 100 WBC
P-R INTERVAL, ECG05: 190 MS
PH UR STRIP: 6.5 [PH] (ref 5–8)
PH UR STRIP: 7 [PH] (ref 5–8)
PHOSPHATE SERPL-MCNC: 1.4 MG/DL (ref 2.6–4.7)
PLATELET # BLD AUTO: 288 K/UL (ref 150–400)
PLATELET # BLD AUTO: 305 K/UL (ref 150–400)
PMV BLD AUTO: 10 FL (ref 8.9–12.9)
PMV BLD AUTO: 9.3 FL (ref 8.9–12.9)
POTASSIUM SERPL-SCNC: 2.1 MMOL/L (ref 3.5–5.1)
POTASSIUM SERPL-SCNC: 2.1 MMOL/L (ref 3.5–5.1)
POTASSIUM SERPL-SCNC: 2.4 MMOL/L (ref 3.5–5.1)
POTASSIUM SERPL-SCNC: 2.5 MMOL/L (ref 3.5–5.1)
PROT SERPL-MCNC: 7.5 G/DL (ref 6.4–8.2)
PROT SERPL-MCNC: 7.6 G/DL (ref 6.4–8.2)
PROT SERPL-MCNC: 7.8 G/DL (ref 6.4–8.2)
PROT UR STRIP-MCNC: NEGATIVE MG/DL
PROT UR STRIP-MCNC: NEGATIVE MG/DL
Q-T INTERVAL, ECG07: 412 MS
QRS DURATION, ECG06: 90 MS
QTC CALCULATION (BEZET), ECG08: 469 MS
RBC # BLD AUTO: 4.3 M/UL (ref 3.8–5.2)
RBC # BLD AUTO: 4.61 M/UL (ref 3.8–5.2)
RBC #/AREA URNS HPF: ABNORMAL /HPF (ref 0–5)
RBC #/AREA URNS HPF: ABNORMAL /HPF (ref 0–5)
SODIUM SERPL-SCNC: 110 MMOL/L (ref 136–145)
SODIUM SERPL-SCNC: 112 MMOL/L (ref 136–145)
SODIUM SERPL-SCNC: 116 MMOL/L (ref 136–145)
SODIUM SERPL-SCNC: 116 MMOL/L (ref 136–145)
SP GR UR REFRACTOMETRY: 1
SP GR UR REFRACTOMETRY: 1
UA: UC IF INDICATED,UAUC: ABNORMAL
UROBILINOGEN UR QL STRIP.AUTO: 0.2 EU/DL (ref 0.2–1)
UROBILINOGEN UR QL STRIP.AUTO: 0.2 EU/DL (ref 0.2–1)
VENTRICULAR RATE, ECG03: 78 BPM
WBC # BLD AUTO: 5 K/UL (ref 3.6–11)
WBC # BLD AUTO: 6.9 K/UL (ref 3.6–11)
WBC URNS QL MICRO: ABNORMAL /HPF (ref 0–4)
WBC URNS QL MICRO: ABNORMAL /HPF (ref 0–4)

## 2022-09-21 PROCEDURE — 84100 ASSAY OF PHOSPHORUS: CPT

## 2022-09-21 PROCEDURE — 80053 COMPREHEN METABOLIC PANEL: CPT

## 2022-09-21 PROCEDURE — 81001 URINALYSIS AUTO W/SCOPE: CPT

## 2022-09-21 PROCEDURE — 96361 HYDRATE IV INFUSION ADD-ON: CPT

## 2022-09-21 PROCEDURE — 36415 COLL VENOUS BLD VENIPUNCTURE: CPT

## 2022-09-21 PROCEDURE — 83735 ASSAY OF MAGNESIUM: CPT

## 2022-09-21 PROCEDURE — 85027 COMPLETE CBC AUTOMATED: CPT

## 2022-09-21 PROCEDURE — 74011250636 HC RX REV CODE- 250/636: Performed by: NURSE PRACTITIONER

## 2022-09-21 PROCEDURE — 74011250636 HC RX REV CODE- 250/636: Performed by: HOSPITALIST

## 2022-09-21 PROCEDURE — 96375 TX/PRO/DX INJ NEW DRUG ADDON: CPT

## 2022-09-21 PROCEDURE — 74011250637 HC RX REV CODE- 250/637: Performed by: HOSPITALIST

## 2022-09-21 PROCEDURE — 74011000250 HC RX REV CODE- 250: Performed by: NURSE PRACTITIONER

## 2022-09-21 PROCEDURE — 74011000250 HC RX REV CODE- 250: Performed by: HOSPITALIST

## 2022-09-21 PROCEDURE — 74011250637 HC RX REV CODE- 250/637: Performed by: NURSE PRACTITIONER

## 2022-09-21 PROCEDURE — 99285 EMERGENCY DEPT VISIT HI MDM: CPT

## 2022-09-21 PROCEDURE — 74011250636 HC RX REV CODE- 250/636

## 2022-09-21 PROCEDURE — 74011250636 HC RX REV CODE- 250/636: Performed by: EMERGENCY MEDICINE

## 2022-09-21 PROCEDURE — 96365 THER/PROPH/DIAG IV INF INIT: CPT

## 2022-09-21 PROCEDURE — 65610000006 HC RM INTENSIVE CARE

## 2022-09-21 PROCEDURE — 85025 COMPLETE CBC W/AUTO DIFF WBC: CPT

## 2022-09-21 PROCEDURE — 93005 ELECTROCARDIOGRAM TRACING: CPT

## 2022-09-21 RX ORDER — ASPIRIN 81 MG/1
81 TABLET ORAL DAILY
Status: DISCONTINUED | OUTPATIENT
Start: 2022-09-22 | End: 2022-09-26 | Stop reason: HOSPADM

## 2022-09-21 RX ORDER — ONDANSETRON 2 MG/ML
4 INJECTION INTRAMUSCULAR; INTRAVENOUS
Status: DISCONTINUED | OUTPATIENT
Start: 2022-09-21 | End: 2022-09-26 | Stop reason: HOSPADM

## 2022-09-21 RX ORDER — POTASSIUM CHLORIDE 7.45 MG/ML
10 INJECTION INTRAVENOUS
Status: DISCONTINUED | OUTPATIENT
Start: 2022-09-21 | End: 2022-09-21

## 2022-09-21 RX ORDER — LOSARTAN POTASSIUM 100 MG/1
100 TABLET ORAL DAILY
Qty: 90 TABLET | Refills: 1 | OUTPATIENT
Start: 2022-09-21

## 2022-09-21 RX ORDER — POLYETHYLENE GLYCOL 3350 17 G/17G
17 POWDER, FOR SOLUTION ORAL DAILY PRN
Status: DISCONTINUED | OUTPATIENT
Start: 2022-09-21 | End: 2022-09-26 | Stop reason: HOSPADM

## 2022-09-21 RX ORDER — HYDRALAZINE HYDROCHLORIDE 20 MG/ML
10 INJECTION INTRAMUSCULAR; INTRAVENOUS
Status: DISCONTINUED | OUTPATIENT
Start: 2022-09-21 | End: 2022-09-23

## 2022-09-21 RX ORDER — ACETAMINOPHEN 650 MG/1
650 SUPPOSITORY RECTAL
Status: DISCONTINUED | OUTPATIENT
Start: 2022-09-21 | End: 2022-09-26 | Stop reason: HOSPADM

## 2022-09-21 RX ORDER — SODIUM CHLORIDE 0.9 % (FLUSH) 0.9 %
5-40 SYRINGE (ML) INJECTION EVERY 8 HOURS
Status: DISCONTINUED | OUTPATIENT
Start: 2022-09-21 | End: 2022-09-26 | Stop reason: HOSPADM

## 2022-09-21 RX ORDER — ATORVASTATIN CALCIUM 20 MG/1
20 TABLET, FILM COATED ORAL DAILY
Status: DISCONTINUED | OUTPATIENT
Start: 2022-09-22 | End: 2022-09-26 | Stop reason: HOSPADM

## 2022-09-21 RX ORDER — SIMETHICONE 80 MG
40 TABLET,CHEWABLE ORAL
Status: COMPLETED | OUTPATIENT
Start: 2022-09-21 | End: 2022-09-21

## 2022-09-21 RX ORDER — MAGNESIUM SULFATE 1 G/100ML
1 INJECTION INTRAVENOUS ONCE
Status: COMPLETED | OUTPATIENT
Start: 2022-09-21 | End: 2022-09-21

## 2022-09-21 RX ORDER — AMLODIPINE BESYLATE 5 MG/1
5 TABLET ORAL DAILY
Status: DISCONTINUED | OUTPATIENT
Start: 2022-09-22 | End: 2022-09-23

## 2022-09-21 RX ORDER — POTASSIUM CHLORIDE 7.45 MG/ML
10 INJECTION INTRAVENOUS
Status: COMPLETED | OUTPATIENT
Start: 2022-09-21 | End: 2022-09-22

## 2022-09-21 RX ORDER — ACETAMINOPHEN 325 MG/1
650 TABLET ORAL
Status: DISCONTINUED | OUTPATIENT
Start: 2022-09-21 | End: 2022-09-26 | Stop reason: HOSPADM

## 2022-09-21 RX ORDER — ONDANSETRON 2 MG/ML
INJECTION INTRAMUSCULAR; INTRAVENOUS
Status: COMPLETED
Start: 2022-09-21 | End: 2022-09-21

## 2022-09-21 RX ORDER — ONDANSETRON 2 MG/ML
4 INJECTION INTRAMUSCULAR; INTRAVENOUS
Status: COMPLETED | OUTPATIENT
Start: 2022-09-21 | End: 2022-09-21

## 2022-09-21 RX ORDER — ONDANSETRON 2 MG/ML
4 INJECTION INTRAMUSCULAR; INTRAVENOUS
Status: DISCONTINUED | OUTPATIENT
Start: 2022-09-21 | End: 2022-09-25 | Stop reason: SDUPTHER

## 2022-09-21 RX ORDER — DEXTROSE MONOHYDRATE 50 MG/ML
100 INJECTION, SOLUTION INTRAVENOUS CONTINUOUS
Status: DISCONTINUED | OUTPATIENT
Start: 2022-09-21 | End: 2022-09-22

## 2022-09-21 RX ORDER — ONDANSETRON 4 MG/1
4 TABLET, ORALLY DISINTEGRATING ORAL
Status: DISCONTINUED | OUTPATIENT
Start: 2022-09-21 | End: 2022-09-26 | Stop reason: HOSPADM

## 2022-09-21 RX ORDER — ENOXAPARIN SODIUM 100 MG/ML
40 INJECTION SUBCUTANEOUS DAILY
Status: DISCONTINUED | OUTPATIENT
Start: 2022-09-22 | End: 2022-09-26 | Stop reason: HOSPADM

## 2022-09-21 RX ORDER — SODIUM CHLORIDE 0.9 % (FLUSH) 0.9 %
5-40 SYRINGE (ML) INJECTION AS NEEDED
Status: DISCONTINUED | OUTPATIENT
Start: 2022-09-21 | End: 2022-09-26 | Stop reason: HOSPADM

## 2022-09-21 RX ADMIN — POTASSIUM CHLORIDE 10 MEQ: 10 INJECTION, SOLUTION INTRAVENOUS at 21:31

## 2022-09-21 RX ADMIN — POTASSIUM CHLORIDE 10 MEQ: 10 INJECTION, SOLUTION INTRAVENOUS at 19:15

## 2022-09-21 RX ADMIN — ONDANSETRON 4 MG: 2 INJECTION INTRAMUSCULAR; INTRAVENOUS at 11:15

## 2022-09-21 RX ADMIN — SODIUM CHLORIDE, PRESERVATIVE FREE 10 ML: 5 INJECTION INTRAVENOUS at 22:59

## 2022-09-21 RX ADMIN — SODIUM CHLORIDE, PRESERVATIVE FREE 10 ML: 5 INJECTION INTRAVENOUS at 17:58

## 2022-09-21 RX ADMIN — SODIUM CHLORIDE 1000 ML: 9 INJECTION, SOLUTION INTRAVENOUS at 11:13

## 2022-09-21 RX ADMIN — POTASSIUM PHOSPHATE, MONOBASIC AND POTASSIUM PHOSPHATE, DIBASIC: 224; 236 INJECTION, SOLUTION, CONCENTRATE INTRAVENOUS at 20:32

## 2022-09-21 RX ADMIN — Medication 1 AMPULE: at 20:41

## 2022-09-21 RX ADMIN — POTASSIUM CHLORIDE 10 MEQ: 10 INJECTION, SOLUTION INTRAVENOUS at 20:28

## 2022-09-21 RX ADMIN — Medication 40 MG: at 22:59

## 2022-09-21 RX ADMIN — DEXTROSE MONOHYDRATE 100 ML/HR: 50 INJECTION, SOLUTION INTRAVENOUS at 15:57

## 2022-09-21 RX ADMIN — ONDANSETRON 4 MG: 4 TABLET, ORALLY DISINTEGRATING ORAL at 22:38

## 2022-09-21 RX ADMIN — POTASSIUM CHLORIDE 10 MEQ: 10 INJECTION, SOLUTION INTRAVENOUS at 23:41

## 2022-09-21 RX ADMIN — POTASSIUM CHLORIDE 10 MEQ: 10 INJECTION, SOLUTION INTRAVENOUS at 22:33

## 2022-09-21 RX ADMIN — POTASSIUM CHLORIDE 10 MEQ: 10 INJECTION, SOLUTION INTRAVENOUS at 16:39

## 2022-09-21 RX ADMIN — POTASSIUM CHLORIDE 10 MEQ: 10 INJECTION, SOLUTION INTRAVENOUS at 17:57

## 2022-09-21 RX ADMIN — MAGNESIUM SULFATE HEPTAHYDRATE 1 G: 1 INJECTION, SOLUTION INTRAVENOUS at 13:38

## 2022-09-21 RX ADMIN — POTASSIUM CHLORIDE 10 MEQ: 7.46 INJECTION, SOLUTION INTRAVENOUS at 15:16

## 2022-09-21 NOTE — ED NOTES
TRANSITION OF CARE - SBAR OUT    Patient is being transferred to [facility] Critical [facility] 1, Room# 2510. Report GIVEN TO Anila Wilson RN on Mayi Rosen for routine progression of care. Report is consisted of the following information SBAR, Kardex, ED Summary, MAR, and Cardiac Rhythm a . Patient transferred to receiving unit by: Wilfred Abebe  (RN or Tech Name)     Called outstanding consults:  na  na  Collected routine labs: Yes a    All current orders reviewed with accepting nurse: Yes    The following personal items will be sent with the patient during transfer to the floor: All valuables:                          CARDIAC MONITORING ORDERED: Yes a    The following CURRENT information were reported to the receiving RN:    CODE STATUS: Full Code    NIH SCORE:    YAZ SCREENING:      NEURO ASSESSMENT:        RESTRAINTS IN USE: No      IS DOCUMENTATION COMPLETE: Yes      Vital Signs  Level of Consciousness: Alert (0) (09/21/22 0941)  Temp: 98.1 °F (36.7 °C) (09/21/22 0941)  Temp Source: Oral (09/21/22 0941)  Pulse (Heart Rate): 91 (09/21/22 1401)  Heart Rate Source: Monitor (09/21/22 0941)  Resp Rate: 20 (09/21/22 1401)  BP: 118/68 (09/21/22 1401)  MAP (Monitor): 83 (09/21/22 1401)  MAP (Calculated): 85 (09/21/22 1401)  MEWS Score: 1 (09/21/22 0941)  Pain 1  Pain Scale 1: Numeric (0 - 10) (09/21/22 0941)  Pain Intensity 1: 7 (09/21/22 0941)      OXYGEN: Oxygen Therapy  O2 Device: None (09/21/22 0941)    KINDER FALL ASSESSMENT:  Presents to emergency department  because of falls (Syncope, seizure, or loss of consciousness): No, Age > 79: No, Altered Mental Status, Intoxication with alcohol or substance confusion (Disorientation, impaired judgment, poor safety awaremess, or inability to follow instructions): No, Impaired Mobility: Ambulates or transfers with assistive devices or assistance;  Unable to ambulate or transer.: No, Nursing Judgement : No    WOUNDS: No  a    URINARY CATHETER: voiding    LINE ACCESS: Peripheral IV 09/21/22 Right Antecubital (Active)   Site Assessment Clean, dry, & intact 09/21/22 0959   Phlebitis Assessment 0 09/21/22 0959   Infiltration Assessment 0 09/21/22 0959   Dressing Status Clean, dry, & intact 09/21/22 0959   Dressing Type Tape;Transparent 09/21/22 0959      Floor rn requesting that we draw labs prior to the patient coming up- per charge RN we are on full diversion and she wants the patient upstairs asap and they will have to draw her labs upstairs. Notified nataliia that her magnesium finished just prior to transfer and she has not yet had any of her runs of K+. Pt transported to the floor via wheelchari with [employer] monitor- ok with charge and Tawnya Grimm for questions and clarification were provided.   Ct Guzman RN

## 2022-09-21 NOTE — TELEPHONE ENCOUNTER
PCP: Destiny Mendes MD     Last appt: 9/12/2022     Future Appointments   Date Time Provider Aristides Vazquez   10/27/2022  9:45 AM Destiny Mendes MD [facility] AMB          Requested Prescriptions     Pending Prescriptions Disp Refills    losartan (COZAAR) 100 mg tablet 90 Tablet 1     Sig: Take 1 Tablet by mouth daily.

## 2022-09-21 NOTE — PROGRESS NOTES
1510- TRANSFER - IN REPORT:    Verbal report received from Mirlande Quintero RN (name) on Yuliya Goel  being received from ED (unit) for treatment of low potassium and low sodium. Dual skin assessment with Ranjan Garcia RN, reveals no wounds or sores, RUBIO 20    Report consisted of patients Situation, Background, Assessment and   Recommendations(SBAR). Information from the following report(s) SBAR, Kardex, Intake/Output, MAR, Recent Results, and Cardiac Rhythm SR  was reviewed with the receiving nurse. Opportunity for questions and clarification was provided. Assessment completed upon patients arrival to unit and care assumed. 32 61 16- Dr. Jenny Barrera at bedside talking with pt    1557- IV D5W started at this time     1635- Up to bedside commode with 1 assist, voiding without difficulty    1920- Bedside and Verbal shift change report given to Irvin Morris RN (oncoming nurse) by May Montalvo RN (offgoing nurse).  Report included the following information SBAR, Kardex, Intake/Output, MAR, Recent Results, and Cardiac Rhythm SR .

## 2022-09-21 NOTE — H&P
[facility] INITIAL ASSESSMENT. Name: Mayi Rosen   : 1965   MRN: [id number]   Date: 2022        Chief Complaint   Patient presents with    Nausea     Pt ambulatory into triage w/ cc nausea and vomiting x Saturday. Pt reports subsequent dizziness, belching, leg cramps and headache has also developed. Pt denies any diarrhea, cp or sob. Dizziness         HPI:     Ms. Gwen Knowles is a 76-year-old  female with history of hypertension and hyperlipidemia. Last week she was started on thiazide diuretic and taken off of losartan. She developed intractable nausea and vomiting about 4 days ago and finally decided to come to the ER today because she could not keep anything down. In ER she was found to have sodium of 110. She is getting admitted to ICU for further care. She reports cramping in her legs. Her potassium was also 2.1. She denies chest pain, abdominal pain, diarrhea, fever, URI symptoms, change in bowel or urinary habits. Problem list:     -Severe hyponatremia. - Severe hypokalemia. - Metabolic alkalosis. - History of hypertension.  - Hyperlipidemia. Assessment/Plan:     Severe hyponatremia. - Likely from hypovolemia in setting of intractable nausea and vomiting and also from thiazide diuretic. .  - Hold her diuretics. She was given 1 L of normal saline in the ED.  - Recheck sodium was 112. I will hold off on any more fluids and check BMP again in 2 hours. -Goal sodium with correction of 0.5 mEq/h and Correct sodium by no more than 10 mEq/L to 12 mEq/L in any 24 hour period. Severe hypokalemia and metabolic alkalosis. -From intractable nausea and vomiting.  - Symptomatic management for nausea and vomiting. Nausea and vomiting.  - Symptomatic management. - Abdominal exam is benign.  - Likely from viral gastroenteritis. History of hypertension.  - Hold blood pressure medications except amlodipine.  -Hydralzine PRN.       DVT prophylaxis: Lovenox  SUP: NA  Code status: Full code. I personally spent 40 minutes of critical care time. This is time spent at this critically ill patient's bedside actively involved in patient care as well as the coordination of care and discussions with the patient's family. This does not include any procedural time which has been billed separately. Review of systems:     Review of Systems   All other systems reviewed and are negative. Objective:     Vital Signs:  Visit Vitals  /85   Pulse 82   Temp 98.3 °F (36.8 °C)   Resp 16   Ht 5' 6\" (1.676 m)   Wt 69.9 kg (154 lb 1.6 oz)   SpO2 98%   Breastfeeding No   BMI 24.87 kg/m²      O2 Device: None (Room air) Temp (24hrs), Av.2 °F (36.8 °C), Min:98.1 °F (36.7 °C), Max:98.3 °F (36.8 °C)           Intake/Output:     Intake/Output Summary (Last 24 hours) at 2022 1742  Last data filed at 2022 1635  Gross per 24 hour   Intake 100 ml   Output 350 ml   Net -250 ml       Physical Exam:  Physical Exam  Constitutional:       Appearance: Normal appearance. HENT:      Head: Normocephalic and atraumatic. Mouth/Throat:      Mouth: Mucous membranes are moist.   Eyes:      Extraocular Movements: Extraocular movements intact. Conjunctiva/sclera: Conjunctivae normal.   Cardiovascular:      Rate and Rhythm: Normal rate and regular rhythm. Pulmonary:      Effort: Pulmonary effort is normal. No respiratory distress. Breath sounds: Normal breath sounds. Abdominal:      General: Abdomen is flat. There is no distension. Palpations: Abdomen is soft. Tenderness: There is no abdominal tenderness. There is no guarding. Musculoskeletal:      Cervical back: Normal range of motion and neck supple. Neurological:      Mental Status: She is alert and oriented to person, place, and time. Past Medical History:        has no past medical history on file.     Past Surgical History:      has a past surgical history that includes pr extrac erupted tooth/exposed root (Bilateral, 3/30/2016) and hx tonsillectomy. Home Medications:     Prior to Admission medications    Medication Sig Start Date End Date Taking? Authorizing Provider   amLODIPine (NORVASC) 5 mg tablet Take 1 Tablet by mouth daily. 22   Remigio Burnett MD   chlorthalidone (HYGROTON) 25 mg tablet Take 1 Tablet by mouth daily. 22   Remigio Burnett MD   atorvastatin (LIPITOR) 20 mg tablet Take 1 Tablet by mouth daily. 22   Remigio Burnett MD   losartan (COZAAR) 100 mg tablet Take 1 Tablet by mouth daily. 22   Sharon Mayberry NP   aspirin delayed-release 81 mg tablet Take 81 mg by mouth. Provider, Historical   trospium (SANCTURA) 20 mg tablet TK 1 T PO BID 10/6/20   Provider, Historical   calcium-cholecalciferol, D3, (CALTRATE 600+D) tablet Take 1 Tab by mouth daily. Provider, Historical         Allergies/Social/Family History: Allergies   Allergen Reactions    Hydrochlorothiazide Other (comments)     Low sodium      Social History     Tobacco Use    Smoking status: Former     Types: Cigarettes     Quit date: 2014     Years since quittin.8    Smokeless tobacco: Never   Substance Use Topics    Alcohol use: Yes     Comment: few drinks per month      Family History   Problem Relation Age of Onset    Hypertension Mother     Heart Disease Father 62        MI    Lung Disease Father         smoker, emphysema    No Known Problems Sister     Hypertension Brother     Diabetes Paternal Grandmother          LABS AND  DATA:   Reviewed      Peak airway pressure:      Minute ventilation:        CRITICAL CARE CONSULTANT NOTE  I had a face to face encounter with the patient, reviewed and interpreted patient data including clinical events, labs, images, vital signs, I/O's, and examined patient.   I have discussed the case and the plan and management of the patient's care with the consulting services, the bedside nurses and the respiratory therapist. NOTE OF PERSONAL INVOLVEMENT IN CARE   This patient has a high probability of imminent, clinically significant deterioration, which requires the highest level of preparedness to intervene urgently. I participated in the decision-making and personally managed or directed the management of the following life and organ supporting interventions that required my frequent assessment to treat or prevent imminent deterioration.         Mindi Zacarias MD   Pulmonary/[facility] Critical Care  [phone]  9/21/2022

## 2022-09-21 NOTE — PROGRESS NOTES
1900: Bedside shift change report given to Maida RN and Ct, RN (oncoming nurse) by Michael Vasquez, BRAULIO and BRAULIO Dobbins (offgoing nurse). Report included the following information SBAR.      2000: Assessment completed. Pt A&O x 4. Pt moves all extremities to command and spontaneously. Pt opens eyes spontaneously. Pupils brisk, round, and reactive. Pt on room air. Lung sounds coarse bilaterally. Active bowel sounds. Skin appropriate for ethnicity. Radial and pedal pulses palpable bilaterally. 2200: Pt complaining of lower abdominal cramping/ discomfort. Simethicone ordered. PRN PO Zofran administered. 2330: Assessment complete. No changes noted. Pt complaining of intermittent stomach pain. Pt states the pain feels similar to needing to pass gas. 0000: Pt reports that stomach pain has gone away. BMP collected and sent to lab. 0130: Pt continues to feel nauseated. Provider notified. Will administer IV Zofran per provider order. 0400: Assessment completed and labs drawn, including BMP. No changes noted. Pt had no complaints of pain and is resting comfortably. 8869: Critical lab received: Na is 114 and K 3. Provider notified. Will replete K with 40 mEq PO and 30 mEq IVPB, and K Phos IV, and D5 gtt rate change 100 per orders from NILA Patton.     0700: Bedside shift change report given to Umu Butcher RN (oncoming nurse) by Pham Morelos and BRAULIO Fofana (offgoing nurse). Report included the following information SBAR.

## 2022-09-21 NOTE — ED NOTES
Pt ambulatory and from bathroom with steady gait. Pt updated on plan of care.  Per Dr Jelena Hood, pt okay to have ice chips at this time but is otherwise NPO until seen by admitting MD

## 2022-09-21 NOTE — ED PROVIDER NOTES
EMERGENCY DEPARTMENT HISTORY AND PHYSICAL EXAM      Date: 9/21/2022  Patient Name: Tanika Sanchez  Patient Age and Sex: 60 y.o. female     History of Presenting Illness     Chief Complaint   Patient presents with    Nausea     Pt ambulatory into triage w/ cc nausea and vomiting x Saturday. Pt reports subsequent dizziness, belching, leg cramps and headache has also developed. Pt denies any diarrhea, cp or sob. Dizziness       History Provided By: Patient    HPI: Tanika Sanchez is a 57-year-old history below, recently started HCTZ presenting with nausea vomiting and dizziness. She reports for the past 4 to 5 days she has had persistent nausea with vomiting, dizziness belching bilateral leg cramping and headache. She denies any associated diarrhea, no blood in her vomitus no melena. No chest pain no dyspnea. Took a single dose of her HCTZ this past Friday however discontinued after that. There are no other complaints, changes, or physical findings at this time. PCP: Marisela Panchal MD    No current facility-administered medications on file prior to encounter. Current Outpatient Medications on File Prior to Encounter   Medication Sig Dispense Refill    amLODIPine (NORVASC) 5 mg tablet Take 1 Tablet by mouth daily. 90 Tablet 1    chlorthalidone (HYGROTON) 25 mg tablet Take 1 Tablet by mouth daily. 30 Tablet 2    atorvastatin (LIPITOR) 20 mg tablet Take 1 Tablet by mouth daily. 90 Tablet 1    losartan (COZAAR) 100 mg tablet Take 1 Tablet by mouth daily. 90 Tablet 1    aspirin delayed-release 81 mg tablet Take 81 mg by mouth. trospium (SANCTURA) 20 mg tablet TK 1 T PO BID      calcium-cholecalciferol, D3, (CALTRATE 600+D) tablet Take 1 Tab by mouth daily. Past History     Past Medical History:  No past medical history on file.     Past Surgical History:  Past Surgical History:   Procedure Laterality Date    HX TONSILLECTOMY      [place] EXTRAC ERUPTED TOOTH/EXPOSED ROOT Bilateral 3/30/2016 Patient Has Implants       Family History:  Family History   Problem Relation Age of Onset    Hypertension Mother     Heart Disease Father 62        MI    Lung Disease Father         smoker, emphysema    No Known Problems Sister     Hypertension Brother     Diabetes Paternal Grandmother        Social History:  Social History     Tobacco Use    Smoking status: Former     Types: Cigarettes     Quit date: 2014     Years since quittin.8    Smokeless tobacco: Never   Vaping Use    Vaping Use: Never used   Substance Use Topics    Alcohol use: Yes     Comment: few drinks per month    Drug use: No       Allergies: Allergies   Allergen Reactions    Hydrochlorothiazide Other (comments)     Low sodium         Review of Systems   Review of Systems   Constitutional:  Negative for chills and fever. HENT:  Negative for congestion and rhinorrhea. Respiratory:  Negative for shortness of breath. Cardiovascular:  Negative for chest pain. Gastrointestinal:  Positive for nausea and vomiting. Negative for abdominal pain. Genitourinary:  Negative for dysuria and frequency. Musculoskeletal:  Negative for myalgias. Positive for leg cramping   Neurological:  Positive for dizziness and headaches. All other systems reviewed and are negative. Physical Exam   Physical Exam  Vitals and nursing note reviewed. Constitutional:       Appearance: Normal appearance. HENT:      Head: Normocephalic. Mouth/Throat:      Mouth: Mucous membranes are dry. Eyes:      Conjunctiva/sclera: Conjunctivae normal.   Cardiovascular:      Rate and Rhythm: Normal rate and regular rhythm. Pulses: Normal pulses. Pulmonary:      Effort: Pulmonary effort is normal.      Breath sounds: Normal breath sounds. Abdominal:      General: Abdomen is flat. Palpations: Abdomen is soft. Tenderness: There is no abdominal tenderness. Musculoskeletal:         General: No deformity. Right lower leg: No edema. Left lower leg: No edema. Skin:     General: Skin is warm and dry. Neurological:      Mental Status: She is alert and oriented to person, place, and time. Comments: Gait ataxia, symmetric strength sensation bilateral upper lower extremities. Alert and oriented x4   Psychiatric:         Behavior: Behavior normal.         Thought Content: Thought content normal.        Diagnostic Study Results     Labs -     Recent Results (from the past 12 hour(s))   EKG, 12 LEAD, INITIAL    Collection Time: 09/21/22  9:52 AM   Result Value Ref Range    Ventricular Rate 78 BPM    Atrial Rate 78 BPM    P-R Interval 190 ms    QRS Duration 90 ms    Q-T Interval 412 ms    QTC Calculation (Bezet) 469 ms    Calculated P Axis 64 degrees    Calculated R Axis 66 degrees    Calculated T Axis 55 degrees    Diagnosis       Normal sinus rhythm  No previous ECGs available  Confirmed by Krysten Crawford ([id number]) on 9/21/2022 12:32:36 PM     CBC WITH AUTOMATED DIFF    Collection Time: 09/21/22  9:55 AM   Result Value Ref Range    WBC 6.9 3.6 - 11.0 K/uL    RBC 4.61 3.80 - 5.20 M/uL    HGB 13.4 11.5 - 16.0 g/dL    HCT 36.0 35.0 - 47.0 %    MCV 78.1 (L) 80.0 - 99.0 FL    MCH 29.1 26.0 - 34.0 PG    MCHC 37.2 (H) 30.0 - 36.5 g/dL    RDW 11.8 11.5 - 14.5 %    PLATELET 087 949 - 321 K/uL    MPV 10.0 8.9 - 12.9 FL    NRBC 0.0 0  WBC    ABSOLUTE NRBC 0.00 0.00 - 0.01 K/uL    NEUTROPHILS 70 32 - 75 %    LYMPHOCYTES 14 12 - 49 %    MONOCYTES 14 (H) 5 - 13 %    EOSINOPHILS 0 0 - 7 %    BASOPHILS 1 0 - 1 %    IMMATURE GRANULOCYTES 1 (H) 0.0 - 0.5 %    ABS. NEUTROPHILS 4.8 1.8 - 8.0 K/UL    ABS. LYMPHOCYTES 1.0 0.8 - 3.5 K/UL    ABS. MONOCYTES 1.0 0.0 - 1.0 K/UL    ABS. EOSINOPHILS 0.0 0.0 - 0.4 K/UL    ABS. BASOPHILS 0.1 0.0 - 0.1 K/UL    ABS. IMM.  GRANS. 0.1 (H) 0.00 - 0.04 K/UL    DF AUTOMATED     METABOLIC PANEL, COMPREHENSIVE    Collection Time: 09/21/22  9:55 AM   Result Value Ref Range    Sodium 110 (LL) 136 - 145 mmol/L    Potassium 2.4 (LL) 3.5 - 5.1 mmol/L    Chloride 67 (L) 97 - 108 mmol/L    CO2 35 (H) 21 - 32 mmol/L    Anion gap 8 5 - 15 mmol/L    Glucose 130 (H) 65 - 100 mg/dL    BUN 10 6 - 20 MG/DL    Creatinine 0.97 0.55 - 1.02 MG/DL    BUN/Creatinine ratio 10 (L) 12 - 20      GFR est AA >60 >60 ml/min/1.73m2    GFR est non-AA 59 (L) >60 ml/min/1.73m2    Calcium 9.0 8.5 - 10.1 MG/DL    Bilirubin, total 0.7 0.2 - 1.0 MG/DL    ALT (SGPT) 40 12 - 78 U/L    AST (SGOT) 59 (H) 15 - 37 U/L    Alk. phosphatase 126 (H) 45 - 117 U/L    Protein, total 7.8 6.4 - 8.2 g/dL    Albumin 4.0 3.5 - 5.0 g/dL    Globulin 3.8 2.0 - 4.0 g/dL    A-G Ratio 1.1 1.1 - 2.2     METABOLIC PANEL, COMPREHENSIVE    Collection Time: 09/21/22 11:27 AM   Result Value Ref Range    Sodium 112 (LL) 136 - 145 mmol/L    Potassium 2.1 (LL) 3.5 - 5.1 mmol/L    Chloride 69 (L) 97 - 108 mmol/L    CO2 37 (H) 21 - 32 mmol/L    Anion gap 6 5 - 15 mmol/L    Glucose 118 (H) 65 - 100 mg/dL    BUN 10 6 - 20 MG/DL    Creatinine 0.91 0.55 - 1.02 MG/DL    BUN/Creatinine ratio 11 (L) 12 - 20      GFR est AA >60 >60 ml/min/1.73m2    GFR est non-AA >60 >60 ml/min/1.73m2    Calcium 8.4 (L) 8.5 - 10.1 MG/DL    Bilirubin, total 0.7 0.2 - 1.0 MG/DL    ALT (SGPT) 37 12 - 78 U/L    AST (SGOT) 52 (H) 15 - 37 U/L    Alk.  phosphatase 116 45 - 117 U/L    Protein, total 7.6 6.4 - 8.2 g/dL    Albumin 3.7 3.5 - 5.0 g/dL    Globulin 3.9 2.0 - 4.0 g/dL    A-G Ratio 0.9 (L) 1.1 - 2.2     URINALYSIS W/ REFLEX CULTURE    Collection Time: 09/21/22 12:36 PM    Specimen: Urine   Result Value Ref Range    Color YELLOW/STRAW      Appearance CLEAR CLEAR      Specific gravity 1.005      pH (UA) 6.5 5.0 - 8.0      Protein Negative NEG mg/dL    Glucose Negative NEG mg/dL    Ketone TRACE (A) NEG mg/dL    Bilirubin Negative NEG      Blood SMALL (A) NEG      Urobilinogen 0.2 0.2 - 1.0 EU/dL    Nitrites Negative NEG      Leukocyte Esterase Negative NEG      UA:UC IF INDICATED CULTURE NOT INDICATED BY UA RESULT CNI      WBC 0-4 0 - 4 /hpf    RBC 0-5 0 - 5 /hpf    Epithelial cells FEW FEW /lpf    Bacteria Negative NEG /hpf    Hyaline cast 0-2 0 - 2 /lpf       Radiologic Studies -   No orders to display     CT Results  (Last 48 hours)      None          CXR Results  (Last 48 hours)      None              Medical Decision Making   I am the first provider for this patient. I reviewed the vital signs, available nursing notes, past medical history, past surgical history, family history and social history. Vital Signs-Reviewed the patient's vital signs. Patient Vitals for the past 12 hrs:   Temp Pulse Resp BP SpO2   09/21/22 1338 -- 91 -- 129/78 --   09/21/22 1042 -- -- -- (!) 141/87 --   09/21/22 0941 98.1 °F (36.7 °C) 84 20 (!) 140/84 97 %       Records Reviewed: Nursing Notes and Old Medical Records    Provider Notes (Medical Decision Making):   DDx acute electrolyte derangement, dehydration particular in setting of recently starting diuretic    Will obtain CBC CMP urinalysis. Abdomen soft nontender, doubt intra-abdominal pathology. Will give IV Zofran. ED Course:   Initial assessment performed. The patients presenting problems have been discussed, and they are in agreement with the care plan formulated and outlined with them. I have encouraged them to ask questions as they arise throughout their visit.     ED Course as of 09/21/22 1400   Wed Sep 21, 2022   1036 Sodium 110, potassium 2.4, chloride 67, will ensure this was not a diluted blood draw and recheck [WB]   1218 EKG shows sinus rhythm rate of 78 normal axis, QTC of 469 [WB]   1235 Lab work confirms hyponatremia 112 potassium 2.1, she was receiving IV fluids, is clinically dehydrated will give IV magnesium IV potassium and admit [WB]   1236 IV magnesium IV potassium ordered, Zofran given IV fluids given pending UA, hospitalist is paged for admission [WB]   [address] repaged for admission [WB]   1315 Discussed with hospitalist Dr Rock Chung, requested that I speak with intensivist given symptomatic hyponatremia.  [WB]   1322 Started for admission by Dr. Maricarmen Calderon, intensivist, recommends repeating BMP in 2 hours [WB]      ED Course User Index  [WB] Panfilo Montgomery MD     CRITICAL CARE NOTE :    1:59 PM    IMPENDING DETERIORATION -CNS  ASSOCIATED RISK FACTORS - CNS Decompensation  MANAGEMENT- Bedside Assessment and Supervision of Care  INTERPRETATION -  Xrays  INTERVENTIONS - Metobolic interventions  CASE REVIEW - Hospitalist/Intensivist and Nursing  TREATMENT RESPONSE -Stable  PERFORMED BY - Self    NOTES   :  I have spent 45 minutes of critical care time involved in lab review, consultations with specialist, family decision- making, bedside attention and documentation. This time excludes time spent in any separate billed procedures. During this entire length of time I was immediately available to the patient . Leonidas Slaughter MD      Disposition:    Admission Note:  Patient is being admitted to the hospital by Dr. Maricarmen Calderon, Service: [facility].  The results of their tests and reasons for their admission have been discussed with them and available family. They convey agreement and understanding for the need to be admitted and for their admission diagnosis. Diagnosis     Clinical Impression:   1. Hyponatremia    2. Hypokalemia        Attestations:    Leonidas Slaughter M.D. Please note that this dictation was completed with Pig[employer], the computer voice recognition software. Quite often unanticipated grammatical, syntax, homophones, and other interpretive errors are inadvertently transcribed by the computer software. Please disregard these errors. Please excuse any errors that have escaped final proofreading. Thank you.

## 2022-09-22 LAB
ALBUMIN SERPL-MCNC: 3.2 G/DL (ref 3.5–5)
ALBUMIN/GLOB SERPL: 0.9 {RATIO} (ref 1.1–2.2)
ALP SERPL-CCNC: 96 U/L (ref 45–117)
ALT SERPL-CCNC: 36 U/L (ref 12–78)
ANION GAP SERPL CALC-SCNC: 8 MMOL/L (ref 5–15)
ANION GAP SERPL CALC-SCNC: 9 MMOL/L (ref 5–15)
ANION GAP SERPL CALC-SCNC: 9 MMOL/L (ref 5–15)
AST SERPL-CCNC: 51 U/L (ref 15–37)
BILIRUB SERPL-MCNC: 0.7 MG/DL (ref 0.2–1)
BUN SERPL-MCNC: 6 MG/DL (ref 6–20)
BUN SERPL-MCNC: 6 MG/DL (ref 6–20)
BUN SERPL-MCNC: 7 MG/DL (ref 6–20)
BUN SERPL-MCNC: 7 MG/DL (ref 6–20)
BUN SERPL-MCNC: 8 MG/DL (ref 6–20)
BUN SERPL-MCNC: 8 MG/DL (ref 6–20)
BUN/CREAT SERPL: 10 (ref 12–20)
BUN/CREAT SERPL: 11 (ref 12–20)
BUN/CREAT SERPL: 12 (ref 12–20)
BUN/CREAT SERPL: 12 (ref 12–20)
BUN/CREAT SERPL: 8 (ref 12–20)
BUN/CREAT SERPL: 8 (ref 12–20)
CALCIUM SERPL-MCNC: 8.1 MG/DL (ref 8.5–10.1)
CALCIUM SERPL-MCNC: 8.2 MG/DL (ref 8.5–10.1)
CALCIUM SERPL-MCNC: 8.3 MG/DL (ref 8.5–10.1)
CALCIUM SERPL-MCNC: 8.4 MG/DL (ref 8.5–10.1)
CHLORIDE SERPL-SCNC: 78 MMOL/L (ref 97–108)
CHLORIDE SERPL-SCNC: 78 MMOL/L (ref 97–108)
CHLORIDE SERPL-SCNC: 80 MMOL/L (ref 97–108)
CHLORIDE SERPL-SCNC: 82 MMOL/L (ref 97–108)
CHLORIDE SERPL-SCNC: 85 MMOL/L (ref 97–108)
CHLORIDE SERPL-SCNC: 87 MMOL/L (ref 97–108)
CK SERPL-CCNC: 513 U/L (ref 26–192)
CO2 SERPL-SCNC: 25 MMOL/L (ref 21–32)
CO2 SERPL-SCNC: 26 MMOL/L (ref 21–32)
CO2 SERPL-SCNC: 27 MMOL/L (ref 21–32)
CO2 SERPL-SCNC: 30 MMOL/L (ref 21–32)
COMMENT, HOLDF: NORMAL
CREAT SERPL-MCNC: 0.61 MG/DL (ref 0.55–1.02)
CREAT SERPL-MCNC: 0.68 MG/DL (ref 0.55–1.02)
CREAT SERPL-MCNC: 0.69 MG/DL (ref 0.55–1.02)
CREAT SERPL-MCNC: 0.71 MG/DL (ref 0.55–1.02)
CREAT SERPL-MCNC: 0.72 MG/DL (ref 0.55–1.02)
CREAT SERPL-MCNC: 0.72 MG/DL (ref 0.55–1.02)
ERYTHROCYTE [DISTWIDTH] IN BLOOD BY AUTOMATED COUNT: 11.7 % (ref 11.5–14.5)
GLOBULIN SER CALC-MCNC: 3.5 G/DL (ref 2–4)
GLUCOSE SERPL-MCNC: 112 MG/DL (ref 65–100)
GLUCOSE SERPL-MCNC: 113 MG/DL (ref 65–100)
GLUCOSE SERPL-MCNC: 119 MG/DL (ref 65–100)
GLUCOSE SERPL-MCNC: 128 MG/DL (ref 65–100)
GLUCOSE SERPL-MCNC: 168 MG/DL (ref 65–100)
GLUCOSE SERPL-MCNC: 98 MG/DL (ref 65–100)
HCT VFR BLD AUTO: 32.7 % (ref 35–47)
HGB BLD-MCNC: 12.1 G/DL (ref 11.5–16)
MAGNESIUM SERPL-MCNC: 1.7 MG/DL (ref 1.6–2.4)
MAGNESIUM SERPL-MCNC: 1.8 MG/DL (ref 1.6–2.4)
MAGNESIUM SERPL-MCNC: 1.8 MG/DL (ref 1.6–2.4)
MCH RBC QN AUTO: 29.2 PG (ref 26–34)
MCHC RBC AUTO-ENTMCNC: 37 G/DL (ref 30–36.5)
MCV RBC AUTO: 79 FL (ref 80–99)
NRBC # BLD: 0 K/UL (ref 0–0.01)
NRBC BLD-RTO: 0 PER 100 WBC
PHOSPHATE SERPL-MCNC: 1.8 MG/DL (ref 2.6–4.7)
PHOSPHATE SERPL-MCNC: 3.2 MG/DL (ref 2.6–4.7)
PHOSPHATE SERPL-MCNC: 3.6 MG/DL (ref 2.6–4.7)
PLATELET # BLD AUTO: 261 K/UL (ref 150–400)
PMV BLD AUTO: 9.7 FL (ref 8.9–12.9)
POTASSIUM SERPL-SCNC: 3 MMOL/L (ref 3.5–5.1)
POTASSIUM SERPL-SCNC: 3.1 MMOL/L (ref 3.5–5.1)
POTASSIUM SERPL-SCNC: 3.3 MMOL/L (ref 3.5–5.1)
POTASSIUM SERPL-SCNC: 3.5 MMOL/L (ref 3.5–5.1)
POTASSIUM SERPL-SCNC: 4.1 MMOL/L (ref 3.5–5.1)
POTASSIUM SERPL-SCNC: 4.1 MMOL/L (ref 3.5–5.1)
PROCALCITONIN SERPL-MCNC: 0.1 NG/ML
PROT SERPL-MCNC: 6.7 G/DL (ref 6.4–8.2)
RBC # BLD AUTO: 4.14 M/UL (ref 3.8–5.2)
SAMPLES BEING HELD,HOLD: NORMAL
SODIUM SERPL-SCNC: 114 MMOL/L (ref 136–145)
SODIUM SERPL-SCNC: 115 MMOL/L (ref 136–145)
SODIUM SERPL-SCNC: 116 MMOL/L (ref 136–145)
SODIUM SERPL-SCNC: 117 MMOL/L (ref 136–145)
SODIUM SERPL-SCNC: 120 MMOL/L (ref 136–145)
SODIUM SERPL-SCNC: 120 MMOL/L (ref 136–145)
WBC # BLD AUTO: 17.4 K/UL (ref 3.6–11)

## 2022-09-22 PROCEDURE — 74011250637 HC RX REV CODE- 250/637: Performed by: INTERNAL MEDICINE

## 2022-09-22 PROCEDURE — 85027 COMPLETE CBC AUTOMATED: CPT

## 2022-09-22 PROCEDURE — 74011250636 HC RX REV CODE- 250/636: Performed by: HOSPITALIST

## 2022-09-22 PROCEDURE — 83735 ASSAY OF MAGNESIUM: CPT

## 2022-09-22 PROCEDURE — 80048 BASIC METABOLIC PNL TOTAL CA: CPT

## 2022-09-22 PROCEDURE — 80053 COMPREHEN METABOLIC PANEL: CPT

## 2022-09-22 PROCEDURE — 74011250636 HC RX REV CODE- 250/636: Performed by: INTERNAL MEDICINE

## 2022-09-22 PROCEDURE — 82550 ASSAY OF CK (CPK): CPT

## 2022-09-22 PROCEDURE — 74011250636 HC RX REV CODE- 250/636: Performed by: NURSE PRACTITIONER

## 2022-09-22 PROCEDURE — 87040 BLOOD CULTURE FOR BACTERIA: CPT

## 2022-09-22 PROCEDURE — 74011000250 HC RX REV CODE- 250: Performed by: HOSPITALIST

## 2022-09-22 PROCEDURE — 74011250637 HC RX REV CODE- 250/637: Performed by: NURSE PRACTITIONER

## 2022-09-22 PROCEDURE — 84100 ASSAY OF PHOSPHORUS: CPT

## 2022-09-22 PROCEDURE — 36415 COLL VENOUS BLD VENIPUNCTURE: CPT

## 2022-09-22 PROCEDURE — 74011250637 HC RX REV CODE- 250/637: Performed by: HOSPITALIST

## 2022-09-22 PROCEDURE — 74011000250 HC RX REV CODE- 250: Performed by: NURSE PRACTITIONER

## 2022-09-22 PROCEDURE — 65610000006 HC RM INTENSIVE CARE

## 2022-09-22 PROCEDURE — 84145 PROCALCITONIN (PCT): CPT

## 2022-09-22 PROCEDURE — 74011000258 HC RX REV CODE- 258: Performed by: NURSE PRACTITIONER

## 2022-09-22 RX ORDER — POTASSIUM CHLORIDE 20 MEQ/1
40 TABLET, EXTENDED RELEASE ORAL
Status: DISCONTINUED | OUTPATIENT
Start: 2022-09-22 | End: 2022-09-22

## 2022-09-22 RX ORDER — POTASSIUM CHLORIDE 750 MG/1
40 TABLET, FILM COATED, EXTENDED RELEASE ORAL
Status: COMPLETED | OUTPATIENT
Start: 2022-09-22 | End: 2022-09-22

## 2022-09-22 RX ORDER — DEXTROSE MONOHYDRATE 50 MG/ML
250 INJECTION, SOLUTION INTRAVENOUS ONCE
Status: COMPLETED | OUTPATIENT
Start: 2022-09-22 | End: 2022-09-22

## 2022-09-22 RX ORDER — DEXTROSE MONOHYDRATE 50 MG/ML
50 INJECTION, SOLUTION INTRAVENOUS CONTINUOUS
Status: DISCONTINUED | OUTPATIENT
Start: 2022-09-22 | End: 2022-09-23

## 2022-09-22 RX ORDER — MAGNESIUM SULFATE HEPTAHYDRATE 40 MG/ML
2 INJECTION, SOLUTION INTRAVENOUS ONCE
Status: COMPLETED | OUTPATIENT
Start: 2022-09-22 | End: 2022-09-22

## 2022-09-22 RX ORDER — POTASSIUM CHLORIDE 20 MEQ/1
40 TABLET, EXTENDED RELEASE ORAL
Status: COMPLETED | OUTPATIENT
Start: 2022-09-22 | End: 2022-09-22

## 2022-09-22 RX ORDER — POTASSIUM CHLORIDE 7.45 MG/ML
10 INJECTION INTRAVENOUS
Status: COMPLETED | OUTPATIENT
Start: 2022-09-22 | End: 2022-09-23

## 2022-09-22 RX ORDER — POTASSIUM CHLORIDE 7.45 MG/ML
10 INJECTION INTRAVENOUS
Status: COMPLETED | OUTPATIENT
Start: 2022-09-22 | End: 2022-09-22

## 2022-09-22 RX ADMIN — ACETAMINOPHEN 650 MG: 325 TABLET ORAL at 20:36

## 2022-09-22 RX ADMIN — POTASSIUM CHLORIDE 40 MEQ: 20 TABLET, EXTENDED RELEASE ORAL at 19:06

## 2022-09-22 RX ADMIN — Medication 1 AMPULE: at 08:44

## 2022-09-22 RX ADMIN — POTASSIUM CHLORIDE 10 MEQ: 10 INJECTION, SOLUTION INTRAVENOUS at 02:04

## 2022-09-22 RX ADMIN — DEXTROSE MONOHYDRATE 50 ML/HR: 50 INJECTION, SOLUTION INTRAVENOUS at 19:07

## 2022-09-22 RX ADMIN — ATORVASTATIN CALCIUM 20 MG: 20 TABLET, FILM COATED ORAL at 08:44

## 2022-09-22 RX ADMIN — POTASSIUM CHLORIDE 10 MEQ: 7.46 INJECTION, SOLUTION INTRAVENOUS at 05:48

## 2022-09-22 RX ADMIN — DEXTROSE MONOHYDRATE 125 ML/HR: 50 INJECTION, SOLUTION INTRAVENOUS at 00:19

## 2022-09-22 RX ADMIN — SODIUM CHLORIDE, PRESERVATIVE FREE 10 ML: 5 INJECTION INTRAVENOUS at 21:55

## 2022-09-22 RX ADMIN — POTASSIUM CHLORIDE 40 MEQ: 20 TABLET, EXTENDED RELEASE ORAL at 14:17

## 2022-09-22 RX ADMIN — SODIUM CHLORIDE, PRESERVATIVE FREE 10 ML: 5 INJECTION INTRAVENOUS at 14:14

## 2022-09-22 RX ADMIN — POTASSIUM CHLORIDE 10 MEQ: 7.46 INJECTION, SOLUTION INTRAVENOUS at 21:24

## 2022-09-22 RX ADMIN — POTASSIUM CHLORIDE 10 MEQ: 10 INJECTION, SOLUTION INTRAVENOUS at 03:07

## 2022-09-22 RX ADMIN — POTASSIUM CHLORIDE 40 MEQ: 750 TABLET, FILM COATED, EXTENDED RELEASE ORAL at 04:43

## 2022-09-22 RX ADMIN — SODIUM CHLORIDE, PRESERVATIVE FREE 10 ML: 5 INJECTION INTRAVENOUS at 05:38

## 2022-09-22 RX ADMIN — POTASSIUM CHLORIDE 40 MEQ: 20 TABLET, EXTENDED RELEASE ORAL at 16:54

## 2022-09-22 RX ADMIN — ASPIRIN 81 MG: 81 TABLET, COATED ORAL at 08:44

## 2022-09-22 RX ADMIN — DEXTROSE MONOHYDRATE 100 ML/HR: 50 INJECTION, SOLUTION INTRAVENOUS at 04:50

## 2022-09-22 RX ADMIN — POTASSIUM PHOSPHATE, MONOBASIC AND POTASSIUM PHOSPHATE, DIBASIC: 224; 236 INJECTION, SOLUTION, CONCENTRATE INTRAVENOUS at 05:54

## 2022-09-22 RX ADMIN — POTASSIUM CHLORIDE 10 MEQ: 7.46 INJECTION, SOLUTION INTRAVENOUS at 22:37

## 2022-09-22 RX ADMIN — DEXTROSE MONOHYDRATE 250 ML: 50 INJECTION, SOLUTION INTRAVENOUS at 01:00

## 2022-09-22 RX ADMIN — POTASSIUM CHLORIDE 10 MEQ: 7.46 INJECTION, SOLUTION INTRAVENOUS at 04:41

## 2022-09-22 RX ADMIN — MAGNESIUM SULFATE HEPTAHYDRATE 2 G: 40 INJECTION, SOLUTION INTRAVENOUS at 14:18

## 2022-09-22 RX ADMIN — POTASSIUM CHLORIDE 10 MEQ: 7.46 INJECTION, SOLUTION INTRAVENOUS at 19:08

## 2022-09-22 RX ADMIN — AMLODIPINE BESYLATE 5 MG: 5 TABLET ORAL at 08:44

## 2022-09-22 RX ADMIN — ENOXAPARIN SODIUM 40 MG: 100 INJECTION SUBCUTANEOUS at 08:44

## 2022-09-22 RX ADMIN — POTASSIUM CHLORIDE 10 MEQ: 10 INJECTION, SOLUTION INTRAVENOUS at 00:50

## 2022-09-22 RX ADMIN — POTASSIUM CHLORIDE 10 MEQ: 7.46 INJECTION, SOLUTION INTRAVENOUS at 06:50

## 2022-09-22 RX ADMIN — POTASSIUM CHLORIDE 10 MEQ: 7.46 INJECTION, SOLUTION INTRAVENOUS at 20:36

## 2022-09-22 RX ADMIN — ONDANSETRON 4 MG: 2 INJECTION INTRAMUSCULAR; INTRAVENOUS at 01:32

## 2022-09-22 RX ADMIN — Medication 1 AMPULE: at 21:23

## 2022-09-22 NOTE — PROGRESS NOTES
Comprehensive Nutrition Assessment    Type and Reason for Visit: Initial, Positive nutrition screen    Nutrition Recommendations/Plan:   Advance diet as medically able per MD  Replete lytes prn     Malnutrition Assessment:  Malnutrition Status:  Mild malnutrition (09/22/22 1321)    Context:  Acute illness     Findings of the 6 clinical characteristics of malnutrition:   Energy Intake:  50% or less of est energy requirements for 5 or more days  Weight Loss:  No significant weight loss     Body Fat Loss:  No significant body fat loss,     Muscle Mass Loss:  No significant muscle mass loss,    Fluid Accumulation:  No significant fluid accumulation,     Strength:  Not performed     Nutrition Assessment:  Pt admitted with hyponatremia. PMH: HTN. MST triggered for wt loss and poor appetite. Chart reviewed, case discussed during CCU rounds. Pt lying in bed awake and alert. She started having n/v Saturday and was unable to keep much down since then. She reports she is able to tolerate some PO now. Pt admits to wt loss (154lb upon admit, -160lb) however bed scale wt is 165lb. Wt loss likely primarily due to dehydration. Lytes dropped (K 3.0 and phos 1.8) may be an element of refeeding so will hold off on kcal supplements for now. Wt Readings from Last 10 Encounters:   09/22/22 75.2 kg (165 lb 12.6 oz)   09/12/22 71.8 kg (158 lb 3.2 oz)   05/11/22 72 kg (158 lb 12.8 oz)   04/01/22 72.3 kg (159 lb 6.4 oz)   03/01/22 74.5 kg (164 lb 3.2 oz)   04/15/21 69.4 kg (153 lb)   03/04/21 69.7 kg (153 lb 9.6 oz)   12/21/20 68 kg (150 lb)   11/19/20 68.9 kg (152 lb)   03/18/20 67.4 kg (148 lb 9.6 oz)            Nutrition Related Findings:    Meds: KCl, Kphos, zofran, [email or web address]   BM 9/22 Wound Type: None    Current Nutrition Intake & Therapies:  Average Meal Intake: 26-50%     ADULT DIET Clear Liquid    Anthropometric Measures:  Height: 5' 6\" (167.6 cm)  Ideal Body Weight (IBW): 130 lbs (59 kg)     Current Body Wt: 75.2 kg (165 lb 12.6 oz), 127.5 % IBW. Bed scale  Current BMI (kg/m2): 26.8  Usual Body Weight: 72.6 kg (160 lb)  % Weight Change (Calculated): 3.6                    BMI Category: Overweight (BMI 25.0-29. 9)    Estimated Daily Nutrient Needs:  Energy Requirements Based On: Formula  Weight Used for Energy Requirements: Current  Energy (kcal/day): MSJ 1750 (1349 x 1.3)  Weight Used for Protein Requirements: Current  Protein (g/day): 60-75g (0.81gPro/kg)  Method Used for Fluid Requirements: 1 ml/kcal  Fluid (ml/day): 1750mL    Nutrition Diagnosis:   Inadequate protein-energy intake related to altered GI function as evidenced by weight loss, poor intake prior to admission    Nutrition Interventions:   Food and/or Nutrient Delivery: Modify current diet  Nutrition Education/Counseling: No recommendations at this time  Coordination of Nutrition Care: Continue to monitor while inpatient, Interdisciplinary rounds       Goals:     Goals: PO intake 50% or greater, by next RD assessment (diet advancement)       Nutrition Monitoring and Evaluation:   Behavioral-Environmental Outcomes: None identified  Food/Nutrient Intake Outcomes: Diet advancement/tolerance, Food and nutrient intake  Physical Signs/Symptoms Outcomes: Biochemical data, Nutrition focused physical findings, Skin, Weight, GI status    Discharge Planning:     Too soon to determine    Susan Ramon RD, CNSC  Contact: ext [phone]

## 2022-09-22 NOTE — PROGRESS NOTES
[facility]    ICU TEAM Progress Note    Name: Porsche Martin   : 1965   MRN: [id number]   Date: 2022           ICU Assessment & Plan of Genaro Is a 60 y.o. female with HTN, HLD who is admitted for hyponatremia. PROBLEM LIST  Severe hyponatremia  Hypokalemia  H/o hypertension  H/o hyperlipidemia      NEURO  - no acute      CARDIAC  #. H/o HTN  #. H/o HLD  - cont home amlodipine if able to tolerate PO  - hold remaining antiHTNs  - prn hydral  - hold home statin given cramping and CK      RESPIRATORY  - no acute  - pulm hygiene, IS      RENAL  #. Severe hyponatremia  #. Severe hypokalemia  #. Hypophosphatemia, hypomagnesemia  - due to N/V + thiazide  - given 1L NS in ED, now on D5 + K correction  - goal Na correction 0.5 mEq/h and no more than 10-12/24h -- goal 124 tomorrow AM  - cont aggressive K repletion for now, which will correct Na  - concern for refeeding given hypoP in the setting of D5  - cont q4 labs, with low threshold to stop diet/D5 if phos cont's to drop  - consider B vitamin repletion    [place] Catheter Present: No  IVFs: D5      GASTROINTESTINAL  #. N/V  - likely viral gastroenteritis  - symptomatic tx: IVFs as above, zofran prn  - CLD for now      HEMATOLOGIC  #. Microcytosis, new  - nothing to do acutely, but may need further evaluation outpt      ID  #.  Leukocytosis   - fever curve uptrending in the setting of marked leukocytosis this morning  - reactive vs viral gastro, less likely bacterial enteritis  - UA neg  - procal + BCxs now  - hold on empiric abx pending cx data or clinical decline      ENDOCRINE  - BG at goal of 140-180      Mobility: Up with assistance  PT/OT:  not yet appropriate        DVT Prophylaxis: SCDs, enoxaparin  U - Ulcer Prophylaxis: not indicated  G - Glycemic Control: Insulin prn  B - Bowel Regimen: not indicated  Tubes: None  Lines: Peripheral IV  Drains: None    Subjective:   Progress Note: 2022      Reason for ICU Admission: hyponatremia     HPI: Per prior clinician: Ms. Van Caal is a 58-year-old  female with history of hypertension and hyperlipidemia. Last week she was started on thiazide diuretic and taken off of losartan. She developed intractable nausea and vomiting about 4 days ago and finally decided to come to the ER today because she could not keep anything down. In ER she was found to have sodium of 110. She is getting admitted to ICU for further care. She reports cramping in her legs. Her potassium was also 2.1. She denies chest pain, abdominal pain, diarrhea, fever, URI symptoms, change in bowel or urinary habits. Overnight Events:   9/22: started on D5 due to overcorrecting Na.          Current Meds:     Current Facility-Administered Medications   Medication Dose Route Frequency    potassium chloride 10 mEq in 100 ml IVPB  10 mEq IntraVENous Q1H    potassium phosphate 30 mmol in 0.9% sodium chloride 500 mL infusion   IntraVENous ONCE    dextrose 5% infusion  100 mL/hr IntraVENous CONTINUOUS    alcohol 62% (NOZIN) nasal  1 Ampule  1 Ampule Topical Q12H    ondansetron (ZOFRAN) injection 4 mg  4 mg IntraVENous Q6H PRN    atorvastatin (LIPITOR) tablet 20 mg  20 mg Oral DAILY    aspirin delayed-release tablet 81 mg  81 mg Oral DAILY    amLODIPine (NORVASC) tablet 5 mg  5 mg Oral DAILY    hydrALAZINE (APRESOLINE) 20 mg/mL injection 10 mg  10 mg IntraVENous Q4H PRN    sodium chloride (NS) flush 5-40 mL  5-40 mL IntraVENous Q8H    sodium chloride (NS) flush 5-40 mL  5-40 mL IntraVENous PRN    acetaminophen (TYLENOL) tablet 650 mg  650 mg Oral Q6H PRN    Or    acetaminophen (TYLENOL) suppository 650 mg  650 mg Rectal Q6H PRN    polyethylene glycol (MIRALAX) packet 17 g  17 g Oral DAILY PRN    ondansetron (ZOFRAN ODT) tablet 4 mg  4 mg Oral Q8H PRN    Or    ondansetron (ZOFRAN) injection 4 mg  4 mg IntraVENous Q6H PRN    enoxaparin (LOVENOX) injection 40 mg  40 mg SubCUTAneous DAILY       Objective: Vital Signs:  Visit Vitals  /83   Pulse 90   Temp 99.6 °F (37.6 °C)   Resp 18   Ht 5' 6\" (1.676 m)   Wt 75.2 kg (165 lb 12.6 oz)   SpO2 97%   Breastfeeding No   BMI 26.76 kg/m²      O2 Device: None (Room air) Temp (24hrs), Av.4 °F (36.9 °C), Min:97.7 °F (36.5 °C), Max:99.6 °F (37.6 °C)           Intake/Output:     Intake/Output Summary (Last 24 hours) at 2022 1270  Last data filed at 2022 0700  Gross per 24 hour   Intake 4676.67 ml   Output 2200 ml   Net 2476.67 ml       Physical Exam:  Awake, alert, sitting up in bed  Resps even and unlabored, symmetric chest rise  Reg rate, no heave/rub  Peripheral pulses intact  Abd soft, mildly diffusely tender  Skin warm, dry  MAEW  Calm and cooperative    LABS AND  DATA: Personally reviewed  MEDS: Reviewed    Multidisciplinary Rounds Completed:  Pending    SPECIAL EQUIPMENT  None    DISPOSITION  Stay in ICU    CRITICAL CARE CONSULTANT NOTE  I had a face to face encounter with the patient, reviewed and interpreted patient data including clinical events, labs, images, vital signs, I/O's, and examined patient. I have discussed the case and the plan and management of the patient's care with the consulting services, the bedside nurses and the respiratory therapist.      NOTE OF PERSONAL INVOLVEMENT IN CARE   This patient has a high probability of imminent, clinically significant deterioration, which requires the highest level of preparedness to intervene urgently. I participated in the decision-making and personally managed or directed the management of the following life and organ supporting interventions that required my frequent assessment to treat or prevent imminent deterioration. I personally spent 45 minutes of critical care time. This is time spent at this critically ill patient's bedside actively involved in patient care as well as the coordination of care. This does not include any procedural time which has been billed separately.     Jennifer Perkins Meghan Smith, [address] [place] [address]  9/22/2022

## 2022-09-22 NOTE — PROGRESS NOTES
1600 Report received from [facility]. Up in bed  in to visit. Offers no c/o pain or discomfort. Skin warm and dry to touch. Gait steady Up to bathroom minimal assistance voiding without difficulty. 1844  Dr. Angeline Butler informed. 1930 Report given to LÓPEZ Nichole RN via [employer] and [employer].

## 2022-09-22 NOTE — PROGRESS NOTES
1915: Bedside and Verbal shift change report given to Amina Cervantes RN (oncoming nurse) by Larry Fuentes RN (offgoing nurse). Report included the following information SBAR, Kardex, ED Summary, Procedure Summary, Intake/Output, MAR, Recent Results, and Cardiac Rhythm NSR .     2000: shift assessment complete. Pt is A+0x4, following commands, moves all extrem spontaneously pupils equal round and reactive. Pt is afebrile and denies pain. VSS, pt on room air, lung sounds diminished bilaterally. Pulses are palpable in all four extrem, no edema noted. See flow sheet for further details. 0000: reassessment complete; no changes noted. 0400: reassessment complete, no changes noted    0700: Bedside and Verbal shift change report given to Jyoti Mccullough RN (oncoming nurse) by Amina Cervantes RN (offgoing nurse). Report included the following information SBAR, Kardex, ED Summary, OR Summary, Procedure Summary, Intake/Output, MAR, Recent Results, and Cardiac Rhythm NSR .

## 2022-09-22 NOTE — PROGRESS NOTES
0700: Received report from 810 Hellen Bonilla RN.     0800: Assessed pt and pt is comfortable, ambulated to bathroom and back to bed.    1200: reassessed pt and no major changes.

## 2022-09-23 LAB
ANION GAP SERPL CALC-SCNC: 4 MMOL/L (ref 5–15)
ANION GAP SERPL CALC-SCNC: 4 MMOL/L (ref 5–15)
ANION GAP SERPL CALC-SCNC: 5 MMOL/L (ref 5–15)
ANION GAP SERPL CALC-SCNC: 5 MMOL/L (ref 5–15)
ANION GAP SERPL CALC-SCNC: 6 MMOL/L (ref 5–15)
ANION GAP SERPL CALC-SCNC: 7 MMOL/L (ref 5–15)
BUN SERPL-MCNC: 6 MG/DL (ref 6–20)
BUN SERPL-MCNC: 7 MG/DL (ref 6–20)
BUN SERPL-MCNC: 7 MG/DL (ref 6–20)
BUN SERPL-MCNC: 8 MG/DL (ref 6–20)
BUN/CREAT SERPL: 10 (ref 12–20)
BUN/CREAT SERPL: 11 (ref 12–20)
BUN/CREAT SERPL: 12 (ref 12–20)
BUN/CREAT SERPL: 8 (ref 12–20)
CALCIUM SERPL-MCNC: 7.9 MG/DL (ref 8.5–10.1)
CALCIUM SERPL-MCNC: 8.1 MG/DL (ref 8.5–10.1)
CALCIUM SERPL-MCNC: 8.2 MG/DL (ref 8.5–10.1)
CALCIUM SERPL-MCNC: 8.5 MG/DL (ref 8.5–10.1)
CALCIUM SERPL-MCNC: 8.6 MG/DL (ref 8.5–10.1)
CALCIUM SERPL-MCNC: 8.7 MG/DL (ref 8.5–10.1)
CHLORIDE SERPL-SCNC: 90 MMOL/L (ref 97–108)
CHLORIDE SERPL-SCNC: 90 MMOL/L (ref 97–108)
CHLORIDE SERPL-SCNC: 91 MMOL/L (ref 97–108)
CHLORIDE SERPL-SCNC: 92 MMOL/L (ref 97–108)
CO2 SERPL-SCNC: 25 MMOL/L (ref 21–32)
CO2 SERPL-SCNC: 28 MMOL/L (ref 21–32)
CO2 SERPL-SCNC: 29 MMOL/L (ref 21–32)
CREAT SERPL-MCNC: 0.66 MG/DL (ref 0.55–1.02)
CREAT SERPL-MCNC: 0.69 MG/DL (ref 0.55–1.02)
CREAT SERPL-MCNC: 0.73 MG/DL (ref 0.55–1.02)
CREAT SERPL-MCNC: 0.73 MG/DL (ref 0.55–1.02)
CREAT SERPL-MCNC: 0.79 MG/DL (ref 0.55–1.02)
CREAT SERPL-MCNC: 0.8 MG/DL (ref 0.55–1.02)
ERYTHROCYTE [DISTWIDTH] IN BLOOD BY AUTOMATED COUNT: 12.1 % (ref 11.5–14.5)
GLUCOSE SERPL-MCNC: 102 MG/DL (ref 65–100)
GLUCOSE SERPL-MCNC: 125 MG/DL (ref 65–100)
GLUCOSE SERPL-MCNC: 92 MG/DL (ref 65–100)
GLUCOSE SERPL-MCNC: 96 MG/DL (ref 65–100)
GLUCOSE SERPL-MCNC: 97 MG/DL (ref 65–100)
GLUCOSE SERPL-MCNC: 97 MG/DL (ref 65–100)
HCT VFR BLD AUTO: 35.3 % (ref 35–47)
HGB BLD-MCNC: 12.7 G/DL (ref 11.5–16)
MAGNESIUM SERPL-MCNC: 1.9 MG/DL (ref 1.6–2.4)
MAGNESIUM SERPL-MCNC: 2 MG/DL (ref 1.6–2.4)
MAGNESIUM SERPL-MCNC: 2.3 MG/DL (ref 1.6–2.4)
MAGNESIUM SERPL-MCNC: 2.3 MG/DL (ref 1.6–2.4)
MCH RBC QN AUTO: 29.1 PG (ref 26–34)
MCHC RBC AUTO-ENTMCNC: 36 G/DL (ref 30–36.5)
MCV RBC AUTO: 81 FL (ref 80–99)
NRBC # BLD: 0 K/UL (ref 0–0.01)
NRBC BLD-RTO: 0 PER 100 WBC
PHOSPHATE SERPL-MCNC: 1.7 MG/DL (ref 2.6–4.7)
PHOSPHATE SERPL-MCNC: 1.8 MG/DL (ref 2.6–4.7)
PHOSPHATE SERPL-MCNC: 2.4 MG/DL (ref 2.6–4.7)
PHOSPHATE SERPL-MCNC: 2.7 MG/DL (ref 2.6–4.7)
PHOSPHATE SERPL-MCNC: 2.8 MG/DL (ref 2.6–4.7)
PLATELET # BLD AUTO: 309 K/UL (ref 150–400)
PMV BLD AUTO: 9.5 FL (ref 8.9–12.9)
POTASSIUM SERPL-SCNC: 3.3 MMOL/L (ref 3.5–5.1)
POTASSIUM SERPL-SCNC: 3.6 MMOL/L (ref 3.5–5.1)
POTASSIUM SERPL-SCNC: 4 MMOL/L (ref 3.5–5.1)
POTASSIUM SERPL-SCNC: 4.8 MMOL/L (ref 3.5–5.1)
POTASSIUM SERPL-SCNC: 4.9 MMOL/L (ref 3.5–5.1)
POTASSIUM SERPL-SCNC: 5 MMOL/L (ref 3.5–5.1)
RBC # BLD AUTO: 4.36 M/UL (ref 3.8–5.2)
SODIUM SERPL-SCNC: 120 MMOL/L (ref 136–145)
SODIUM SERPL-SCNC: 123 MMOL/L (ref 136–145)
SODIUM SERPL-SCNC: 124 MMOL/L (ref 136–145)
SODIUM SERPL-SCNC: 125 MMOL/L (ref 136–145)
WBC # BLD AUTO: 8.3 K/UL (ref 3.6–11)

## 2022-09-23 PROCEDURE — 74011250637 HC RX REV CODE- 250/637: Performed by: NURSE PRACTITIONER

## 2022-09-23 PROCEDURE — 74011000250 HC RX REV CODE- 250: Performed by: HOSPITALIST

## 2022-09-23 PROCEDURE — 80048 BASIC METABOLIC PNL TOTAL CA: CPT

## 2022-09-23 PROCEDURE — 74011250637 HC RX REV CODE- 250/637: Performed by: HOSPITALIST

## 2022-09-23 PROCEDURE — 83735 ASSAY OF MAGNESIUM: CPT

## 2022-09-23 PROCEDURE — 36415 COLL VENOUS BLD VENIPUNCTURE: CPT

## 2022-09-23 PROCEDURE — 84100 ASSAY OF PHOSPHORUS: CPT

## 2022-09-23 PROCEDURE — 65270000046 HC RM TELEMETRY

## 2022-09-23 PROCEDURE — 74011250636 HC RX REV CODE- 250/636: Performed by: NURSE PRACTITIONER

## 2022-09-23 PROCEDURE — 85027 COMPLETE CBC AUTOMATED: CPT

## 2022-09-23 PROCEDURE — 74011250636 HC RX REV CODE- 250/636: Performed by: HOSPITALIST

## 2022-09-23 RX ORDER — SODIUM CHLORIDE 9 MG/ML
75 INJECTION, SOLUTION INTRAVENOUS CONTINUOUS
Status: DISCONTINUED | OUTPATIENT
Start: 2022-09-23 | End: 2022-09-24

## 2022-09-23 RX ORDER — HYDRALAZINE HYDROCHLORIDE 20 MG/ML
10 INJECTION INTRAMUSCULAR; INTRAVENOUS
Status: DISCONTINUED | OUTPATIENT
Start: 2022-09-23 | End: 2022-09-26 | Stop reason: HOSPADM

## 2022-09-23 RX ORDER — POTASSIUM CHLORIDE 20 MEQ/1
40 TABLET, EXTENDED RELEASE ORAL
Status: COMPLETED | OUTPATIENT
Start: 2022-09-23 | End: 2022-09-23

## 2022-09-23 RX ORDER — POTASSIUM CHLORIDE 20 MEQ/1
20 TABLET, EXTENDED RELEASE ORAL 2 TIMES DAILY
Status: DISCONTINUED | OUTPATIENT
Start: 2022-09-23 | End: 2022-09-24

## 2022-09-23 RX ORDER — AMLODIPINE BESYLATE 5 MG/1
10 TABLET ORAL DAILY
Status: DISCONTINUED | OUTPATIENT
Start: 2022-09-24 | End: 2022-09-26 | Stop reason: HOSPADM

## 2022-09-23 RX ADMIN — DIBASIC SODIUM PHOSPHATE, MONOBASIC POTASSIUM PHOSPHATE AND MONOBASIC SODIUM PHOSPHATE 2 TABLET: 852; 155; 130 TABLET ORAL at 08:07

## 2022-09-23 RX ADMIN — ASPIRIN 81 MG: 81 TABLET, COATED ORAL at 08:07

## 2022-09-23 RX ADMIN — Medication 1 AMPULE: at 20:26

## 2022-09-23 RX ADMIN — POTASSIUM CHLORIDE 40 MEQ: 20 TABLET, EXTENDED RELEASE ORAL at 18:28

## 2022-09-23 RX ADMIN — POTASSIUM CHLORIDE 20 MEQ: 20 TABLET, EXTENDED RELEASE ORAL at 21:07

## 2022-09-23 RX ADMIN — SODIUM CHLORIDE, PRESERVATIVE FREE 10 ML: 5 INJECTION INTRAVENOUS at 14:00

## 2022-09-23 RX ADMIN — DIBASIC SODIUM PHOSPHATE, MONOBASIC POTASSIUM PHOSPHATE AND MONOBASIC SODIUM PHOSPHATE 2 TABLET: 852; 155; 130 TABLET ORAL at 05:10

## 2022-09-23 RX ADMIN — SODIUM CHLORIDE 50 ML/HR: 9 INJECTION, SOLUTION INTRAVENOUS at 18:31

## 2022-09-23 RX ADMIN — SODIUM CHLORIDE, PRESERVATIVE FREE 20 ML: 5 INJECTION INTRAVENOUS at 21:07

## 2022-09-23 RX ADMIN — SODIUM CHLORIDE, PRESERVATIVE FREE 10 ML: 5 INJECTION INTRAVENOUS at 05:10

## 2022-09-23 RX ADMIN — Medication 1 AMPULE: at 09:00

## 2022-09-23 RX ADMIN — ENOXAPARIN SODIUM 40 MG: 100 INJECTION SUBCUTANEOUS at 08:07

## 2022-09-23 RX ADMIN — ACETAMINOPHEN 650 MG: 325 TABLET ORAL at 20:25

## 2022-09-23 NOTE — PROGRESS NOTES
Hospitalist Progress Note    Subjective:   Daily Progress Note: 9/23/2022 12:46 PM    Hospital Course:  Pt was admitted for nausea/vomiting, weakness,and headache. She reported having been seen by her PCP and BP medication was changed, and after 3 days of taking the medication started having nausea/vomiting at home. She started HCTZ and was taken off losartan, she is also taking amlodipine. Presented with hyponatremia, , and hypokalemia was admitted to the ICU for close observation and treatment. She was treated with IV fluids and electrolyte replacement. Subjective: PT seen in the ICU, states feels better, denies headache, no nausea or vomiting. Current Facility-Administered Medications   Medication Dose Route Frequency    alcohol 62% (NOZIN) nasal  1 Ampule  1 Ampule Topical Q12H    ondansetron (ZOFRAN) injection 4 mg  4 mg IntraVENous Q6H PRN    atorvastatin (LIPITOR) tablet 20 mg  20 mg Oral DAILY    aspirin delayed-release tablet 81 mg  81 mg Oral DAILY    amLODIPine (NORVASC) tablet 5 mg  5 mg Oral DAILY    hydrALAZINE (APRESOLINE) 20 mg/mL injection 10 mg  10 mg IntraVENous Q4H PRN    sodium chloride (NS) flush 5-40 mL  5-40 mL IntraVENous Q8H    sodium chloride (NS) flush 5-40 mL  5-40 mL IntraVENous PRN    acetaminophen (TYLENOL) tablet 650 mg  650 mg Oral Q6H PRN    Or    acetaminophen (TYLENOL) suppository 650 mg  650 mg Rectal Q6H PRN    polyethylene glycol (MIRALAX) packet 17 g  17 g Oral DAILY PRN    ondansetron (ZOFRAN ODT) tablet 4 mg  4 mg Oral Q8H PRN    Or    ondansetron (ZOFRAN) injection 4 mg  4 mg IntraVENous Q6H PRN    enoxaparin (LOVENOX) injection 40 mg  40 mg SubCUTAneous DAILY        Review of Systems:    Review of Systems   Constitutional:  Negative for fever and malaise/fatigue. Respiratory:  Negative for cough and shortness of breath. Cardiovascular:  Negative for chest pain and palpitations.    Gastrointestinal:  Negative for abdominal pain, diarrhea, heartburn, nausea and vomiting. Genitourinary:  Negative for dysuria and urgency. Musculoskeletal: Negative. Neurological:  Negative for dizziness and headaches. Objective:     Visit Vitals  /84 (BP 1 Location: Left upper arm, BP Patient Position: At rest)   Pulse 86   Temp 98.2 °F (36.8 °C)   Resp 14   Ht 5' 6\" (1.676 m)   Wt 75.2 kg (165 lb 12.6 oz)   SpO2 97%   Breastfeeding No   BMI 26.76 kg/m²      O2 Device: None (Room air)    Temp (24hrs), Av.2 °F (36.8 °C), Min:98 °F (36.7 °C), Max:98.7 °F (37.1 °C)      701 - 1900  In: 300 [P.O.:300]  Out: -   1901 -  0700  In: 6216.7 [P.O.:1170; I.V.:5046.7]  Out: 3700 [Urine:3700]    PHYSICAL EXAM:    Physical Exam  Constitutional:       General: She is not in acute distress. HENT:      Head: Normocephalic and atraumatic. Cardiovascular:      Rate and Rhythm: Normal rate and regular rhythm. Pulses: Normal pulses. Heart sounds: Normal heart sounds. Pulmonary:      Effort: Pulmonary effort is normal.      Breath sounds: Normal breath sounds. Abdominal:      General: Bowel sounds are normal.      Palpations: Abdomen is soft. Musculoskeletal:         General: Normal range of motion. Cervical back: Normal range of motion and neck supple. Skin:     General: Skin is warm and dry. Neurological:      Mental Status: She is oriented to person, place, and time.    Psychiatric:         Mood and Affect: Mood normal.         Behavior: Behavior normal.          Data Review    Recent Results (from the past 24 hour(s))   METABOLIC PANEL, BASIC    Collection Time: 22  4:52 PM   Result Value Ref Range    Sodium 120 (L) 136 - 145 mmol/L    Potassium 3.5 3.5 - 5.1 mmol/L    Chloride 85 (L) 97 - 108 mmol/L    CO2 27 21 - 32 mmol/L    Anion gap 8 5 - 15 mmol/L    Glucose 112 (H) 65 - 100 mg/dL    BUN 6 6 - 20 MG/DL    Creatinine 0.71 0.55 - 1.02 MG/DL    BUN/Creatinine ratio 8 (L) 12 - 20      GFR est AA >60 >60 ml/min/1.73m2    GFR est non-AA >60 >60 ml/min/1.73m2    Calcium 8.2 (L) 8.5 - 85.0 MG/DL   METABOLIC PANEL, BASIC    Collection Time: 09/22/22  8:32 PM   Result Value Ref Range    Sodium 120 (L) 136 - 145 mmol/L    Potassium 4.1 3.5 - 5.1 mmol/L    Chloride 87 (L) 97 - 108 mmol/L    CO2 25 21 - 32 mmol/L    Anion gap 8 5 - 15 mmol/L    Glucose 98 65 - 100 mg/dL    BUN 7 6 - 20 MG/DL    Creatinine 0.72 0.55 - 1.02 MG/DL    BUN/Creatinine ratio 10 (L) 12 - 20      GFR est AA >60 >60 ml/min/1.73m2    GFR est non-AA >60 >60 ml/min/1.73m2    Calcium 8.3 (L) 8.5 - 68.5 MG/DL   METABOLIC PANEL, BASIC    Collection Time: 09/23/22 12:25 AM   Result Value Ref Range    Sodium 120 (L) 136 - 145 mmol/L    Potassium 4.9 3.5 - 5.1 mmol/L    Chloride 90 (L) 97 - 108 mmol/L    CO2 25 21 - 32 mmol/L    Anion gap 5 5 - 15 mmol/L    Glucose 96 65 - 100 mg/dL    BUN 7 6 - 20 MG/DL    Creatinine 0.69 0.55 - 1.02 MG/DL    BUN/Creatinine ratio 10 (L) 12 - 20      GFR est AA >60 >60 ml/min/1.73m2    GFR est non-AA >60 >60 ml/min/1.73m2    Calcium 7.9 (L) 8.5 - 10.1 MG/DL   CBC W/O DIFF    Collection Time: 09/23/22  4:06 AM   Result Value Ref Range    WBC 8.3 3.6 - 11.0 K/uL    RBC 4.36 3.80 - 5.20 M/uL    HGB 12.7 11.5 - 16.0 g/dL    HCT 35.3 35.0 - 47.0 %    MCV 81.0 80.0 - 99.0 FL    MCH 29.1 26.0 - 34.0 PG    MCHC 36.0 30.0 - 36.5 g/dL    RDW 12.1 11.5 - 14.5 %    PLATELET 783 462 - 688 K/uL    MPV 9.5 8.9 - 12.9 FL    NRBC 0.0 0  WBC    ABSOLUTE NRBC 0.00 0.00 - 0.01 K/uL   PHOSPHORUS    Collection Time: 09/23/22  4:06 AM   Result Value Ref Range    Phosphorus 1.7 (L) 2.6 - 4.7 MG/DL   MAGNESIUM    Collection Time: 09/23/22  4:06 AM   Result Value Ref Range    Magnesium 2.3 1.6 - 2.4 mg/dL   METABOLIC PANEL, BASIC    Collection Time: 09/23/22  4:06 AM   Result Value Ref Range    Sodium 123 (L) 136 - 145 mmol/L    Potassium 4.8 3.5 - 5.1 mmol/L    Chloride 91 (L) 97 - 108 mmol/L    CO2 28 21 - 32 mmol/L    Anion gap 4 (L) 5 - 15 mmol/L    Glucose 97 65 - 100 mg/dL    BUN 6 6 - 20 MG/DL    Creatinine 0.79 0.55 - 1.02 MG/DL    BUN/Creatinine ratio 8 (L) 12 - 20      GFR est AA >60 >60 ml/min/1.73m2    GFR est non-AA >60 >60 ml/min/1.73m2    Calcium 8.7 8.5 - 10.1 MG/DL   PHOSPHORUS    Collection Time: 09/23/22  8:06 AM   Result Value Ref Range    Phosphorus 1.8 (L) 2.6 - 4.7 MG/DL   MAGNESIUM    Collection Time: 09/23/22  8:06 AM   Result Value Ref Range    Magnesium 2.3 1.6 - 2.4 mg/dL   METABOLIC PANEL, BASIC    Collection Time: 09/23/22  8:06 AM   Result Value Ref Range    Sodium 125 (L) 136 - 145 mmol/L    Potassium 5.0 3.5 - 5.1 mmol/L    Chloride 92 (L) 97 - 108 mmol/L    CO2 29 21 - 32 mmol/L    Anion gap 4 (L) 5 - 15 mmol/L    Glucose 97 65 - 100 mg/dL    BUN 7 6 - 20 MG/DL    Creatinine 0.73 0.55 - 1.02 MG/DL    BUN/Creatinine ratio 10 (L) 12 - 20      GFR est AA >60 >60 ml/min/1.73m2    GFR est non-AA >60 >60 ml/min/1.73m2    Calcium 8.6 8.5 - 10.1 MG/DL       No orders to display       Active Problems:    Hyponatremia (9/21/2022)        Assessment/Plan:   Hyponatremia/ electrolyte imbalances- ? R/t N/V,  , K 5.0 today, repeat bmp in am.     2. Nausea/vomiting- diet has been advanced, no further s/s    3. Hypertension- norvasc    4.  Discharge- anticipate next 24-48 hrs depending on clinical course            DVT Prophylaxis: lovenox  Code Status:  Full Code  POA: BELINDA Meza     Care Plan discussed with:   _________patient, staff nurse______________________________________________________    Dipti Metz NP

## 2022-09-23 NOTE — PROGRESS NOTES
0700: Bedside shift change report given to Feliz Barros (oncoming nurse) by Shakila Galvez RN (offgoing nurse). Report included the following information SBAR, Kardex, ED Summary, Intake/Output, MAR, Recent Results, Cardiac Rhythm NSR, and Alarm Parameters . 0800: AM assessment complete, see flowsheet for details. Pt is in bed, alert and oriented x4. S1S2 heart sounds, in NSR. BP stable. Lung sounds diminished, on room air. Bowel sounds active, soft nontender abdomen. Pulses palpable in all extremities, no edema. \\    0805: BMP drawn and sent to lab. 1200: reassessment complete, no significant changes. BMP drawn and sent to lab.     1600: reassessment complete, no significant changes.

## 2022-09-23 NOTE — PROGRESS NOTES
1900: Bedside and Verbal shift change report given to Murrell Najjar, RN (oncoming nurse) by Nasir Stevenson RN (offgoing nurse). Report included the following information SBAR, Kardex, Intake/Output, MAR, Recent Results, and Cardiac Rhythm NSR .     2000: shift assessment complete. Pt is A+O x4 afebrile and denies pain. VSS, pt on room air, lung sounds clear/diminished in the bases. Pulses are palpable in all four extremities no edema, skin is dry, warm and intact. See flow sheet for further details. 2025: pt c/o of dull headache pain 6/10. PRN tylenol given    2115: BMP drawn and sent to lab    2218: Perfect serve sent to Jasvir Thomson NP with updated BMP results. No new orders     0000: reassessment complete, no acute changes noted. 0015: BMP drawn and sent to the lab. 0138:  Perfect serve sent to Jasvir Thomson NP with updated BMP results    0142: orders received from Jasvir Thomson NP to increase NS IVF to 100mL/hr    0400: reassessment complete, no acute changes noted    0425: labs drawn    0514: Perfect serve sent to Jasvir Thomson NP with updated lab results    0700: Bedside and Verbal shift change report given to Nasir Stevenson RN (oncoming nurse) by Murrell Najjar, RN (offgoing nurse). Report included the following information SBAR, Kardex, Intake/Output, MAR, Recent Results, and Cardiac Rhythm NSR .

## 2022-09-23 NOTE — PROGRESS NOTES
[facility]    ICU TEAM Progress Note    Name: Porsche Martin   : 1965   MRN: [id number]   Date: 2022           ICU Assessment & Plan of Genaro Is a 60 y.o. female with HTN, HLD who is admitted for hyponatremia. PROBLEM LIST  Severe hyponatremia, improving  Hypokalemia  H/o hypertension  H/o hyperlipidemia      NEURO  - no acute      CARDIAC  #. H/o HTN  #. H/o HLD  - hold home amlodipine given soft BP  - prn hydral  - hold home statin given cramping and CK, recheck at discharge      RESPIRATORY  - no acute  - pulm hygiene, IS      RENAL  #. Severe hyponatremia, improving  #. Severe hypokalemia, resolved  #. Hypophosphatemia, hypomagnesemia  - due to N/V + thiazide  - given 1L NS in ED, now on D5 + K correction  - goal Na correction 0.5 mEq/h and no more than 10-12/24h -- goal 132 tomorrow AM  - concern for refeeding given hypoP in the setting of D5  - cont q4 labs, with low threshold to stop diet/D5 if phos cont's to drop  - consider B vitamin repletion    [place] Catheter Present: No  IVFs: D5 prn      GASTROINTESTINAL  #. N/V  - likely viral gastroenteritis  - symptomatic tx: IVFs as above, zofran prn  - ADAT      HEMATOLOGIC  #. Microcytosis, new  - nothing to do acutely, but may need further evaluation outpt      ID  #.  Leukocytosis   - fever curve resolved, WBC normalized  - reactive vs viral gastro, less likely bacterial enteritis  - UA neg  - procal neg, BCxs in prog   - hold on empiric abx pending cx data or clinical decline      ENDOCRINE  - BG at goal of 140-180      Mobility: Up with assistance  PT/OT:  not yet appropriate        DVT Prophylaxis: SCDs, enoxaparin  U - Ulcer Prophylaxis: not indicated  G - Glycemic Control: Insulin prn  B - Bowel Regimen: not indicated  Tubes: None  Lines: Peripheral IV  Drains: None    Subjective:   Progress Note: 2022      Reason for ICU Admission: hyponatremia     HPI: Per prior clinician: Ms. Ne Vieira is a 66-year-old  female with history of hypertension and hyperlipidemia. Last week she was started on thiazide diuretic and taken off of losartan. She developed intractable nausea and vomiting about 4 days ago and finally decided to come to the ER today because she could not keep anything down. In ER she was found to have sodium of 110. She is getting admitted to ICU for further care. She reports cramping in her legs. Her potassium was also 2.1. She denies chest pain, abdominal pain, diarrhea, fever, URI symptoms, change in bowel or urinary habits. Overnight Events:   9/22: started on D5 due to overcorrecting Na.   9/23: Na at goal this morning. Electrolytes repleting. N/V improved.         Current Meds:     Current Facility-Administered Medications   Medication Dose Route Frequency    phosphorus (K PHOS NEUTRAL) 250 mg tablet 2 Tablet  2 Tablet Oral BID    [Held by provider] dextrose 5% infusion  50 mL/hr IntraVENous CONTINUOUS    alcohol 62% (NOZIN) nasal  1 Ampule  1 Ampule Topical Q12H    ondansetron (ZOFRAN) injection 4 mg  4 mg IntraVENous Q6H PRN    [Held by provider] atorvastatin (LIPITOR) tablet 20 mg  20 mg Oral DAILY    aspirin delayed-release tablet 81 mg  81 mg Oral DAILY    amLODIPine (NORVASC) tablet 5 mg  5 mg Oral DAILY    hydrALAZINE (APRESOLINE) 20 mg/mL injection 10 mg  10 mg IntraVENous Q4H PRN    sodium chloride (NS) flush 5-40 mL  5-40 mL IntraVENous Q8H    sodium chloride (NS) flush 5-40 mL  5-40 mL IntraVENous PRN    acetaminophen (TYLENOL) tablet 650 mg  650 mg Oral Q6H PRN    Or    acetaminophen (TYLENOL) suppository 650 mg  650 mg Rectal Q6H PRN    polyethylene glycol (MIRALAX) packet 17 g  17 g Oral DAILY PRN    ondansetron (ZOFRAN ODT) tablet 4 mg  4 mg Oral Q8H PRN    Or    ondansetron (ZOFRAN) injection 4 mg  4 mg IntraVENous Q6H PRN    enoxaparin (LOVENOX) injection 40 mg  40 mg SubCUTAneous DAILY       Objective:   Vital Signs:  Visit Vitals  /69   Pulse 76   Temp 98 °F (36.7 °C)   Resp 15   Ht 5' 6\" (1.676 m)   Wt 75.2 kg (165 lb 12.6 oz)   SpO2 96%   Breastfeeding No   BMI 26.76 kg/m²      O2 Device: None (Room air) Temp (24hrs), Av.4 °F (36.9 °C), Min:98 °F (36.7 °C), Max:98.9 °F (37.2 °C)           Intake/Output:     Intake/Output Summary (Last 24 hours) at 2022 1763  Last data filed at 2022 0403  Gross per 24 hour   Intake 2245 ml   Output 2300 ml   Net -55 ml         Physical Exam:  Awake, alert, walking around room  Resps even and unlabored, symmetric chest rise  Reg rate, no heave/rub  Abd nondistended  Skin warm, dry  MAEW  Calm and cooperative    LABS AND  DATA: Personally reviewed  MEDS: Reviewed    Multidisciplinary Rounds Completed:  Pending    SPECIAL EQUIPMENT  None    DISPOSITION  Stay in ICU    CRITICAL CARE CONSULTANT NOTE  I had a face to face encounter with the patient, reviewed and interpreted patient data including clinical events, labs, images, vital signs, I/O's, and examined patient. I have discussed the case and the plan and management of the patient's care with the consulting services, the bedside nurses and the respiratory therapist.      NOTE OF PERSONAL INVOLVEMENT IN CARE   This patient has a high probability of imminent, clinically significant deterioration, which requires the highest level of preparedness to intervene urgently. I participated in the decision-making and personally managed or directed the management of the following life and organ supporting interventions that required my frequent assessment to treat or prevent imminent deterioration. I personally spent 40 minutes of critical care time. This is time spent at this critically ill patient's bedside actively involved in patient care as well as the coordination of care. This does not include any procedural time which has been billed separately.     Yvrose Miguel MD  Intensivist  2022

## 2022-09-24 LAB
ALBUMIN SERPL-MCNC: 3.1 G/DL (ref 3.5–5)
ALBUMIN/GLOB SERPL: 0.9 {RATIO} (ref 1.1–2.2)
ALP SERPL-CCNC: 85 U/L (ref 45–117)
ALT SERPL-CCNC: 39 U/L (ref 12–78)
ANION GAP SERPL CALC-SCNC: 4 MMOL/L (ref 5–15)
ANION GAP SERPL CALC-SCNC: 5 MMOL/L (ref 5–15)
ANION GAP SERPL CALC-SCNC: 6 MMOL/L (ref 5–15)
AST SERPL-CCNC: 42 U/L (ref 15–37)
BILIRUB DIRECT SERPL-MCNC: 0.1 MG/DL (ref 0–0.2)
BILIRUB SERPL-MCNC: 0.3 MG/DL (ref 0.2–1)
BUN SERPL-MCNC: 6 MG/DL (ref 6–20)
BUN SERPL-MCNC: 6 MG/DL (ref 6–20)
BUN SERPL-MCNC: 7 MG/DL (ref 6–20)
BUN/CREAT SERPL: 11 (ref 12–20)
BUN/CREAT SERPL: 9 (ref 12–20)
BUN/CREAT SERPL: 9 (ref 12–20)
CALCIUM SERPL-MCNC: 7.9 MG/DL (ref 8.5–10.1)
CALCIUM SERPL-MCNC: 8.3 MG/DL (ref 8.5–10.1)
CALCIUM SERPL-MCNC: 8.5 MG/DL (ref 8.5–10.1)
CHLORIDE SERPL-SCNC: 96 MMOL/L (ref 97–108)
CHLORIDE SERPL-SCNC: 97 MMOL/L (ref 97–108)
CHLORIDE SERPL-SCNC: 97 MMOL/L (ref 97–108)
CO2 SERPL-SCNC: 26 MMOL/L (ref 21–32)
CO2 SERPL-SCNC: 26 MMOL/L (ref 21–32)
CO2 SERPL-SCNC: 27 MMOL/L (ref 21–32)
CREAT SERPL-MCNC: 0.65 MG/DL (ref 0.55–1.02)
CREAT SERPL-MCNC: 0.66 MG/DL (ref 0.55–1.02)
CREAT SERPL-MCNC: 0.7 MG/DL (ref 0.55–1.02)
ERYTHROCYTE [DISTWIDTH] IN BLOOD BY AUTOMATED COUNT: 12.1 % (ref 11.5–14.5)
GLOBULIN SER CALC-MCNC: 3.5 G/DL (ref 2–4)
GLUCOSE SERPL-MCNC: 90 MG/DL (ref 65–100)
GLUCOSE SERPL-MCNC: 91 MG/DL (ref 65–100)
GLUCOSE SERPL-MCNC: 93 MG/DL (ref 65–100)
HCT VFR BLD AUTO: 31.7 % (ref 35–47)
HGB BLD-MCNC: 11.4 G/DL (ref 11.5–16)
MAGNESIUM SERPL-MCNC: 1.9 MG/DL (ref 1.6–2.4)
MCH RBC QN AUTO: 29.5 PG (ref 26–34)
MCHC RBC AUTO-ENTMCNC: 36 G/DL (ref 30–36.5)
MCV RBC AUTO: 82.1 FL (ref 80–99)
NRBC # BLD: 0 K/UL (ref 0–0.01)
NRBC BLD-RTO: 0 PER 100 WBC
PHOSPHATE SERPL-MCNC: 2.2 MG/DL (ref 2.6–4.7)
PHOSPHATE SERPL-MCNC: 2.4 MG/DL (ref 2.6–4.7)
PHOSPHATE SERPL-MCNC: 3.5 MG/DL (ref 2.6–4.7)
PLATELET # BLD AUTO: 257 K/UL (ref 150–400)
PMV BLD AUTO: 9.7 FL (ref 8.9–12.9)
POTASSIUM SERPL-SCNC: 4 MMOL/L (ref 3.5–5.1)
POTASSIUM SERPL-SCNC: 4.1 MMOL/L (ref 3.5–5.1)
POTASSIUM SERPL-SCNC: 4.5 MMOL/L (ref 3.5–5.1)
PROT SERPL-MCNC: 6.6 G/DL (ref 6.4–8.2)
RBC # BLD AUTO: 3.86 M/UL (ref 3.8–5.2)
SODIUM SERPL-SCNC: 126 MMOL/L (ref 136–145)
SODIUM SERPL-SCNC: 129 MMOL/L (ref 136–145)
SODIUM SERPL-SCNC: 129 MMOL/L (ref 136–145)
SODIUM SERPL-SCNC: 130 MMOL/L (ref 136–145)
WBC # BLD AUTO: 5.9 K/UL (ref 3.6–11)

## 2022-09-24 PROCEDURE — 65270000029 HC RM PRIVATE

## 2022-09-24 PROCEDURE — 80076 HEPATIC FUNCTION PANEL: CPT

## 2022-09-24 PROCEDURE — 74011000250 HC RX REV CODE- 250: Performed by: NURSE PRACTITIONER

## 2022-09-24 PROCEDURE — 74011250636 HC RX REV CODE- 250/636: Performed by: NURSE PRACTITIONER

## 2022-09-24 PROCEDURE — 84100 ASSAY OF PHOSPHORUS: CPT

## 2022-09-24 PROCEDURE — 74011250636 HC RX REV CODE- 250/636: Performed by: HOSPITALIST

## 2022-09-24 PROCEDURE — 80048 BASIC METABOLIC PNL TOTAL CA: CPT

## 2022-09-24 PROCEDURE — 85027 COMPLETE CBC AUTOMATED: CPT

## 2022-09-24 PROCEDURE — 74011000250 HC RX REV CODE- 250: Performed by: HOSPITALIST

## 2022-09-24 PROCEDURE — 83735 ASSAY OF MAGNESIUM: CPT

## 2022-09-24 PROCEDURE — 36415 COLL VENOUS BLD VENIPUNCTURE: CPT

## 2022-09-24 PROCEDURE — 74011250637 HC RX REV CODE- 250/637: Performed by: NURSE PRACTITIONER

## 2022-09-24 PROCEDURE — 84295 ASSAY OF SERUM SODIUM: CPT

## 2022-09-24 PROCEDURE — 74011250637 HC RX REV CODE- 250/637: Performed by: HOSPITALIST

## 2022-09-24 RX ADMIN — SODIUM CHLORIDE, PRESERVATIVE FREE 20 ML: 5 INJECTION INTRAVENOUS at 05:31

## 2022-09-24 RX ADMIN — POTASSIUM PHOSPHATE, MONOBASIC AND POTASSIUM PHOSPHATE, DIBASIC: 224; 236 INJECTION, SOLUTION, CONCENTRATE INTRAVENOUS at 06:28

## 2022-09-24 RX ADMIN — AMLODIPINE BESYLATE 10 MG: 5 TABLET ORAL at 08:20

## 2022-09-24 RX ADMIN — POTASSIUM CHLORIDE 20 MEQ: 20 TABLET, EXTENDED RELEASE ORAL at 08:20

## 2022-09-24 RX ADMIN — ATORVASTATIN CALCIUM 20 MG: 20 TABLET, FILM COATED ORAL at 08:20

## 2022-09-24 RX ADMIN — SODIUM CHLORIDE 75 ML/HR: 9 INJECTION, SOLUTION INTRAVENOUS at 10:40

## 2022-09-24 RX ADMIN — ENOXAPARIN SODIUM 40 MG: 100 INJECTION SUBCUTANEOUS at 08:20

## 2022-09-24 RX ADMIN — Medication 1 AMPULE: at 09:00

## 2022-09-24 RX ADMIN — SODIUM CHLORIDE, PRESERVATIVE FREE 10 ML: 5 INJECTION INTRAVENOUS at 18:45

## 2022-09-24 RX ADMIN — ASPIRIN 81 MG: 81 TABLET, COATED ORAL at 08:20

## 2022-09-24 NOTE — PROGRESS NOTES
Bedside shift change report given to Andreas N Carson Garcia (oncoming nurse) by Mariah Dickerson LPN (offgoing nurse). Report included the following information SBAR, Kardex, Intake/Output, and MAR.

## 2022-09-24 NOTE — PROGRESS NOTES
0700: Bedside shift change report given to Feliz Barros (oncoming nurse) by Gilford Reason RN (offgoing nurse). Report included the following information SBAR, Kardex, ED Summary, Intake/Output, MAR, Recent Results, Cardiac Rhythm NSR, and Alarm Parameters . 0800: AM assessment complete, see flowsheet for details. Pt is in bed, alert and oriented x4. Up ad александр. S1S2 heart sounds, in NSR. BP stable. Lung sounds diminished on room air. Active bowel sounds, soft nontender abdomen. Pulses palpable in all extremities, no edema. 1200: reassessment complete, no significant changes. 1600: TRANSFER - OUT REPORT:    Verbal report given to 19 Haven Stanford RN(name) on Rodriguez Stamp  being transferred to [facility] (unit) for routine progression of care       Report consisted of patients Situation, Background, Assessment and   Recommendations(SBAR). Information from the following report(s) SBAR, Kardex, ED Summary, Intake/Output, MAR, Recent Results, Cardiac Rhythm NSR, and Alarm Parameters  was reviewed with the receiving nurse. Lines:   Peripheral IV 09/21/22 Right Antecubital (Active)   Site Assessment Clean, dry, & intact 09/24/22 1200   Phlebitis Assessment 0 09/24/22 1200   Infiltration Assessment 0 09/24/22 1200   Dressing Status Clean, dry, & intact 09/24/22 1200   Dressing Type Tape;Transparent 09/24/22 1200   Hub Color/Line Status Capped;Flushed;Pink 09/24/22 1200   Action Taken Open ports on tubing capped 09/24/22 1200   Alcohol Cap Used Yes 09/24/22 1200       Peripheral IV 85/46/32 Right Cephalic (Active)   Site Assessment Clean, dry, & intact 09/24/22 1200   Phlebitis Assessment 0 09/24/22 1200   Infiltration Assessment 0 09/24/22 1200   Dressing Status Clean, dry, & intact 09/24/22 1200   Dressing Type Tape;Transparent 09/24/22 1200   Hub Color/Line Status Green; Infusing;Flushed 09/24/22 1200   Action Taken Blood drawn 09/24/22 1200   Alcohol Cap Used Yes 09/24/22 1200       Saline Lock 09/21/22 Left;Posterior Hand (Active)   Site Assessment Clean, dry, & intact 09/24/22 1200   Phlebitis Assessment 0 09/24/22 1200   Infiltration Assessment 0 09/24/22 1200   Dressing Status Clean, dry, & intact 09/24/22 1200   Dressing Type Tape;Transparent 09/24/22 1200   Hub Color/Line Status Pink;Capped 09/24/22 1200   Action Taken Open ports on tubing capped 09/24/22 1200        Opportunity for questions and clarification was provided.       Patient transported with:   Monitor  Registered Nurse

## 2022-09-24 NOTE — PROGRESS NOTES
Received notification from bedside RN about patient with regards to: Na 126   VS: /80, HR 85, RR 16, O2 sat 97% on RA    Intervention given: Increase IVF rate to 100 ml/hour    0522: Notified of Na 129, Phos 2.4  VS: /81, HR 77, RR 15, O2 sat 97% on RA    - Kphos 20 mmol IV x 1 dose, decrease IVF rate to 75ml/hour ordered

## 2022-09-24 NOTE — PROGRESS NOTES
Hospitalist Progress Note    Subjective:   Daily Progress Note: 9/24/2022 12:46 PM    Hospital Course:  Pt was admitted for nausea/vomiting, weakness,and headache. She reported having been seen by her PCP and BP medication was changed, and after 3 days of taking the medication started having nausea/vomiting at home. She started HCTZ and was taken off losartan, she is also taking amlodipine. Presented with hyponatremia, , and hypokalemia was admitted to the ICU for close observation and treatment. She was treated with IV fluids and electrolyte replacement. Subjective: Patient seen in the ICU, no events overnight    Current Facility-Administered Medications   Medication Dose Route Frequency    potassium chloride (K-DUR, KLOR-CON M20) SR tablet 20 mEq  20 mEq Oral BID    hydrALAZINE (APRESOLINE) 20 mg/mL injection 10 mg  10 mg IntraVENous Q6H PRN    amLODIPine (NORVASC) tablet 10 mg  10 mg Oral DAILY    alcohol 62% (NOZIN) nasal  1 Ampule  1 Ampule Topical Q12H    ondansetron (ZOFRAN) injection 4 mg  4 mg IntraVENous Q6H PRN    atorvastatin (LIPITOR) tablet 20 mg  20 mg Oral DAILY    aspirin delayed-release tablet 81 mg  81 mg Oral DAILY    sodium chloride (NS) flush 5-40 mL  5-40 mL IntraVENous Q8H    sodium chloride (NS) flush 5-40 mL  5-40 mL IntraVENous PRN    acetaminophen (TYLENOL) tablet 650 mg  650 mg Oral Q6H PRN    Or    acetaminophen (TYLENOL) suppository 650 mg  650 mg Rectal Q6H PRN    polyethylene glycol (MIRALAX) packet 17 g  17 g Oral DAILY PRN    ondansetron (ZOFRAN ODT) tablet 4 mg  4 mg Oral Q8H PRN    Or    ondansetron (ZOFRAN) injection 4 mg  4 mg IntraVENous Q6H PRN    enoxaparin (LOVENOX) injection 40 mg  40 mg SubCUTAneous DAILY        Review of Systems:    Review of Systems   Constitutional:  Negative for fever and malaise/fatigue. Respiratory:  Negative for cough and shortness of breath. Cardiovascular:  Negative for chest pain and palpitations.    Gastrointestinal: Negative for abdominal pain, diarrhea, heartburn, nausea and vomiting. Genitourinary:  Negative for dysuria and urgency. Musculoskeletal: Negative. Neurological:  Negative for dizziness and headaches. Objective:     Visit Vitals  /79 (BP 1 Location: Right upper arm, BP Patient Position: At rest)   Pulse 88   Temp 98.1 °F (36.7 °C)   Resp 25   Ht 5' 6\" (1.676 m)   Wt 75.2 kg (165 lb 12.6 oz)   SpO2 98%   Breastfeeding No   BMI 26.76 kg/m²      O2 Device: None (Room air)    Temp (24hrs), Av °F (36.7 °C), Min:97.9 °F (36.6 °C), Max:98.3 °F (36.8 °C)      701 -  1900  In: 1626.3 [P.O.:900; I.V.:726.3]  Out: -   1901 -  0700  In: 1757.5 [P.O.:600; I.V.:1157.5]  Out: 700 [Urine:700]    PHYSICAL EXAM:    Physical Exam  Constitutional:       General: She is not in acute distress. HENT:      Head: Normocephalic and atraumatic. Cardiovascular:      Rate and Rhythm: Normal rate and regular rhythm. Pulses: Normal pulses. Heart sounds: Normal heart sounds. Pulmonary:      Effort: Pulmonary effort is normal.      Breath sounds: Normal breath sounds. Abdominal:      General: Bowel sounds are normal.      Palpations: Abdomen is soft. Musculoskeletal:         General: Normal range of motion. Cervical back: Normal range of motion and neck supple. Skin:     General: Skin is warm and dry. Neurological:      Mental Status: She is oriented to person, place, and time.    Psychiatric:         Mood and Affect: Mood normal.         Behavior: Behavior normal.          Data Review    Recent Results (from the past 24 hour(s))   PHOSPHORUS    Collection Time: 22  4:59 PM   Result Value Ref Range    Phosphorus 2.7 2.6 - 4.7 MG/DL   MAGNESIUM    Collection Time: 22  4:59 PM   Result Value Ref Range    Magnesium 1.9 1.6 - 2.4 mg/dL   METABOLIC PANEL, BASIC    Collection Time: 22  4:59 PM   Result Value Ref Range    Sodium 125 (L) 136 - 145 mmol/L    Potassium 3.3 (L) 3.5 - 5.1 mmol/L    Chloride 90 (L) 97 - 108 mmol/L    CO2 28 21 - 32 mmol/L    Anion gap 7 5 - 15 mmol/L    Glucose 125 (H) 65 - 100 mg/dL    BUN 8 6 - 20 MG/DL    Creatinine 0.66 0.55 - 1.02 MG/DL    BUN/Creatinine ratio 12 12 - 20      GFR est AA >60 >60 ml/min/1.73m2    GFR est non-AA >60 >60 ml/min/1.73m2    Calcium 8.2 (L) 8.5 - 10.1 MG/DL   PHOSPHORUS    Collection Time: 09/23/22  9:15 PM   Result Value Ref Range    Phosphorus 2.8 2.6 - 4.7 MG/DL   METABOLIC PANEL, BASIC    Collection Time: 09/23/22  9:15 PM   Result Value Ref Range    Sodium 125 (L) 136 - 145 mmol/L    Potassium 4.0 3.5 - 5.1 mmol/L    Chloride 91 (L) 97 - 108 mmol/L    CO2 28 21 - 32 mmol/L    Anion gap 6 5 - 15 mmol/L    Glucose 92 65 - 100 mg/dL    BUN 8 6 - 20 MG/DL    Creatinine 0.80 0.55 - 1.02 MG/DL    BUN/Creatinine ratio 10 (L) 12 - 20      GFR est AA >60 >60 ml/min/1.73m2    GFR est non-AA >60 >60 ml/min/1.73m2    Calcium 8.5 8.5 - 10.1 MG/DL   PHOSPHORUS    Collection Time: 09/24/22 12:15 AM   Result Value Ref Range    Phosphorus 2.2 (L) 2.6 - 4.7 MG/DL   METABOLIC PANEL, BASIC    Collection Time: 09/24/22 12:15 AM   Result Value Ref Range    Sodium 126 (L) 136 - 145 mmol/L    Potassium 4.1 3.5 - 5.1 mmol/L    Chloride 96 (L) 97 - 108 mmol/L    CO2 26 21 - 32 mmol/L    Anion gap 4 (L) 5 - 15 mmol/L    Glucose 90 65 - 100 mg/dL    BUN 7 6 - 20 MG/DL    Creatinine 0.65 0.55 - 1.02 MG/DL    BUN/Creatinine ratio 11 (L) 12 - 20      GFR est AA >60 >60 ml/min/1.73m2    GFR est non-AA >60 >60 ml/min/1.73m2    Calcium 7.9 (L) 8.5 - 10.1 MG/DL   PHOSPHORUS    Collection Time: 09/24/22  4:25 AM   Result Value Ref Range    Phosphorus 2.4 (L) 2.6 - 4.7 MG/DL   METABOLIC PANEL, BASIC    Collection Time: 09/24/22  4:25 AM   Result Value Ref Range    Sodium 129 (L) 136 - 145 mmol/L    Potassium 4.0 3.5 - 5.1 mmol/L    Chloride 97 97 - 108 mmol/L    CO2 27 21 - 32 mmol/L    Anion gap 5 5 - 15 mmol/L    Glucose 91 65 - 100 mg/dL    BUN 6 6 - 20 MG/DL    Creatinine 0.66 0.55 - 1.02 MG/DL    BUN/Creatinine ratio 9 (L) 12 - 20      GFR est AA >60 >60 ml/min/1.73m2    GFR est non-AA >60 >60 ml/min/1.73m2    Calcium 8.3 (L) 8.5 - 10.1 MG/DL   CBC W/O DIFF    Collection Time: 09/24/22  4:25 AM   Result Value Ref Range    WBC 5.9 3.6 - 11.0 K/uL    RBC 3.86 3.80 - 5.20 M/uL    HGB 11.4 (L) 11.5 - 16.0 g/dL    HCT 31.7 (L) 35.0 - 47.0 %    MCV 82.1 80.0 - 99.0 FL    MCH 29.5 26.0 - 34.0 PG    MCHC 36.0 30.0 - 36.5 g/dL    RDW 12.1 11.5 - 14.5 %    PLATELET 136 448 - 253 K/uL    MPV 9.7 8.9 - 12.9 FL    NRBC 0.0 0  WBC    ABSOLUTE NRBC 0.00 0.00 - 0.01 K/uL   HEPATIC FUNCTION PANEL    Collection Time: 09/24/22  4:25 AM   Result Value Ref Range    Protein, total 6.6 6.4 - 8.2 g/dL    Albumin 3.1 (L) 3.5 - 5.0 g/dL    Globulin 3.5 2.0 - 4.0 g/dL    A-G Ratio 0.9 (L) 1.1 - 2.2      Bilirubin, total 0.3 0.2 - 1.0 MG/DL    Bilirubin, direct 0.1 0.0 - 0.2 MG/DL    Alk.  phosphatase 85 45 - 117 U/L    AST (SGOT) 42 (H) 15 - 37 U/L    ALT (SGPT) 39 12 - 78 U/L   MAGNESIUM    Collection Time: 09/24/22  4:25 AM   Result Value Ref Range    Magnesium 1.9 1.6 - 2.4 mg/dL   PHOSPHORUS    Collection Time: 09/24/22 12:01 PM   Result Value Ref Range    Phosphorus 3.5 2.6 - 4.7 MG/DL   METABOLIC PANEL, BASIC    Collection Time: 09/24/22 12:01 PM   Result Value Ref Range    Sodium 129 (L) 136 - 145 mmol/L    Potassium 4.5 3.5 - 5.1 mmol/L    Chloride 97 97 - 108 mmol/L    CO2 26 21 - 32 mmol/L    Anion gap 6 5 - 15 mmol/L    Glucose 93 65 - 100 mg/dL    BUN 6 6 - 20 MG/DL    Creatinine 0.70 0.55 - 1.02 MG/DL    BUN/Creatinine ratio 9 (L) 12 - 20      GFR est AA >60 >60 ml/min/1.73m2    GFR est non-AA >60 >60 ml/min/1.73m2    Calcium 8.5 8.5 - 10.1 MG/DL       No orders to display       Active Problems:    Hyponatremia (9/21/2022)      Assessment/Plan:   Severe hyponatremia POA   Severe hypokalemia , K2.1 on admission     -Suspected from volume depletion /chlorthalidone/nausea vomiting   -Presented with sodium of 110, K2.1   -Admitted to ICU on 9/21 , sodium improving slowly at goal rate (6-8 increasing 24 hours )with IVF  -Awaiting transfer out of ICU  -Sodium 129 today, patient reports good p.o. intake, DC IVF  -Check TSH      2. Nausea/vomiting- diet has been advanced, no further s/s    3. Hypertension- norvasc    4.  Discharge- anticipate next 24-48 hrs depending on clinical course            DVT Prophylaxis: lovenox  Code Status:  Full Code  POA: BELINDA Tucker Jennifer     Care Plan discussed with:   _________patient, staff nurse______________________________________________________    Ford Guzmán MD

## 2022-09-25 LAB
ALBUMIN SERPL-MCNC: 4 G/DL (ref 3.5–5)
ANION GAP SERPL CALC-SCNC: 8 MMOL/L (ref 5–15)
BUN SERPL-MCNC: 7 MG/DL (ref 6–20)
BUN/CREAT SERPL: 9 (ref 12–20)
CALCIUM SERPL-MCNC: 9.2 MG/DL (ref 8.5–10.1)
CHLORIDE SERPL-SCNC: 92 MMOL/L (ref 97–108)
CO2 SERPL-SCNC: 28 MMOL/L (ref 21–32)
CREAT SERPL-MCNC: 0.78 MG/DL (ref 0.55–1.02)
GLUCOSE SERPL-MCNC: 90 MG/DL (ref 65–100)
PHOSPHATE SERPL-MCNC: 3.6 MG/DL (ref 2.6–4.7)
POTASSIUM SERPL-SCNC: 4.1 MMOL/L (ref 3.5–5.1)
SODIUM SERPL-SCNC: 127 MMOL/L (ref 136–145)
SODIUM SERPL-SCNC: 128 MMOL/L (ref 136–145)
TSH SERPL DL<=0.05 MIU/L-ACNC: 2.13 UIU/ML (ref 0.36–3.74)

## 2022-09-25 PROCEDURE — 74011250636 HC RX REV CODE- 250/636: Performed by: NURSE PRACTITIONER

## 2022-09-25 PROCEDURE — 74011250637 HC RX REV CODE- 250/637: Performed by: NURSE PRACTITIONER

## 2022-09-25 PROCEDURE — 74011000250 HC RX REV CODE- 250: Performed by: NURSE PRACTITIONER

## 2022-09-25 PROCEDURE — 84443 ASSAY THYROID STIM HORMONE: CPT

## 2022-09-25 PROCEDURE — 65270000029 HC RM PRIVATE

## 2022-09-25 PROCEDURE — 80069 RENAL FUNCTION PANEL: CPT

## 2022-09-25 PROCEDURE — 36415 COLL VENOUS BLD VENIPUNCTURE: CPT

## 2022-09-25 PROCEDURE — 84295 ASSAY OF SERUM SODIUM: CPT

## 2022-09-25 RX ADMIN — Medication 1 AMPULE: at 00:30

## 2022-09-25 RX ADMIN — SODIUM CHLORIDE, PRESERVATIVE FREE 10 ML: 5 INJECTION INTRAVENOUS at 07:47

## 2022-09-25 RX ADMIN — ASPIRIN 81 MG: 81 TABLET, COATED ORAL at 08:35

## 2022-09-25 RX ADMIN — SODIUM CHLORIDE, PRESERVATIVE FREE 10 ML: 5 INJECTION INTRAVENOUS at 00:31

## 2022-09-25 RX ADMIN — ATORVASTATIN CALCIUM 20 MG: 20 TABLET, FILM COATED ORAL at 08:35

## 2022-09-25 RX ADMIN — SODIUM CHLORIDE, PRESERVATIVE FREE 10 ML: 5 INJECTION INTRAVENOUS at 22:14

## 2022-09-25 RX ADMIN — ENOXAPARIN SODIUM 40 MG: 100 INJECTION SUBCUTANEOUS at 08:35

## 2022-09-25 RX ADMIN — Medication 1 AMPULE: at 22:13

## 2022-09-25 RX ADMIN — AMLODIPINE BESYLATE 10 MG: 5 TABLET ORAL at 08:35

## 2022-09-25 RX ADMIN — Medication 1 AMPULE: at 09:00

## 2022-09-25 RX ADMIN — SODIUM CHLORIDE, PRESERVATIVE FREE 10 ML: 5 INJECTION INTRAVENOUS at 14:00

## 2022-09-25 NOTE — PROGRESS NOTES
Bedside and Verbal shift change report given to [facility] RN (oncoming nurse) by Lizzy Heck and Dennis Daley RN (offgoing nurse). Report included the following information SBAR, Kardex, MAR, and Recent Results.

## 2022-09-25 NOTE — PROGRESS NOTES
Hospitalist Progress Note    Subjective:   Daily Progress Note: 9/25/2022 12:46 PM    Hospital Course:  Pt was admitted for nausea/vomiting, weakness,and headache. She reported having been seen by her PCP and BP medication was changed, and after 3 days of taking the medication started having nausea/vomiting at home. She started HCTZ and was taken off losartan, she is also taking amlodipine. Presented with hyponatremia, , and hypokalemia was admitted to the ICU for close observation and treatment. She was treated with IV fluids and electrolyte replacement. Subjective: Patient seen and examined the bedside. She states that she feels great. No nausea vomiting or diarrhea. She does state that she is drinking a lot of water at the instruction of the physician. Current Facility-Administered Medications   Medication Dose Route Frequency    hydrALAZINE (APRESOLINE) 20 mg/mL injection 10 mg  10 mg IntraVENous Q6H PRN    amLODIPine (NORVASC) tablet 10 mg  10 mg Oral DAILY    alcohol 62% (NOZIN) nasal  1 Ampule  1 Ampule Topical Q12H    ondansetron (ZOFRAN) injection 4 mg  4 mg IntraVENous Q6H PRN    atorvastatin (LIPITOR) tablet 20 mg  20 mg Oral DAILY    aspirin delayed-release tablet 81 mg  81 mg Oral DAILY    sodium chloride (NS) flush 5-40 mL  5-40 mL IntraVENous Q8H    sodium chloride (NS) flush 5-40 mL  5-40 mL IntraVENous PRN    acetaminophen (TYLENOL) tablet 650 mg  650 mg Oral Q6H PRN    Or    acetaminophen (TYLENOL) suppository 650 mg  650 mg Rectal Q6H PRN    polyethylene glycol (MIRALAX) packet 17 g  17 g Oral DAILY PRN    ondansetron (ZOFRAN ODT) tablet 4 mg  4 mg Oral Q8H PRN    Or    ondansetron (ZOFRAN) injection 4 mg  4 mg IntraVENous Q6H PRN    enoxaparin (LOVENOX) injection 40 mg  40 mg SubCUTAneous DAILY        Review of Systems:    Review of Systems   Constitutional:  Negative for fever and malaise/fatigue. Respiratory:  Negative for cough and shortness of breath. Cardiovascular:  Negative for chest pain and palpitations. Gastrointestinal:  Negative for abdominal pain, diarrhea, heartburn, nausea and vomiting. Genitourinary:  Negative for dysuria and urgency. Musculoskeletal: Negative. Neurological:  Negative for dizziness and headaches. Objective:     Visit Vitals  /76 (BP 1 Location: Left upper arm, BP Patient Position: At rest)   Pulse 86   Temp 98.4 °F (36.9 °C)   Resp 18   Ht 5' 6\" (1.676 m)   Wt 75.2 kg (165 lb 12.6 oz)   SpO2 97%   Breastfeeding No   BMI 26.76 kg/m²      O2 Device: None (Room air)    Temp (24hrs), Av.2 °F (36.8 °C), Min:98 °F (36.7 °C), Max:98.4 °F (36.9 °C)      No intake/output data recorded.  1901 -  0700  In: 2658.8 [P.O.:1350; I.V.:1308.8]  Out: -     PHYSICAL EXAM:    Physical Exam  Constitutional:       General: She is not in acute distress. HENT:      Head: Normocephalic and atraumatic. Cardiovascular:      Rate and Rhythm: Normal rate and regular rhythm. Pulses: Normal pulses. Heart sounds: Normal heart sounds. Pulmonary:      Effort: Pulmonary effort is normal.      Breath sounds: Normal breath sounds. Abdominal:      General: Bowel sounds are normal.      Palpations: Abdomen is soft. Musculoskeletal:         General: Normal range of motion. Cervical back: Normal range of motion and neck supple. Skin:     General: Skin is warm and dry. Neurological:      Mental Status: She is oriented to person, place, and time.    Psychiatric:         Mood and Affect: Mood normal.         Behavior: Behavior normal.          Data Review    Recent Results (from the past 24 hour(s))   PHOSPHORUS    Collection Time: 22 12:01 PM   Result Value Ref Range    Phosphorus 3.5 2.6 - 4.7 MG/DL   METABOLIC PANEL, BASIC    Collection Time: 22 12:01 PM   Result Value Ref Range    Sodium 129 (L) 136 - 145 mmol/L    Potassium 4.5 3.5 - 5.1 mmol/L    Chloride 97 97 - 108 mmol/L    CO2 26 21 - 32 mmol/L    Anion gap 6 5 - 15 mmol/L    Glucose 93 65 - 100 mg/dL    BUN 6 6 - 20 MG/DL    Creatinine 0.70 0.55 - 1.02 MG/DL    BUN/Creatinine ratio 9 (L) 12 - 20      GFR est AA >60 >60 ml/min/1.73m2    GFR est non-AA >60 >60 ml/min/1.73m2    Calcium 8.5 8.5 - 10.1 MG/DL   SODIUM    Collection Time: 09/24/22  6:23 PM   Result Value Ref Range    Sodium 130 (L) 136 - 145 mmol/L   TSH 3RD GENERATION    Collection Time: 09/25/22  3:20 AM   Result Value Ref Range    TSH 2.13 0.36 - 3.74 uIU/mL   SODIUM    Collection Time: 09/25/22  3:20 AM   Result Value Ref Range    Sodium 127 (L) 136 - 145 mmol/L       No orders to display       Active Problems:    Hyponatremia (9/21/2022)      Assessment/Plan:   Severe hyponatremia POA   Severe hypokalemia , K2.1 on admission, now 4.5     -Suspected from volume depletion /chlorthalidone/nausea vomiting   -Admitted to ICU on 9/21 , (6-8 increasing 24 hours )with IVF  -Sodium 127 today, patient reports good p.o. intake, DC IVF  -TSH 2.13  -Goal Na > 130  - Trend Na  -Will ask nephrology for consultation  -Asked patient to stop drinking a lot of water, will place on FR 1200 cc      2. Nausea/vomiting- diet has been advanced, no further s/s    3. Hypertension- c/w norvasc    4.  Discharge- anticipate next 24-48 hrs depending on clinical course            DVT Prophylaxis: lovenox  Code Status:  Full Code  POA: NOK   Nas      Care Plan discussed with:   Patient and nurse    Alisson Gonzales MD

## 2022-09-25 NOTE — PROGRESS NOTES
Problem: Falls - Risk of  Goal: *Absence of Falls  Description: Document Beckham Reason Fall Risk and appropriate interventions in the flowsheet.   Outcome: Progressing Towards Goal  Note: Fall Risk Interventions:  Mobility Interventions: Patient to call before getting OOB    Mentation Interventions: Bed/chair exit alarm    Medication Interventions: Assess postural VS orthostatic hypotension, Patient to call before getting OOB    Elimination Interventions: Call light in reach              Problem: Patient Education: Go to Patient Education Activity  Goal: Patient/Family Education  Outcome: Progressing Towards Goal

## 2022-09-25 NOTE — PROGRESS NOTES
Problem: Falls - Risk of  Goal: *Absence of Falls  Description: Document Earma Regine Fall Risk and appropriate interventions in the flowsheet.   9/25/2022 0029 by Dale Suggs RN  Outcome: Progressing Towards Goal  Note: Fall Risk Interventions:  Mobility Interventions: Patient to call before getting OOB    Mentation Interventions: Bed/chair exit alarm    Medication Interventions: Assess postural VS orthostatic hypotension, Patient to call before getting OOB    Elimination Interventions: Call light in reach           9/25/2022 0029 by Dale Suggs RN  Outcome: Progressing Towards Goal  Note: Fall Risk Interventions:  Mobility Interventions: Patient to call before getting OOB    Mentation Interventions: Bed/chair exit alarm    Medication Interventions: Assess postural VS orthostatic hypotension, Patient to call before getting OOB    Elimination Interventions: Call light in reach              Problem: Patient Education: Go to Patient Education Activity  Goal: Patient/Family Education  9/25/2022 0029 by Dael Suggs RN  Outcome: Progressing Towards Goal  9/25/2022 0029 by Dale Suggs RN  Outcome: Progressing Towards Goal

## 2022-09-25 NOTE — CONSULTS
NEPHROLOGY CONSULT NOTE     Patient: Zach Carrillo MRN: [id number]  PCP: Rian Burkitt, MD   :     1965  Age:   60 y.o. Sex:  female      Referring physician: Kirk Hood MD  Reason for consultation: 60 y.o. female with Hyponatremia [Q31.6] complicated by MICHELET   Admission Date: 2022 10:09 AM  LOS: 4 days      ASSESSMENT and PLAN :   Severe hyponatremia:  - Na on admission 110 on   - slowly corrected to 130 yesterday, down to 127  - agree with FR  - repeat labs now  - if Na improving, can be d/c'd home with f/up labs next week  - no thiazides upon d/c    HTN:  - cont present meds    N/V:  - improving  - on PRN zofran     Active Problems / Assessment AAActive  : Active Problems:    Hyponatremia (2022)         Subjective:   HPI: Zach Carrillo is a 60 y.o.  female who has been admitted to the hospital for n/v, weakness, HA on . She was found to have a Na of 110. She was on HCTZ at home, which was stopped. She was initially in the ICU and her Na corrected with IVF and electrolyte repletion. Her na today was 127, down from 130 yesterday. She is feeling well. No further n/v. She admits to drinking a lot of water and has not been on a FR until now. No HA, cp, sob reported at this time. Past Medical Hx:   No past medical history on file. Past Surgical Hx:     Past Surgical History:   Procedure Laterality Date    HX TONSILLECTOMY      ND EXTRAC ERUPTED TOOTH/EXPOSED ROOT Bilateral 3/30/2016    Patient Has Implants       Medications:  Prior to Admission medications    Medication Sig Start Date End Date Taking? Authorizing Provider   amLODIPine (NORVASC) 5 mg tablet Take 1 Tablet by mouth daily. 22   Rian Burkitt, MD   chlorthalidone (HYGROTON) 25 mg tablet Take 1 Tablet by mouth daily. 22   Rian Burkitt, MD   atorvastatin (LIPITOR) 20 mg tablet Take 1 Tablet by mouth daily.  22   Rian Burkitt, MD   losartan (COZAAR) 100 mg tablet Take 1 Tablet by mouth daily. 4/1/22   Jaime Carmona NP   aspirin delayed-release 81 mg tablet Take 81 mg by mouth. Provider, Historical   trospium (SANCTURA) 20 mg tablet TK 1 T PO BID 10/6/20   Provider, Historical   calcium-cholecalciferol, D3, (CALTRATE 600+D) tablet Take 1 Tab by mouth daily. Provider, Historical       Allergies   Allergen Reactions    Hydrochlorothiazide Other (comments)     Low sodium       Social Hx:  reports that she quit smoking about 7 years ago. Her smoking use included cigarettes. She has never used smokeless tobacco. She reports current alcohol use. She reports that she does not use drugs. Family History   Problem Relation Age of Onset    Hypertension Mother     Heart Disease Father 62        MI    Lung Disease Father         smoker, emphysema    No Known Problems Sister     Hypertension Brother     Diabetes Paternal Grandmother        Review of Systems:  A twelve point review of system was performed today. Pertinent positives and negatives are mentioned in the HPI. The reminder of the ROS is negative and noncontributory. Objective:    Vitals:    Vitals:    09/24/22 1330 09/24/22 1530 09/24/22 2053 09/25/22 0756   BP: 139/82 (!) 140/91 (!) 145/84 138/76   Pulse: 90 83 85 86   Resp: 19 22 16 18   Temp:  98 °F (36.7 °C) 98.3 °F (36.8 °C) 98.4 °F (36.9 °C)   SpO2: 100%  97% 97%   Weight:       Height:         I&O's:  09/24 0701 - 09/25 0700  In: 2076.3 [P.O.:1350; I.V.:726.3]  Out: -   Visit Vitals  /76 (BP 1 Location: Left upper arm, BP Patient Position: At rest)   Pulse 86   Temp 98.4 °F (36.9 °C)   Resp 18   Ht 5' 6\" (1.676 m)   Wt 75.2 kg (165 lb 12.6 oz)   SpO2 97%   Breastfeeding No   BMI 26.76 kg/m²       Physical Exam:  General:Alert, No distress,   HEENT: Eyes are PERRL. Conjunctiva without pallor ,erythema. The sclerae without icterus.  .   Neck:Supple,no mass palpable  Lungs : Clears to auscultation Bilaterally, Normal respiratory effort  CVS: RRR, S1 S2 normal, No rub, no LE edema  Abdomen: Soft, Non tender, No hepatosplenomegaly, bowel sounds present  Extremities: No cyanosis, No clubbing  Skin: No rash or lesions. Lymph nodes: No palpable nodes  MS: No joint swelling, erythema, warmth  Neurologic: non focal, AAO x 3  Psych: normal affect    Laboratory Results:    Lab Results   Component Value Date    BUN 6 09/24/2022     (L) 09/25/2022    K 4.5 09/24/2022    CL 97 09/24/2022    CO2 26 09/24/2022       Lab Results   Component Value Date    BUN 6 09/24/2022    BUN 6 09/24/2022    BUN 7 09/24/2022    BUN 8 09/23/2022    BUN 8 09/23/2022    K 4.5 09/24/2022    K 4.0 09/24/2022    K 4.1 09/24/2022    K 4.0 09/23/2022    K 3.3 (L) 09/23/2022       Lab Results   Component Value Date    WBC 5.9 09/24/2022    RBC 3.86 09/24/2022    HGB 11.4 (L) 09/24/2022    HCT 31.7 (L) 09/24/2022    MCV 82.1 09/24/2022    MCH 29.5 09/24/2022    RDW 12.1 09/24/2022     09/24/2022       Lab Results   Component Value Date    PHOS 3.5 09/24/2022       Urine dipstick:   Lab Results   Component Value Date/Time    Color YELLOW/STRAW 09/21/2022 04:44 PM    Appearance CLEAR 09/21/2022 04:44 PM    Specific gravity 1.005 09/21/2022 04:44 PM    pH (UA) 7.0 09/21/2022 04:44 PM    Protein Negative 09/21/2022 04:44 PM    Glucose Negative 09/21/2022 04:44 PM    Ketone Negative 09/21/2022 04:44 PM    Bilirubin Negative 09/21/2022 04:44 PM    Urobilinogen 0.2 09/21/2022 04:44 PM    Nitrites Negative 09/21/2022 04:44 PM    Leukocyte Esterase Negative 09/21/2022 04:44 PM    Epithelial cells FEW 09/21/2022 04:44 PM    Bacteria Negative 09/21/2022 04:44 PM    WBC 0-4 09/21/2022 04:44 PM    RBC 0-5 09/21/2022 04:44 PM                Thank you for allowing us to participate in the care of this patient. We will follow patient.  Please dont hesitate to call with any questions    Saranya Molina MD  9/25/2022    [facility]   [address], [address] [place] [facility] [address]  Phone - [phone]   Fax - [phone]  [email or web address]

## 2022-09-25 NOTE — PROGRESS NOTES
Bedside shift report given to oncoming nurse Mindy Westbrook RN by off going nurse Evon Eugene.  Patient stable with no distress during shift change will continue to assess pt     1900  Bedside shift report given  to  oncoming nurse Rose Mary Hobbs RN by off going nurse Mindy Westbrook RN patient stable no distress during shift change awake and watching T.V

## 2022-09-26 VITALS
BODY MASS INDEX: 26.64 KG/M2 | WEIGHT: 165.79 LBS | DIASTOLIC BLOOD PRESSURE: 83 MMHG | HEIGHT: 66 IN | TEMPERATURE: 98.5 F | OXYGEN SATURATION: 100 % | SYSTOLIC BLOOD PRESSURE: 138 MMHG | RESPIRATION RATE: 16 BRPM | HEART RATE: 83 BPM

## 2022-09-26 DIAGNOSIS — I10 ESSENTIAL HYPERTENSION: ICD-10-CM

## 2022-09-26 LAB
ANION GAP SERPL CALC-SCNC: 3 MMOL/L (ref 5–15)
BUN SERPL-MCNC: 8 MG/DL (ref 6–20)
BUN/CREAT SERPL: 12 (ref 12–20)
CALCIUM SERPL-MCNC: 8.7 MG/DL (ref 8.5–10.1)
CHLORIDE SERPL-SCNC: 97 MMOL/L (ref 97–108)
CO2 SERPL-SCNC: 28 MMOL/L (ref 21–32)
CREAT SERPL-MCNC: 0.67 MG/DL (ref 0.55–1.02)
GLUCOSE SERPL-MCNC: 91 MG/DL (ref 65–100)
POTASSIUM SERPL-SCNC: 3.9 MMOL/L (ref 3.5–5.1)
SODIUM SERPL-SCNC: 128 MMOL/L (ref 136–145)

## 2022-09-26 PROCEDURE — 74011250636 HC RX REV CODE- 250/636: Performed by: NURSE PRACTITIONER

## 2022-09-26 PROCEDURE — 36415 COLL VENOUS BLD VENIPUNCTURE: CPT

## 2022-09-26 PROCEDURE — 80048 BASIC METABOLIC PNL TOTAL CA: CPT

## 2022-09-26 PROCEDURE — 74011000250 HC RX REV CODE- 250: Performed by: NURSE PRACTITIONER

## 2022-09-26 PROCEDURE — 74011250637 HC RX REV CODE- 250/637: Performed by: NURSE PRACTITIONER

## 2022-09-26 RX ORDER — LOSARTAN POTASSIUM 100 MG/1
100 TABLET ORAL DAILY
Qty: 90 TABLET | Refills: 1 | Status: SHIPPED | OUTPATIENT
Start: 2022-09-26 | End: 2022-10-12

## 2022-09-26 RX ADMIN — Medication 1 AMPULE: at 09:00

## 2022-09-26 RX ADMIN — ENOXAPARIN SODIUM 40 MG: 100 INJECTION SUBCUTANEOUS at 09:26

## 2022-09-26 RX ADMIN — AMLODIPINE BESYLATE 10 MG: 5 TABLET ORAL at 09:25

## 2022-09-26 RX ADMIN — ATORVASTATIN CALCIUM 20 MG: 20 TABLET, FILM COATED ORAL at 09:26

## 2022-09-26 RX ADMIN — SODIUM CHLORIDE, PRESERVATIVE FREE 10 ML: 5 INJECTION INTRAVENOUS at 06:48

## 2022-09-26 RX ADMIN — ASPIRIN 81 MG: 81 TABLET, COATED ORAL at 09:26

## 2022-09-26 NOTE — PROGRESS NOTES
Nephrology Progress Note  [facility] / [address], [address]  Phone - [phone]  Fax - [phone]                 Patient: Michael Gong                   YOB: 1965        Date- 9/26/2022                      Admit Date: 9/21/2022  CC: Follow up for hyponatremia          IMPRESSION & PLAN:   Severe hyponatremia:  - Na on admission 110 on 9/21  - slowly corrected to 130 yesterday, stable at 128  - agree with FR  -Can be discharged from renal standpoint  -She was given information to follow-up in the office     HTN:  - cont present meds     N/V:  - improving  - on PRN zofran   Subjective: Interval History:   -Seen and examined today.  -Sodium stable at 128    Objective:   Vitals:    09/25/22 2030 09/25/22 2347 09/26/22 0300 09/26/22 0850   BP:  (!) 144/83  138/83   Pulse:  80  83   Resp: 18 18 16 16   Temp:  98 °F (36.7 °C)  98.5 °F (36.9 °C)   SpO2:  100%  100%   Weight:       Height:          09/25 0701 - 09/26 0700  In: 960 [P.O.:960]  Out: 600 [Urine:600]  Last 3 Recorded Weights in this Encounter    09/21/22 0941 09/22/22 0330   Weight: 69.9 kg (154 lb 1.6 oz) 75.2 kg (165 lb 12.6 oz)        Physical exam:    GEN: NAD  NECK- Supple, no mass  RESP: No wheezing, Clear b/l  CVS: S1,S2  RRR  NEURO: Normal speech, Non focal  EXT: No Edema   PSYCH: Normal Mood    Chart reviewed. Pertinent Notes reviewed.      Data Review :  Recent Labs     09/26/22  0340 09/25/22  1257 09/25/22  0320 09/24/22  1823 09/24/22  1201 09/24/22  0425 09/23/22  2115 09/23/22  1659   * 128* 127*   < > 129* 129*   < > 125*   K 3.9 4.1  --   --  4.5 4.0   < > 3.3*   CL 97 92*  --   --  97 97   < > 90*   CO2 28 28  --   --  26 27   < > 28   BUN 8 7  --   --  6 6   < > 8   CREA 0.67 0.78  --   --  0.70 0.66   < > 0.66   GLU 91 90  --   --  93 91   < > 125*   CA 8.7 9.2  --   --  8.5 8.3*   < > 8.2*   MG  -- --   --   --   --  1.9  --  1.9   PHOS  --  3.6  --   --  3.5 2.4*   < > 2.7    < > = values in this interval not displayed. Recent Labs     09/24/22  0425   WBC 5.9   HGB 11.4*   HCT 31.7*        No results for input(s): FE, TIBC, PSAT, FERR in the last 72 hours.    No results found for: HBA1C, LOU6VLTS   No results found for: MCACR, MCA1, MCA2, MCA3, MCAU, MCAU2, MCALPOCT  No results found for: BNP, BNPP, BNPPPOC, XBNPT, BNPNT  US Results (most recent):  Medication list  reviewed  Current Facility-Administered Medications   Medication Dose Route Frequency    hydrALAZINE (APRESOLINE) 20 mg/mL injection 10 mg  10 mg IntraVENous Q6H PRN    amLODIPine (NORVASC) tablet 10 mg  10 mg Oral DAILY    alcohol 62% (NOZIN) nasal  1 Ampule  1 Ampule Topical Q12H    atorvastatin (LIPITOR) tablet 20 mg  20 mg Oral DAILY    aspirin delayed-release tablet 81 mg  81 mg Oral DAILY    sodium chloride (NS) flush 5-40 mL  5-40 mL IntraVENous Q8H    sodium chloride (NS) flush 5-40 mL  5-40 mL IntraVENous PRN    acetaminophen (TYLENOL) tablet 650 mg  650 mg Oral Q6H PRN    Or    acetaminophen (TYLENOL) suppository 650 mg  650 mg Rectal Q6H PRN    polyethylene glycol (MIRALAX) packet 17 g  17 g Oral DAILY PRN    ondansetron (ZOFRAN ODT) tablet 4 mg  4 mg Oral Q8H PRN    Or    ondansetron (ZOFRAN) injection 4 mg  4 mg IntraVENous Q6H PRN    enoxaparin (LOVENOX) injection 40 mg  40 mg SubCUTAneous DAILY        Pawel Palacios MD  9/26/2022

## 2022-09-26 NOTE — PROGRESS NOTES
Transition of Care Plan:    RUR: 5%  Disposition: Home  Follow up appointments:PCP  DME needed:none  Transportation at [facility] to transport  [place] or means to access home:     patient   IM Medicare Letter:N/A  Is patient a [place] and connected with the [place]? N/A  If yes, was [place] transfer form completed and [facility] notified? N/A  Caregiver Contact:Elder Garay spouse [phone]  Discharge Caregiver contacted prior to discharge? Patient to contact   Care Conference needed?:       no    Care Management Interventions  PCP Verified by CM: Yes (within the last month)  [place] Signup: No  Physical Therapy Consult: No  Occupational Therapy Consult: No  Speech Therapy Consult: No  Support Systems: Spouse/Significant Other  Confirm Follow Up Transport: Family ()  Discharge Location  Patient Expects to be Discharged to[de-identified] Home    Reason for Admission:  hyponatremia                     RUR Score:          5%           Plan for utilizing home health:      none indicated    PCP: First and Last name: Mykel Montoya MD     Name of Practice:    Are you a current patient: Yes/No: yes   Approximate date of last visit: within last month   Can you participate in a virtual visit with your PCP: no                    Current Advanced Directive/Advance Care Plan: Full Code      Healthcare Decision Maker:   Click here to complete [address] including selection of the Healthcare Decision Maker Relationship (ie \"Primary\")           Scotty Landau spouse [phone]                  Transition of Care Plan:         CM met with patient - confirmed demographics on file - independent with ADL's nad IADL's - uses no DMe - obtains meds rom [facility] -  to transport upon discharge.  ZULY Villagomez

## 2022-09-26 NOTE — TELEPHONE ENCOUNTER
Future Appointments:  Future Appointments   Date Time Provider Aristides Vazquez   10/27/2022  9:45 AM Jerrica Ivey MD [facility] BS AMB        Last Appointment With Me:  9/12/2022     Requested Prescriptions     Pending Prescriptions Disp Refills    losartan (COZAAR) 100 mg tablet 90 Tablet 1     Sig: Take 1 Tablet by mouth daily.

## 2022-09-26 NOTE — PROGRESS NOTES
Bedside shift change report given to Julia RN (oncoming nurse) by Karla Saba RN (offgoing nurse). Report included the following information SBAR, Kardex, Intake/Output, and MAR.

## 2022-09-26 NOTE — DISCHARGE SUMMARY
Hospitalist Discharge Summary     Patient ID:  Mayi Rosen  [id number]  53 y.o.  1965    PCP on record: Alaina Morelos MD    Admit date: 9/21/2022  Discharge date and time: 9/26/2022      DISCHARGE DIAGNOSIS:          CONSULTATIONS:  IP CONSULT TO NEPHROLOGY    Excerpted HPI from H&P of Myrna Will MD:    Ms. Gwen Knowles is a 76-year-old  female with history of hypertension and hyperlipidemia. Last week she was started on thiazide diuretic and taken off of losartan. She developed intractable nausea and vomiting about 4 days ago and finally decided to come to the ER today because she could not keep anything down. In ER she was found to have sodium of 110. She is getting admitted to ICU for further care. She reports cramping in her legs. Her potassium was also 2.1. She denies chest pain, abdominal pain, diarrhea, fever, URI symptoms, change in bowel or urinary habits.  ______________________________________________________________________  DISCHARGE SUMMARY/HOSPITAL COURSE:  for full details see H&P, daily progress notes, labs, consult notes. Severe hyponatremia POA   Severe hypokalemia , K2.1 on admission, now 4.5      -Suspected from volume depletion /chlorthalidone/nausea vomiting   -Admitted to ICU on 9/21 , (6-8 increasing 24 hours )with IVF  -Sodium 127 today, patient reports good p.o. intake, DC IVF  -TSH 2.13  -Na stable at 127-128  -Will need to follow up with nephrology as an outpatient and get repeat blood work  -Nephrology consult appreciated  -Fluid restrict 1500 cc / day    Patient felt markedly improved and she stable to be discharged home.      _______________________________________________________________________  Patient seen and examined by me on discharge day. Pertinent Findings:  Gen:    Not in distress  Chest: Clear lungs  CVS:   Regular rhythm.   No edema  Abd:  Soft, not distended, not tender  Neuro:  Alert, Oriented x 4, grossly non focal exam  _______________________________________________________________________  DISCHARGE MEDICATIONS:   Current Discharge Medication List        CONTINUE these medications which have NOT CHANGED    Details   amLODIPine (NORVASC) 5 mg tablet Take 1 Tablet by mouth daily. Qty: 90 Tablet, Refills: 1    Associated Diagnoses: Essential hypertension      atorvastatin (LIPITOR) 20 mg tablet Take 1 Tablet by mouth daily. Qty: 90 Tablet, Refills: 1    Associated Diagnoses: Mixed hyperlipidemia      losartan (COZAAR) 100 mg tablet Take 1 Tablet by mouth daily. Qty: 90 Tablet, Refills: 1    Associated Diagnoses: Essential hypertension      aspirin delayed-release 81 mg tablet Take 81 mg by mouth. trospium (SANCTURA) 20 mg tablet TK 1 T PO BID      calcium-cholecalciferol, D3, (CALTRATE 600+D) tablet Take 1 Tab by mouth daily. STOP taking these medications       chlorthalidone (HYGROTON) 25 mg tablet Comments:   Reason for Stopping:               My Recommended Diet, Activity, Wound Care, and follow-up labs are listed in the patient's Discharge Insturctions which I have personally completed and reviewed.   Risk of deterioration: Low    Condition at Discharge:  Stable  _____________________________________________________________________    Disposition  Home with family, no needs  ____________________________________________________________________    Care Plan discussed with:   Patient, Family, RN, Care Manager, Consultant    ____________________________________________________________________    Code Status: Full Code  ____________________________________________________________________      Condition at Discharge:  Stable  _____________________________________________________________________  Follow up with:   PCP : Rosalba Matos MD  Follow-up Information       Follow up With Specialties Details Why Contact Info    Rosalba Matos MD Internal Medicine Physician   [address] [facility] [address] [place]      Gagan Preston MD Nephrology Follow up Repeat blood work in 1 week [address]  [phone]                  Total time in minutes spent coordinating this discharge (includes going over instructions, follow-up, prescriptions, and preparing report for sign off to her PCP) :  35 minutes    Signed:  Izabel Keen MD

## 2022-09-26 NOTE — TELEPHONE ENCOUNTER
PCP: Noemi Schmitt MD     Last appt: 9/12/2022     Future Appointments   Date Time Provider Aristides Vazquez   10/27/2022  9:45 AM Noemi Schmitt MD [facility] BS AMB          Requested Prescriptions     Pending Prescriptions Disp Refills    losartan (COZAAR) 100 mg tablet 90 Tablet 1     Sig: Take 1 Tablet by mouth daily.

## 2022-09-28 LAB
BACTERIA SPEC CULT: NORMAL
SERVICE CMNT-IMP: NORMAL

## 2022-10-06 ENCOUNTER — TELEPHONE (OUTPATIENT)
Dept: INTERNAL MEDICINE CLINIC | Age: 57
End: 2022-10-06

## 2022-10-06 NOTE — TELEPHONE ENCOUNTER
Says she will see nephrologist ordered labs and potassium was 6.1 and sodium 133. Labs were drawn 2 days ago. When she was released from hospital told to restart losartan 100mg daily which she has been taking. Advised her to call nephrology to find out what she should do, she will call now. Stop losartan until hears otherwise and increase amlodipine to 10mg daily until she hears from nephrology. Has also been eating high K diet and will stop.

## 2022-10-12 ENCOUNTER — OFFICE VISIT (OUTPATIENT)
Dept: INTERNAL MEDICINE CLINIC | Age: 57
End: 2022-10-12
Payer: COMMERCIAL

## 2022-10-12 VITALS
HEART RATE: 83 BPM | WEIGHT: 152.6 LBS | RESPIRATION RATE: 16 BRPM | SYSTOLIC BLOOD PRESSURE: 122 MMHG | DIASTOLIC BLOOD PRESSURE: 82 MMHG | OXYGEN SATURATION: 96 % | BODY MASS INDEX: 24.53 KG/M2 | TEMPERATURE: 97.7 F | HEIGHT: 66 IN

## 2022-10-12 DIAGNOSIS — E87.1 HYPONATREMIA: ICD-10-CM

## 2022-10-12 DIAGNOSIS — Z09 HOSPITAL DISCHARGE FOLLOW-UP: Primary | ICD-10-CM

## 2022-10-12 DIAGNOSIS — E78.2 MIXED HYPERLIPIDEMIA: ICD-10-CM

## 2022-10-12 DIAGNOSIS — I10 ESSENTIAL HYPERTENSION: ICD-10-CM

## 2022-10-12 PROCEDURE — 3074F SYST BP LT 130 MM HG: CPT | Performed by: INTERNAL MEDICINE

## 2022-10-12 PROCEDURE — 99214 OFFICE O/P EST MOD 30 MIN: CPT | Performed by: INTERNAL MEDICINE

## 2022-10-12 PROCEDURE — 1111F DSCHRG MED/CURRENT MED MERGE: CPT | Performed by: INTERNAL MEDICINE

## 2022-10-12 PROCEDURE — 3078F DIAST BP <80 MM HG: CPT | Performed by: INTERNAL MEDICINE

## 2022-10-12 RX ORDER — FLUOXETINE 10 MG/1
10 TABLET ORAL DAILY
COMMUNITY
Start: 2022-07-18

## 2022-10-12 RX ORDER — AMLODIPINE BESYLATE 10 MG/1
10 TABLET ORAL DAILY
COMMUNITY

## 2022-10-12 NOTE — PROGRESS NOTES
Transitional Care Management Progress Note    Patient: Lorenzo Kruse  : 1965  PCP: Bouchra Oliveira MD    Date of office visit: 10/12/2022   Date of admission: 22  Date of discharge: 22  Hospital: [facility]    Call initiated w/i 2 business dates of discharge: *No response documented in the last 14 days   Date of the most recent call to the patient: *No documented post hospital discharge outreach found in the last 14 days        Assessment/Plan:   Diagnoses and all orders for this visit:    1. Hospital discharge follow-up  -     [place] DISCHARGE MEDS RECONCILED W/ CURRENT OUTPATIENT MED LIST    2. Essential hypertension    3. Hyponatremia    4. Mixed hyperlipidemia  -     LIPID PANEL; Future  -     METABOLIC PANEL, COMPREHENSIVE; Future  Hyponatremia with improved sodium and repeat labs pending. She has follow-up with nephrology. Blood pressure is at goal on current regimen, continue. Get lipids in the future prior to her next visit. Follow-up and Dispositions    Return in about 6 months (around 2023) for bp, chol, labs prior. Subjective:   Lorenzo Kruse is a 60 y.o. female presenting today for follow-up after hospital discharge. This encounter and supporting documentation was reviewed if available. Medication reconciliation was performed today. The main problem requiring admission was hyponatremia, hypokalemia. Complications during admission: none  Patient admitted with hyponatremia and hypokalemia after several days of nausea vomiting and after starting chlorthalidone for uncontrolled hypertension. She was initially admitted to the ICU for severe hyponatremia and treated with IV fluids. She was seen by nephrology who recommended fluid restriction to 1500 cc/day and no more thiazide diuretics.      Interval history/Current status:   Feels much better  Will get labs tomorrow - repeat bmp  No n/v, chest pain or sob, no confusion, or HA now  Energy is improved   -130/80  Has lost 6# since last visit  Admitting symptoms have: significantly improved      Medications marked \"taking\" at this time:  Prior to Admission medications    Medication Sig Start Date End Date Taking? Authorizing Provider   FLUoxetine (PROzac) 10 mg tablet Take 10 mg by mouth daily. 7/18/22  Yes Provider, Historical   amLODIPine (NORVASC) 10 mg tablet Take 10 mg by mouth daily. Yes Provider, Historical   aspirin delayed-release 81 mg tablet Take 81 mg by mouth. Yes Provider, Historical   trospium (SANCTURA) 20 mg tablet TK 1 T PO BID 10/6/20  Yes Provider, Historical   calcium-cholecalciferol, D3, (CALTRATE 600+D) tablet Take 1 Tab by mouth daily. Yes Provider, Historical   atorvastatin (LIPITOR) 20 mg tablet TAKE 1 TABLET BY MOUTH EVERY DAY 10/29/22   Jun Leyva MD   metoprolol succinate (TOPROL-XL) 25 mg XL tablet Take 1 Tablet by mouth daily. 10/17/22   Jun Leyva MD        ROS             Objective:   /82 (BP 1 Location: Left upper arm, BP Patient Position: Sitting, BP Cuff Size: Adult)   Pulse 83   Temp 97.7 °F (36.5 °C) (Oral)   Resp 16   Ht 5' 6\" (1.676 m)   Wt 152 lb 9.6 oz (69.2 kg)   SpO2 96%   BMI 24.63 kg/m²      Physical Exam   Visit Vitals  /82 (BP 1 Location: Left upper arm, BP Patient Position: Sitting, BP Cuff Size: Adult)   Pulse 83   Temp 97.7 °F (36.5 °C) (Oral)   Resp 16   Ht 5' 6\" (1.676 m)   Wt 152 lb 9.6 oz (69.2 kg)   SpO2 96%   BMI 24.63 kg/m²     Constitutional: Appears well-developed and well-nourished. No distress. HENT:   Head: Normocephalic and atraumatic. Eyes: No scleral icterus. Neck: no lad, no tm, supple   Cardiovascular: Normal S1/S2, regular rhythm. No murmurs, rubs, or gallops. Pulmonary/Chest: Effort normal and breath sounds normal. No respiratory distress. No wheezes, rhonchi, or rales. Abdomen: Soft, NT/ND, +BS, no rebound or guarding, no masses, no HSM appreciated. Ext: No edema. Neurological: Alert. Psychiatric: Normal mood and affect. Behavior is normal.    We discussed the expected course, resolution and complications of the diagnosis(es) in detail. Medication risks, benefits, costs, interactions, and alternatives were discussed as indicated. I advised her to contact the office if her condition worsens, changes or fails to improve as anticipated. She expressed understanding with the diagnosis(es) and plan.      Bridger Duran MD

## 2022-10-12 NOTE — PROGRESS NOTES
Gretta Choe  Identified pt with two pt identifiers(name and ). Chief Complaint   Patient presents with    Hospital Follow Up     Room 2B //        Reviewed record In preparation for visit and have obtained necessary documentation. 1. Have you been to the ER, urgent care clinic or hospitalized since your last visit? Yes. [facility]     2. Have you seen or consulted any other health care providers outside of the [address] [place] [address] since your last visit? Include any pap smears or colon screening. Yes. Dr. Evelyn Daniel - Nephrologist     Patient has an advance directive. Vitals reviewed with provider.     Health Maintenance reviewed:     Health Maintenance Due   Topic    Cervical cancer screen           Wt Readings from Last 3 Encounters:   10/12/22 152 lb 9.6 oz (69.2 kg)   22 165 lb 12.6 oz (75.2 kg)   22 158 lb 3.2 oz (71.8 kg)        Temp Readings from Last 3 Encounters:   22 98.5 °F (36.9 °C)   22 98 °F (36.7 °C) (Oral)   22 98.2 °F (36.8 °C) (Oral)        BP Readings from Last 3 Encounters:   22 138/83   22 115/76   22 120/86        Pulse Readings from Last 3 Encounters:   22 83   22 98   22 88        Vitals:    10/12/22 1105   Resp: 16   Weight: 152 lb 9.6 oz (69.2 kg)   Height: 5' 6\" (1.676 m)   PainSc:   0 - No pain          Learning Assessment:   :       Learning Assessment 3/1/2022   PRIMARY LEARNER Patient   HIGHEST LEVEL OF EDUCATION - PRIMARY LEARNER  SOME COLLEGE   BARRIERS PRIMARY LEARNER NONE   PRIMARY LANGUAGE ENGLISH   LEARNER PREFERENCE PRIMARY READING   ANSWERED BY Patient   RELATIONSHIP SELF        Depression Screening:   :       3 most recent PHQ Screens 3/1/2022   Little interest or pleasure in doing things Not at all   Feeling down, depressed, irritable, or hopeless Not at all   Total Score PHQ 2 0   Trouble falling or staying asleep, or sleeping too much -   Feeling tired or having little energy -   Poor appetite, weight loss, or overeating -   Feeling bad about yourself - or that you are a failure or have let yourself or your family down -   Trouble concentrating on things such as school, work, reading, or watching TV -   Moving or speaking so slowly that other people could have noticed; or the opposite being so fidgety that others notice -   Thoughts of being better off dead, or hurting yourself in some way -   PHQ 9 Score -        Fall Risk Assessment:   :       Fall Risk Assessment, last 12 mths 3/4/2021   Able to walk? Yes   Fall in past 12 months? 0   Do you feel unsteady? 0   Are you worried about falling 0        Abuse Screening:   :       Abuse Screening Questionnaire 11/26/2018   Do you ever feel afraid of your partner? N   Are you in a relationship with someone who physically or mentally threatens you? N   Is it safe for you to go home?  Y        ADL Screening:   :       ADL Assessment 11/26/2018   Feeding yourself No Help Needed   Getting from bed to chair No Help Needed   Getting dressed No Help Needed   Bathing or showering No Help Needed   Walk across the room (includes cane/walker) No Help Needed   Using the telphone No Help Needed   Taking your medications No Help Needed   Preparing meals No Help Needed   Managing money (expenses/bills) No Help Needed   Moderately strenuous housework (laundry) No Help Needed   Shopping for personal items (toiletries/medicines) No Help Needed   Shopping for groceries No Help Needed   Driving No Help Needed   Climbing a flight of stairs No Help Needed   Getting to places beyond walking distances No Help Needed

## 2022-10-17 ENCOUNTER — TELEPHONE (OUTPATIENT)
Dept: INTERNAL MEDICINE CLINIC | Age: 57
End: 2022-10-17

## 2022-10-17 RX ORDER — METOPROLOL SUCCINATE 25 MG/1
25 TABLET, EXTENDED RELEASE ORAL DAILY
Qty: 30 TABLET | Refills: 2 | Status: SHIPPED | OUTPATIENT
Start: 2022-10-17

## 2022-10-17 NOTE — TELEPHONE ENCOUNTER
It is a common side effect of amlodipine. Stop it and start metoprolol 25mg daily and monitor BP. Swelling should resolve on its own.

## 2022-10-17 NOTE — TELEPHONE ENCOUNTER
Patient called swelling of ankles. Says started new medication last week. Amlodipine. Be on medicine for three days. Swelling started yesterday. Taking 10 mg now. Was advised to call the office. Patient is not taking medication this morning until she hear from provider.

## 2022-10-18 ENCOUNTER — TELEPHONE (OUTPATIENT)
Dept: INTERNAL MEDICINE CLINIC | Age: 57
End: 2022-10-18

## 2022-10-18 NOTE — TELEPHONE ENCOUNTER
Pt wanted to make sure it was okay to take both metoprolol and prozac together. Per Dr Kavya Neal, yes , pt notified.

## 2022-10-18 NOTE — TELEPHONE ENCOUNTER
Pt said that young changed her medication and she wanted to know if it was safe to take and if she needed to continue to take prozac? Pt has a lot of questions.

## 2022-10-29 DIAGNOSIS — E78.2 MIXED HYPERLIPIDEMIA: ICD-10-CM

## 2022-10-29 RX ORDER — ATORVASTATIN CALCIUM 20 MG/1
TABLET, FILM COATED ORAL
Qty: 90 TABLET | Refills: 1 | Status: SHIPPED | OUTPATIENT
Start: 2022-10-29

## 2023-01-20 ENCOUNTER — NURSE TRIAGE (OUTPATIENT)
Dept: OTHER | Facility: CLINIC | Age: 58
End: 2023-01-20

## 2023-01-20 ENCOUNTER — TELEPHONE (OUTPATIENT)
Dept: INTERNAL MEDICINE CLINIC | Age: 58
End: 2023-01-20

## 2023-01-20 NOTE — TELEPHONE ENCOUNTER
Pt has felt fine since her appointment in October, has not checked her BP. Yesterday she had a headache and BP was 155/94. Today BP was 145/91, pulse 71 this morning, 2 hours later 159/97, 66  and 3:30 pm 146/92, 69. She has a slight headache, pain 2 - 3 and lightheaded. She has not been waiting 10 minutes to check pressure, informed in proper technique. She is given an appointment for Monday. Will write BP readings down to bring to appointment, informed to bring BP machine to her appointment. Advised if she has any SOB or chest pain to go to ER, pt stated understanding.

## 2023-01-20 NOTE — TELEPHONE ENCOUNTER
[facility]    Received call from [facility] with [employer] Complaint. Subjective: Caller states \"HTN\"     Current Symptoms:   145/91, 71 - 159/97, 66 - 146/92, 69  Recent medication changes with BP medications   Mild headaches for a couple days   Light headed slightly     Onset:    Couple headache     Pain Severity:   Headache pain - 3/10, hasnt needed to tx it    Temperature:    None     What has been tried:   BP medications     Recommended disposition:   See PCP within 3 days - Warm transfer to [facility] advice provided, patient verbalizes understanding; denies any other questions or concerns; instructed to call back for any new or worsening symptoms. Attention Provider: Thank you for allowing me to participate in the care of your patient. The patient was connected to triage in response to information provided to the [facility]. Please do not respond through this encounter as the response is not directed to a shared pool.     Reason for Disposition   [7] Systolic BP  >= 216 OR Diastolic >= 80 AND [3] pregnant    Protocols used: Blood Pressure - High-ADULT-

## 2023-01-23 ENCOUNTER — OFFICE VISIT (OUTPATIENT)
Dept: INTERNAL MEDICINE CLINIC | Age: 58
End: 2023-01-23
Payer: COMMERCIAL

## 2023-01-23 VITALS
DIASTOLIC BLOOD PRESSURE: 90 MMHG | TEMPERATURE: 97.9 F | WEIGHT: 164 LBS | RESPIRATION RATE: 12 BRPM | BODY MASS INDEX: 26.36 KG/M2 | HEIGHT: 66 IN | OXYGEN SATURATION: 98 % | SYSTOLIC BLOOD PRESSURE: 150 MMHG | HEART RATE: 71 BPM

## 2023-01-23 DIAGNOSIS — I10 ESSENTIAL HYPERTENSION: Primary | ICD-10-CM

## 2023-01-23 DIAGNOSIS — F41.9 ANXIETY: ICD-10-CM

## 2023-01-23 PROCEDURE — 99214 OFFICE O/P EST MOD 30 MIN: CPT | Performed by: INTERNAL MEDICINE

## 2023-01-23 PROCEDURE — 3077F SYST BP >= 140 MM HG: CPT | Performed by: INTERNAL MEDICINE

## 2023-01-23 PROCEDURE — 3080F DIAST BP >= 90 MM HG: CPT | Performed by: INTERNAL MEDICINE

## 2023-01-23 RX ORDER — FLUOXETINE HYDROCHLORIDE 20 MG/1
20 CAPSULE ORAL DAILY
Qty: 90 CAPSULE | Refills: 1 | Status: SHIPPED | OUTPATIENT
Start: 2023-01-23

## 2023-01-23 RX ORDER — METOPROLOL SUCCINATE 50 MG/1
50 TABLET, EXTENDED RELEASE ORAL DAILY
Qty: 30 TABLET | Refills: 5 | Status: SHIPPED | OUTPATIENT
Start: 2023-01-23 | End: 2023-01-23 | Stop reason: SDUPTHER

## 2023-01-23 RX ORDER — METOPROLOL SUCCINATE 50 MG/1
50 TABLET, EXTENDED RELEASE ORAL DAILY
Qty: 90 TABLET | Refills: 1 | Status: SHIPPED | OUTPATIENT
Start: 2023-01-23

## 2023-01-23 RX ORDER — FLUOXETINE HYDROCHLORIDE 20 MG/1
20 CAPSULE ORAL DAILY
Qty: 30 CAPSULE | Refills: 5 | Status: SHIPPED | OUTPATIENT
Start: 2023-01-23 | End: 2023-01-23 | Stop reason: SDUPTHER

## 2023-01-23 NOTE — PROGRESS NOTES
Adriane Hedrick  Identified pt with two pt identifiers(name and ). Chief Complaint   Patient presents with    Elevated Blood Pressure       Reviewed record In preparation for visit and have obtained necessary documentation. 1. Have you been to the ER, urgent care clinic or hospitalized since your last visit? No     2. Have you seen or consulted any other health care providers outside of the [address] [place] [address] since your last visit? Include any pap smears or colon screening. No    Vitals reviewed with provider.     Health Maintenance reviewed:     Health Maintenance Due   Topic    Cervical cancer screen           Wt Readings from Last 3 Encounters:   23 164 lb (74.4 kg)   10/12/22 152 lb 9.6 oz (69.2 kg)   22 165 lb 12.6 oz (75.2 kg)        Temp Readings from Last 3 Encounters:   23 97.9 °F (36.6 °C) (Oral)   10/12/22 97.7 °F (36.5 °C) (Oral)   22 98.5 °F (36.9 °C)        BP Readings from Last 3 Encounters:   23 (!) 159/98   10/12/22 122/82   22 138/83        Pulse Readings from Last 3 Encounters:   23 71   10/12/22 83   22 83        Vitals:    23 1340   BP: (!) 159/98   Pulse: 71   Resp: 12   Temp: 97.9 °F (36.6 °C)   TempSrc: Oral   SpO2: 98%   Weight: 164 lb (74.4 kg)   Height: 5' 6\" (1.676 m)   PainSc:   0 - No pain          Learning Assessment:   :       Learning Assessment 3/1/2022   PRIMARY LEARNER Patient   HIGHEST LEVEL OF EDUCATION - PRIMARY LEARNER  SOME COLLEGE   BARRIERS PRIMARY LEARNER NONE   PRIMARY LANGUAGE ENGLISH   LEARNER PREFERENCE PRIMARY READING   ANSWERED BY Patient   RELATIONSHIP SELF        Depression Screening:   :       3 most recent PHQ Screens 10/12/2022   Little interest or pleasure in doing things Not at all   Feeling down, depressed, irritable, or hopeless Not at all   Total Score PHQ 2 0   Trouble falling or staying asleep, or sleeping too much -   Feeling tired or having little energy -   Poor appetite, weight loss, or overeating -   Feeling bad about yourself - or that you are a failure or have let yourself or your family down -   Trouble concentrating on things such as school, work, reading, or watching TV -   Moving or speaking so slowly that other people could have noticed; or the opposite being so fidgety that others notice -   Thoughts of being better off dead, or hurting yourself in some way -   PHQ 9 Score -        Fall Risk Assessment:   :       Fall Risk Assessment, last 12 mths 3/4/2021   Able to walk? Yes   Fall in past 12 months? 0   Do you feel unsteady? 0   Are you worried about falling 0        Abuse Screening:   :       Abuse Screening Questionnaire 11/26/2018   Do you ever feel afraid of your partner? N   Are you in a relationship with someone who physically or mentally threatens you? N   Is it safe for you to go home?  Y        ADL Screening:   :       ADL Assessment 11/26/2018   Feeding yourself No Help Needed   Getting from bed to chair No Help Needed   Getting dressed No Help Needed   Bathing or showering No Help Needed   Walk across the room (includes cane/walker) No Help Needed   Using the telphone No Help Needed   Taking your medications No Help Needed   Preparing meals No Help Needed   Managing money (expenses/bills) No Help Needed   Moderately strenuous housework (laundry) No Help Needed   Shopping for personal items (toiletries/medicines) No Help Needed   Shopping for groceries No Help Needed   Driving No Help Needed   Climbing a flight of stairs No Help Needed   Getting to places beyond walking distances No Help Needed

## 2023-01-23 NOTE — PROGRESS NOTES
HPI  Ms. Matilda Holder is a 59y.o. year old female, she is seen today for HTN. Had a HA last week for 2 days last week - checked BP and was 145/91, 163/103, 154/93. Over the weekend BP was 147/88, 147/82, 147/90, 158/98, 140/92. HR 63-71  Has been feeling more anxious lately. Notes when checks BP and it's high she feels more anxious. No chest pain, sob, no edema. Feels stressed a lot - lots of responsibilities. Weight is up 14# in 3 mos - gained weight back that she lost - doesn't monitor sodium intake. Chief Complaint   Patient presents with    Elevated Blood Pressure        Prior to Admission medications    Medication Sig Start Date End Date Taking? Authorizing Provider   FLUoxetine (PROzac) 20 mg capsule Take 1 Capsule by mouth daily. 1/23/23  Yes Blayne Borges MD   metoprolol succinate (TOPROL-XL) 50 mg XL tablet Take 1 Tablet by mouth daily. 1/23/23  Yes Blayne Borges MD   atorvastatin (LIPITOR) 20 mg tablet TAKE 1 TABLET BY MOUTH EVERY DAY 10/29/22  Yes Blayne Borges MD   aspirin delayed-release 81 mg tablet Take 81 mg by mouth. Yes Provider, Historical   trospium (SANCTURA) 20 mg tablet TK 1 T PO BID 10/6/20  Yes Provider, Historical   calcium-cholecalciferol, D3, (CALTRATE 600+D) tablet Take 1 Tab by mouth daily. Yes Provider, Historical   FLUoxetine (PROzac) 20 mg capsule Take 1 Capsule by mouth daily. 1/23/23 1/23/23  Blayne Borges MD   metoprolol succinate (TOPROL-XL) 50 mg XL tablet Take 1 Tablet by mouth daily. 1/23/23 1/23/23  Blayne Borges MD   metoprolol succinate (TOPROL-XL) 25 mg XL tablet TAKE 1 TABLET BY MOUTH EVERY DAY 12/30/22 1/23/23  Blayne Borges MD   FLUoxetine (PROzac) 10 mg tablet Take 10 mg by mouth daily. 7/18/22 1/23/23  Provider, Historical   amLODIPine (NORVASC) 10 mg tablet Take 10 mg by mouth daily.   1/23/23  Provider, Historical         Allergies   Allergen Reactions    Chlorthalidone Other (comments)     Low sodium - severe Hydrochlorothiazide Other (comments)     Low sodium    Other Medication Other (comments)     NO ACB/ARB MEDS - losartan caused high potassium         REVIEW OF SYSTEMS:  Per HPI    PHYSICAL EXAM:  Visit Vitals  BP (!) 150/90   Pulse 71   Temp 97.9 °F (36.6 °C) (Oral)   Resp 12   Ht 5' 6\" (1.676 m)   Wt 164 lb (74.4 kg)   SpO2 98%   BMI 26.47 kg/m²     Constitutional: Appears well-developed and well-nourished. No distress. HENT:   Head: Normocephalic and atraumatic. Eyes: No scleral icterus. Neck: no lad, no tm, supple   Cardiovascular: Normal S1/S2, regular rhythm. No murmurs, rubs, or gallops. Pulmonary/Chest: Effort normal and breath sounds normal. No respiratory distress. No wheezes, rhonchi, or rales. Abdomen: Soft, NT/ND, +BS, no rebound or guarding, no masses, no HSM appreciated. Ext: No edema. Neurological: Alert. Psychiatric: Normal mood and affect. Behavior is normal.     Lab Results   Component Value Date/Time    Sodium 128 (L) 09/26/2022 03:40 AM    Potassium 3.9 09/26/2022 03:40 AM    Chloride 97 09/26/2022 03:40 AM    CO2 28 09/26/2022 03:40 AM    Anion gap 3 (L) 09/26/2022 03:40 AM    Glucose 91 09/26/2022 03:40 AM    BUN 8 09/26/2022 03:40 AM    Creatinine 0.67 09/26/2022 03:40 AM    BUN/Creatinine ratio 12 09/26/2022 03:40 AM    GFR est AA >60 09/26/2022 03:40 AM    GFR est non-AA >60 09/26/2022 03:40 AM    Calcium 8.7 09/26/2022 03:40 AM    Bilirubin, total 0.3 09/24/2022 04:25 AM    Alk.  phosphatase 85 09/24/2022 04:25 AM    Protein, total 6.6 09/24/2022 04:25 AM    Albumin 4.0 09/25/2022 12:57 PM    Globulin 3.5 09/24/2022 04:25 AM    A-G Ratio 0.9 (L) 09/24/2022 04:25 AM    ALT (SGPT) 39 09/24/2022 04:25 AM     No results found for: HBA1C, POY7JHMA, FZP6PRKI   Lab Results   Component Value Date/Time    Cholesterol, total 167 09/09/2022 09:44 AM    HDL Cholesterol 46 09/09/2022 09:44 AM    LDL-CHOLESTEROL 155 (H) 04/17/2021 10:18 AM    LDL, calculated 100 (H) 09/09/2022 09:44 AM LDL, calculated 134 (H) 01/24/2018 04:41 PM    VLDL, calculated 21 09/09/2022 09:44 AM    VLDL, calculated 27 01/24/2018 04:41 PM    Triglyceride 118 09/09/2022 09:44 AM    Cholesterol/HDL ratio 4.1 04/17/2021 10:18 AM          ASSESSMENT/PLAN  Diagnoses and all orders for this visit:    1. Essential hypertension  -     metoprolol succinate (TOPROL-XL) 50 mg XL tablet; Take 1 Tablet by mouth daily. 2. Anxiety  -     FLUoxetine (PROzac) 20 mg capsule; Take 1 Capsule by mouth daily. Blood pressure is higher. Increase metoprolol to 50 mg daily. Anxiety has been a bit worse as well and will increase fluoxetine. Recommend continuing to monitor her blood pressure and also recommend DASH diet. Health Maintenance Due   Topic Date Due    Cervical cancer screen  Never done        Follow-up and Dispositions    Return in about 1 month (around 2/23/2023) for bp. Reviewed plan of care. Patient has provided input and agrees with goals. The nurse provided the patient and/or family with advanced directive information if needed and encouraged the patient to provide a copy to the office when available.

## 2023-02-23 ENCOUNTER — OFFICE VISIT (OUTPATIENT)
Dept: INTERNAL MEDICINE CLINIC | Age: 58
End: 2023-02-23
Payer: COMMERCIAL

## 2023-02-23 VITALS
DIASTOLIC BLOOD PRESSURE: 90 MMHG | HEIGHT: 66 IN | TEMPERATURE: 98.5 F | WEIGHT: 165 LBS | HEART RATE: 63 BPM | BODY MASS INDEX: 26.52 KG/M2 | RESPIRATION RATE: 12 BRPM | OXYGEN SATURATION: 96 % | SYSTOLIC BLOOD PRESSURE: 154 MMHG

## 2023-02-23 DIAGNOSIS — G57.01 PYRIFORMIS SYNDROME, RIGHT: ICD-10-CM

## 2023-02-23 DIAGNOSIS — I10 ESSENTIAL HYPERTENSION: Primary | ICD-10-CM

## 2023-02-23 DIAGNOSIS — F41.9 ANXIETY: ICD-10-CM

## 2023-02-23 PROCEDURE — 99214 OFFICE O/P EST MOD 30 MIN: CPT | Performed by: INTERNAL MEDICINE

## 2023-02-23 PROCEDURE — 3077F SYST BP >= 140 MM HG: CPT | Performed by: INTERNAL MEDICINE

## 2023-02-23 PROCEDURE — 3080F DIAST BP >= 90 MM HG: CPT | Performed by: INTERNAL MEDICINE

## 2023-02-23 RX ORDER — HYDRALAZINE HYDROCHLORIDE 25 MG/1
25 TABLET, FILM COATED ORAL 2 TIMES DAILY
Qty: 60 TABLET | Refills: 2 | Status: SHIPPED | OUTPATIENT
Start: 2023-02-23 | End: 2023-02-23 | Stop reason: SDUPTHER

## 2023-02-23 RX ORDER — HYDRALAZINE HYDROCHLORIDE 25 MG/1
25 TABLET, FILM COATED ORAL 2 TIMES DAILY
Qty: 180 TABLET | Refills: 0 | Status: SHIPPED | OUTPATIENT
Start: 2023-02-23

## 2023-02-23 NOTE — PROGRESS NOTES
Deleta Pool  Identified pt with two pt identifiers(name and ). Chief Complaint   Patient presents with    Hypertension       Reviewed record In preparation for visit and have obtained necessary documentation. 1. Have you been to the ER, urgent care clinic or hospitalized since your last visit? No     2. Have you seen or consulted any other health care providers outside of the [address] [place] [address] since your last visit? Include any pap smears or colon screening. No    Vitals reviewed with provider.     Health Maintenance reviewed:     Health Maintenance Due   Topic    Cervical cancer screen           Wt Readings from Last 3 Encounters:   23 165 lb (74.8 kg)   23 164 lb (74.4 kg)   10/12/22 152 lb 9.6 oz (69.2 kg)        Temp Readings from Last 3 Encounters:   23 98.5 °F (36.9 °C) (Oral)   23 97.9 °F (36.6 °C) (Oral)   10/12/22 97.7 °F (36.5 °C) (Oral)        BP Readings from Last 3 Encounters:   23 (!) 160/93   23 (!) 150/90   10/12/22 122/82        Pulse Readings from Last 3 Encounters:   23 63   23 71   10/12/22 83        Vitals:    23 1358   BP: (!) 160/93   Pulse: 63   Resp: 12   Temp: 98.5 °F (36.9 °C)   TempSrc: Oral   SpO2: 96%   Weight: 165 lb (74.8 kg)   Height: 5' 6\" (1.676 m)   PainSc:   0 - No pain          Learning Assessment:   :       Learning Assessment 3/1/2022   PRIMARY LEARNER Patient   HIGHEST LEVEL OF EDUCATION - PRIMARY LEARNER  SOME COLLEGE   BARRIERS PRIMARY LEARNER NONE   PRIMARY LANGUAGE ENGLISH   LEARNER PREFERENCE PRIMARY READING   ANSWERED BY Patient   RELATIONSHIP SELF        Depression Screening:   :       3 most recent PHQ Screens 10/12/2022   Little interest or pleasure in doing things Not at all   Feeling down, depressed, irritable, or hopeless Not at all   Total Score PHQ 2 0   Trouble falling or staying asleep, or sleeping too much -   Feeling tired or having little energy -   Poor appetite, weight loss, or overeating -   Feeling bad about yourself - or that you are a failure or have let yourself or your family down -   Trouble concentrating on things such as school, work, reading, or watching TV -   Moving or speaking so slowly that other people could have noticed; or the opposite being so fidgety that others notice -   Thoughts of being better off dead, or hurting yourself in some way -   PHQ 9 Score -        Fall Risk Assessment:   :       Fall Risk Assessment, last 12 mths 3/4/2021   Able to walk? Yes   Fall in past 12 months? 0   Do you feel unsteady? 0   Are you worried about falling 0        Abuse Screening:   :       Abuse Screening Questionnaire 11/26/2018   Do you ever feel afraid of your partner? N   Are you in a relationship with someone who physically or mentally threatens you? N   Is it safe for you to go home?  Y        ADL Screening:   :       ADL Assessment 11/26/2018   Feeding yourself No Help Needed   Getting from bed to chair No Help Needed   Getting dressed No Help Needed   Bathing or showering No Help Needed   Walk across the room (includes cane/walker) No Help Needed   Using the telphone No Help Needed   Taking your medications No Help Needed   Preparing meals No Help Needed   Managing money (expenses/bills) No Help Needed   Moderately strenuous housework (laundry) No Help Needed   Shopping for personal items (toiletries/medicines) No Help Needed   Shopping for groceries No Help Needed   Driving No Help Needed   Climbing a flight of stairs No Help Needed   Getting to places beyond walking distances No Help Needed

## 2023-02-23 NOTE — PROGRESS NOTES
HPI  Ms. Johanna Garcia is a 59y.o. year old female, she is seen today for follow-up hypertension and anxiety. Plan last visit:  Blood pressure is higher. Increase metoprolol to 50 mg daily. Anxiety has been a bit worse as well and will increase fluoxetine. Recommend continuing to monitor her blood pressure and also recommend DASH diet. BP at home has been 145-178/, HR 59-67    No chest pain, sob, dizziness, weakness or unusual fatigue. Amlodipine caused ankle edema. Increased fluoxetine to 20mg daily which hasn't helped anxiety - not better or worse. Complains of right low back pain to buttock, worse at night when lying down - off and on for years but worse for the past 2 weeks after vacuuming. Ice not helping. Massager and tyelnol helps some. Chief Complaint   Patient presents with    Hypertension        Prior to Admission medications    Medication Sig Start Date End Date Taking? Authorizing Provider   hydrALAZINE (APRESOLINE) 25 mg tablet Take 1 Tablet by mouth two (2) times a day. 2/23/23 2/23/23 Yes Bishop Roland MD   FLUoxetine (PROzac) 20 mg capsule Take 1 Capsule by mouth daily. 1/23/23  Yes Bishop Roland MD   metoprolol succinate (TOPROL-XL) 50 mg XL tablet Take 1 Tablet by mouth daily. 1/23/23  Yes Bishop Roland MD   atorvastatin (LIPITOR) 20 mg tablet TAKE 1 TABLET BY MOUTH EVERY DAY 10/29/22  Yes Bishop Roland MD   aspirin delayed-release 81 mg tablet Take 81 mg by mouth. Yes Provider, Historical   trospium (SANCTURA) 20 mg tablet TK 1 T PO BID 10/6/20  Yes Provider, Historical   calcium-cholecalciferol, D3, (CALTRATE 600+D) tablet Take 1 Tab by mouth daily. Yes Provider, Historical   hydrALAZINE (APRESOLINE) 25 mg tablet Take 1 Tablet by mouth two (2) times a day.  2/23/23   Bishop Roland MD         Allergies   Allergen Reactions    Chlorthalidone Other (comments)     Low sodium - severe    Hydrochlorothiazide Other (comments) Low sodium    Other Medication Other (comments)     NO ACB/ARB MEDS - losartan caused high potassium         REVIEW OF SYSTEMS:  Per HPI    PHYSICAL EXAM:  Visit Vitals  BP (!) 154/90   Pulse 63   Temp 98.5 °F (36.9 °C) (Oral)   Resp 12   Ht 5' 6\" (1.676 m)   Wt 165 lb (74.8 kg)   SpO2 96%   BMI 26.63 kg/m²     Constitutional: Appears well-developed and well-nourished. No distress. HENT:   Head: Normocephalic and atraumatic. Eyes: No scleral icterus. Cardiovascular: Normal S1/S2, regular rhythm. No murmurs, rubs, or gallops. Pulmonary/Chest: Effort normal and breath sounds normal. No respiratory distress. No wheezes, rhonchi, or rales. Abdomen: Soft, NT/ND, +BS, no rebound or guarding, no masses, no HSM appreciated. Back: no pain on palpation spine  Ext: No edema. Neurological: Alert. Strength 5/5 b/l LE, SLR neg b/l  Right hip: +pain on palpation over greater trochanter, piriformis muscle  Psychiatric: Normal mood and affect. Behavior is normal.     Lab Results   Component Value Date/Time    Sodium 128 (L) 09/26/2022 03:40 AM    Potassium 3.9 09/26/2022 03:40 AM    Chloride 97 09/26/2022 03:40 AM    CO2 28 09/26/2022 03:40 AM    Anion gap 3 (L) 09/26/2022 03:40 AM    Glucose 91 09/26/2022 03:40 AM    BUN 8 09/26/2022 03:40 AM    Creatinine 0.67 09/26/2022 03:40 AM    BUN/Creatinine ratio 12 09/26/2022 03:40 AM    GFR est AA >60 09/26/2022 03:40 AM    GFR est non-AA >60 09/26/2022 03:40 AM    Calcium 8.7 09/26/2022 03:40 AM    Bilirubin, total 0.3 09/24/2022 04:25 AM    Alk.  phosphatase 85 09/24/2022 04:25 AM    Protein, total 6.6 09/24/2022 04:25 AM    Albumin 4.0 09/25/2022 12:57 PM    Globulin 3.5 09/24/2022 04:25 AM    A-G Ratio 0.9 (L) 09/24/2022 04:25 AM    ALT (SGPT) 39 09/24/2022 04:25 AM     No results found for: HBA1C, ADE7CDON, [id number]   Lab Results   Component Value Date/Time    Cholesterol, total 167 09/09/2022 09:44 AM    HDL Cholesterol 46 09/09/2022 09:44 AM    LDL-CHOLESTEROL 155 (H) 04/17/2021 10:18 AM    LDL, calculated 100 (H) 09/09/2022 09:44 AM    LDL, calculated 134 (H) 01/24/2018 04:41 PM    VLDL, calculated 21 09/09/2022 09:44 AM    VLDL, calculated 27 01/24/2018 04:41 PM    Triglyceride 118 09/09/2022 09:44 AM    Cholesterol/HDL ratio 4.1 04/17/2021 10:18 AM          ASSESSMENT/PLAN  Diagnoses and all orders for this visit:    1. Essential hypertension    2. Anxiety    3. Pyriformis syndrome, right    Anxiety could be exacerbated by hypertension. Treat hypertension first by adding hydralazine 25 mg twice daily and continue fluoxetine 20 mg daily. Stretches given for piriformis pain    Health Maintenance Due   Topic Date Due    Cervical cancer screen  Never done        Follow-up and Dispositions    Return in about 1 month (around 3/23/2023) for bp, anxiety. Reviewed plan of care. Patient has provided input and agrees with goals. The nurse provided the patient and/or family with advanced directive information if needed and encouraged the patient to provide a copy to the office when available.

## 2023-02-23 NOTE — TELEPHONE ENCOUNTER
Patient is requesting a 90 day supply due to insurance. PCP: Sheri Ramirez MD     Last appt: 2/23/2023     Future Appointments   Date Time Provider Aristides Vazquez   3/23/2023 11:00 AM MD MARIAA Martin   4/11/2023  1:15 PM MD MARIAA Martin          Requested Prescriptions     Pending Prescriptions Disp Refills    hydrALAZINE (APRESOLINE) 25 mg tablet 180 Tablet 0     Sig: Take 1 Tablet by mouth two (2) times a day.

## 2023-03-23 ENCOUNTER — OFFICE VISIT (OUTPATIENT)
Dept: INTERNAL MEDICINE CLINIC | Age: 58
End: 2023-03-23
Payer: COMMERCIAL

## 2023-03-23 VITALS
WEIGHT: 164.5 LBS | RESPIRATION RATE: 12 BRPM | OXYGEN SATURATION: 98 % | HEART RATE: 75 BPM | TEMPERATURE: 98.9 F | HEIGHT: 66 IN | BODY MASS INDEX: 26.44 KG/M2 | SYSTOLIC BLOOD PRESSURE: 128 MMHG | DIASTOLIC BLOOD PRESSURE: 70 MMHG

## 2023-03-23 DIAGNOSIS — R32 URINARY INCONTINENCE IN FEMALE: ICD-10-CM

## 2023-03-23 DIAGNOSIS — I10 ESSENTIAL HYPERTENSION: Primary | ICD-10-CM

## 2023-03-23 DIAGNOSIS — F41.9 ANXIETY: ICD-10-CM

## 2023-03-23 PROCEDURE — 3078F DIAST BP <80 MM HG: CPT | Performed by: INTERNAL MEDICINE

## 2023-03-23 PROCEDURE — 99213 OFFICE O/P EST LOW 20 MIN: CPT | Performed by: INTERNAL MEDICINE

## 2023-03-23 PROCEDURE — 3074F SYST BP LT 130 MM HG: CPT | Performed by: INTERNAL MEDICINE

## 2023-03-23 RX ORDER — TROSPIUM CHLORIDE ER 60 MG/1
60 CAPSULE ORAL DAILY
Qty: 90 CAPSULE | Refills: 3 | Status: SHIPPED | OUTPATIENT
Start: 2023-03-23

## 2023-03-23 RX ORDER — TROSPIUM CHLORIDE ER 60 MG/1
60 CAPSULE ORAL DAILY
COMMUNITY
Start: 2022-12-30 | End: 2023-03-23 | Stop reason: SDUPTHER

## 2023-03-23 NOTE — PROGRESS NOTES
Lawyer Vogel  Identified pt with two pt identifiers(name and ). Chief Complaint   Patient presents with    Hypertension    Anxiety       Reviewed record In preparation for visit and have obtained necessary documentation. 1. Have you been to the ER, urgent care clinic or hospitalized since your last visit? No     2. Have you seen or consulted any other health care providers outside of the [address] [place] [address] since your last visit? Include any pap smears or colon screening. No    Vitals reviewed with provider.     Health Maintenance reviewed:     Health Maintenance Due   Topic    DTaP/Tdap/Td series (1 - Tdap)    Cervical cancer screen     Shingles Vaccine (1 of 2)          Wt Readings from Last 3 Encounters:   23 164 lb 8 oz (74.6 kg)   23 165 lb (74.8 kg)   23 164 lb (74.4 kg)        Temp Readings from Last 3 Encounters:   23 98.9 °F (37.2 °C) (Oral)   23 98.5 °F (36.9 °C) (Oral)   23 97.9 °F (36.6 °C) (Oral)        BP Readings from Last 3 Encounters:   23 (!) 143/85   23 (!) 154/90   23 (!) 150/90        Pulse Readings from Last 3 Encounters:   23 75   23 63   23 71        Vitals:    23 1109   BP: (!) 143/85   Pulse: 75   Resp: 12   Temp: 98.9 °F (37.2 °C)   TempSrc: Oral   SpO2: 98%   Weight: 164 lb 8 oz (74.6 kg)   Height: 5' 6\" (1.676 m)   PainSc:   0 - No pain          Learning Assessment:   :       Learning Assessment 3/1/2022   PRIMARY LEARNER Patient   HIGHEST LEVEL OF EDUCATION - PRIMARY LEARNER  SOME COLLEGE   BARRIERS PRIMARY LEARNER NONE   PRIMARY LANGUAGE ENGLISH   LEARNER PREFERENCE PRIMARY READING   ANSWERED BY Patient   RELATIONSHIP SELF        Depression Screening:   :       3 most recent PHQ Screens 10/12/2022   Little interest or pleasure in doing things Not at all   Feeling down, depressed, irritable, or hopeless Not at all   Total Score PHQ 2 0   Trouble falling or staying asleep, or sleeping too much -   Feeling tired or having little energy -   Poor appetite, weight loss, or overeating -   Feeling bad about yourself - or that you are a failure or have let yourself or your family down -   Trouble concentrating on things such as school, work, reading, or watching TV -   Moving or speaking so slowly that other people could have noticed; or the opposite being so fidgety that others notice -   Thoughts of being better off dead, or hurting yourself in some way -   PHQ 9 Score -        Fall Risk Assessment:   :       Fall Risk Assessment, last 12 mths 3/4/2021   Able to walk? Yes   Fall in past 12 months? 0   Do you feel unsteady? 0   Are you worried about falling 0        Abuse Screening:   :       Abuse Screening Questionnaire 11/26/2018   Do you ever feel afraid of your partner? N   Are you in a relationship with someone who physically or mentally threatens you? N   Is it safe for you to go home?  Y        ADL Screening:   :       ADL Assessment 11/26/2018   Feeding yourself No Help Needed   Getting from bed to chair No Help Needed   Getting dressed No Help Needed   Bathing or showering No Help Needed   Walk across the room (includes cane/walker) No Help Needed   Using the telphone No Help Needed   Taking your medications No Help Needed   Preparing meals No Help Needed   Managing money (expenses/bills) No Help Needed   Moderately strenuous housework (laundry) No Help Needed   Shopping for personal items (toiletries/medicines) No Help Needed   Shopping for groceries No Help Needed   Driving No Help Needed   Climbing a flight of stairs No Help Needed   Getting to places beyond walking distances No Help Needed

## 2023-03-23 NOTE — PROGRESS NOTES
HPI  Ms. Lucrecia Jones is a 59y.o. year old female, she is seen today for follow up HTN, anxiety. Plan last visit:  Anxiety could be exacerbated by hypertension. Treat hypertension first by adding hydralazine 25 mg twice daily and continue fluoxetine 20 mg daily. Stretches given for piriformis pain    Says BP has been better. Has been taking hydralazine 25mg bid with toprol 50mg daily and BP has been 140s/80s. Anxiety is much better - not stressed, down or depressed. Chief Complaint   Patient presents with    Hypertension    Anxiety        Prior to Admission medications    Medication Sig Start Date End Date Taking? Authorizing Provider   trospium (SANCTURA XL) 60 mg capsule Take 1 Capsule by mouth daily. 3/23/23  Yes Jeane Schmitt MD   hydrALAZINE (APRESOLINE) 25 mg tablet Take 1 Tablet by mouth two (2) times a day. 2/23/23  Yes Jeane Schmitt MD   FLUoxetine (PROzac) 20 mg capsule Take 1 Capsule by mouth daily. 1/23/23  Yes Jeane Schmitt MD   metoprolol succinate (TOPROL-XL) 50 mg XL tablet Take 1 Tablet by mouth daily. 1/23/23  Yes Jeane Schmitt MD   atorvastatin (LIPITOR) 20 mg tablet TAKE 1 TABLET BY MOUTH EVERY DAY 10/29/22  Yes Jeane Schmitt MD   aspirin delayed-release 81 mg tablet Take 81 mg by mouth. Yes Provider, Historical   calcium-cholecalciferol, D3, (CALTRATE 600+D) tablet Take 1 Tab by mouth daily. Yes Provider, Historical   trospium (SANCTURA XL) 60 mg capsule Take 60 mg by mouth daily. 12/30/22 3/23/23  Provider, Historical   trospium (SANCTURA) 20 mg tablet Take 20 mg by mouth daily.  10/6/20 3/23/23  Provider, Historical         Allergies   Allergen Reactions    Chlorthalidone Other (comments)     Low sodium - severe    Hydrochlorothiazide Other (comments)     Low sodium    Other Medication Other (comments)     NO ACB/ARB MEDS - losartan caused high potassium         REVIEW OF SYSTEMS:  Per HPI    PHYSICAL EXAM:  Visit Vitals  /70   Pulse 75 Temp 98.9 °F (37.2 °C) (Oral)   Resp 12   Ht 5' 6\" (1.676 m)   Wt 164 lb 8 oz (74.6 kg)   SpO2 98%   BMI 26.55 kg/m²     Constitutional: Appears well-developed and well-nourished. No distress. HENT:   Head: Normocephalic and atraumatic. Eyes: No scleral icterus. Neck: no lad, no tm, supple   Cardiovascular: Normal S1/S2, regular rhythm. No murmurs, rubs, or gallops. Pulmonary/Chest: Effort normal and breath sounds normal. No respiratory distress. No wheezes, rhonchi, or rales. Ext: No edema. Neurological: Alert. Psychiatric: Normal mood and affect. Behavior is normal.     Lab Results   Component Value Date/Time    Sodium 128 (L) 09/26/2022 03:40 AM    Potassium 3.9 09/26/2022 03:40 AM    Chloride 97 09/26/2022 03:40 AM    CO2 28 09/26/2022 03:40 AM    Anion gap 3 (L) 09/26/2022 03:40 AM    Glucose 91 09/26/2022 03:40 AM    BUN 8 09/26/2022 03:40 AM    Creatinine 0.67 09/26/2022 03:40 AM    BUN/Creatinine ratio 12 09/26/2022 03:40 AM    GFR est AA >60 09/26/2022 03:40 AM    GFR est non-AA >60 09/26/2022 03:40 AM    Calcium 8.7 09/26/2022 03:40 AM    Bilirubin, total 0.3 09/24/2022 04:25 AM    Alk. phosphatase 85 09/24/2022 04:25 AM    Protein, total 6.6 09/24/2022 04:25 AM    Albumin 4.0 09/25/2022 12:57 PM    Globulin 3.5 09/24/2022 04:25 AM    A-G Ratio 0.9 (L) 09/24/2022 04:25 AM    ALT (SGPT) 39 09/24/2022 04:25 AM     No results found for: HBA1C, BLI1LLAK, LBR7EVHO   Lab Results   Component Value Date/Time    Cholesterol, total 167 09/09/2022 09:44 AM    HDL Cholesterol 46 09/09/2022 09:44 AM    LDL-CHOLESTEROL 155 (H) 04/17/2021 10:18 AM    LDL, calculated 100 (H) 09/09/2022 09:44 AM    LDL, calculated 134 (H) 01/24/2018 04:41 PM    VLDL, calculated 21 09/09/2022 09:44 AM    VLDL, calculated 27 01/24/2018 04:41 PM    Triglyceride 118 09/09/2022 09:44 AM    Cholesterol/HDL ratio 4.1 04/17/2021 10:18 AM          ASSESSMENT/PLAN  Diagnoses and all orders for this visit:    1.  Essential hypertension    2. Anxiety    3. Urinary incontinence in female    Other orders  -     trospium (SANCTURA XL) 60 mg capsule; Take 1 Capsule by mouth daily. BP at goal - continue current medications   Anxiety controlled - continue fluoxetine  OAB managed with sanctura - continue    Health Maintenance Due   Topic Date Due    DTaP/Tdap/Td series (1 - Tdap) Never done    Cervical cancer screen  Never done    Shingles Vaccine (1 of 2) Never done        Follow-up and Dispositions    Return in about 6 months (around 9/23/2023) for bp, chol, labs prior. Reviewed plan of care. Patient has provided input and agrees with goals. The nurse provided the patient and/or family with advanced directive information if needed and encouraged the patient to provide a copy to the office when available.

## 2023-04-21 DIAGNOSIS — E78.2 MIXED HYPERLIPIDEMIA: Primary | ICD-10-CM

## 2023-04-23 DIAGNOSIS — E78.2 MIXED HYPERLIPIDEMIA: Primary | ICD-10-CM

## 2023-04-24 DIAGNOSIS — E78.2 MIXED HYPERLIPIDEMIA: ICD-10-CM

## 2023-04-24 RX ORDER — ATORVASTATIN CALCIUM 20 MG/1
TABLET, FILM COATED ORAL
Qty: 90 TABLET | Refills: 1 | Status: SHIPPED | OUTPATIENT
Start: 2023-04-24

## 2023-08-11 RX ORDER — HYDRALAZINE HYDROCHLORIDE 25 MG/1
TABLET, FILM COATED ORAL
Qty: 180 TABLET | Refills: 1 | Status: SHIPPED | OUTPATIENT
Start: 2023-08-11

## 2023-08-18 RX ORDER — FLUOXETINE HYDROCHLORIDE 20 MG/1
CAPSULE ORAL
Qty: 90 CAPSULE | Refills: 1 | Status: SHIPPED | OUTPATIENT
Start: 2023-08-18

## 2023-08-18 NOTE — TELEPHONE ENCOUNTER
PCP: Martinez Mars MD     Last appt:  3/23/2023      Future Appointments   Date Time Provider [address]   9/28/2023 10:30 AM Martinez Mars MD [facility] AMB          Requested Prescriptions     Pending Prescriptions Disp Refills    FLUoxetine (PROZAC) 20 MG capsule [Pharmacy Med Name: FLUOXETINE HCL 20 MG CAPSULE] 90 capsule 1     Sig: TAKE 1 CAPSULE BY MOUTH EVERY DAY

## 2023-09-23 LAB
ALBUMIN SERPL-MCNC: 4.3 G/DL (ref 3.8–4.9)
ALBUMIN/GLOB SERPL: 1.3 {RATIO} (ref 1.2–2.2)
ALP SERPL-CCNC: 84 IU/L (ref 44–121)
ALT SERPL-CCNC: 22 IU/L (ref 0–32)
AST SERPL-CCNC: 25 IU/L (ref 0–40)
BILIRUB SERPL-MCNC: 0.4 MG/DL (ref 0–1.2)
BUN SERPL-MCNC: 6 MG/DL (ref 6–24)
BUN/CREAT SERPL: 7 (ref 9–23)
CALCIUM SERPL-MCNC: 9.8 MG/DL (ref 8.7–10.2)
CHLORIDE SERPL-SCNC: 96 MMOL/L (ref 96–106)
CHOLEST SERPL-MCNC: 150 MG/DL (ref 100–199)
CO2 SERPL-SCNC: 25 MMOL/L (ref 20–29)
CREAT SERPL-MCNC: 0.91 MG/DL (ref 0.57–1)
EGFRCR SERPLBLD CKD-EPI 2021: 73 ML/MIN/1.73
GLOBULIN SER CALC-MCNC: 3.2 G/DL (ref 1.5–4.5)
GLUCOSE SERPL-MCNC: 99 MG/DL (ref 70–99)
HDLC SERPL-MCNC: 57 MG/DL
LDLC SERPL CALC-MCNC: 80 MG/DL (ref 0–99)
POTASSIUM SERPL-SCNC: 5.9 MMOL/L (ref 3.5–5.2)
PROT SERPL-MCNC: 7.5 G/DL (ref 6–8.5)
SODIUM SERPL-SCNC: 136 MMOL/L (ref 134–144)
TRIGL SERPL-MCNC: 62 MG/DL (ref 0–149)
VLDLC SERPL CALC-MCNC: 13 MG/DL (ref 5–40)

## 2023-09-24 DIAGNOSIS — E87.5 HYPERKALEMIA: Primary | ICD-10-CM

## 2023-09-24 DIAGNOSIS — E87.5 HYPERKALEMIA: ICD-10-CM

## 2023-09-28 ENCOUNTER — OFFICE VISIT (OUTPATIENT)
Facility: CLINIC | Age: 58
End: 2023-09-28
Payer: COMMERCIAL

## 2023-09-28 VITALS
SYSTOLIC BLOOD PRESSURE: 125 MMHG | RESPIRATION RATE: 16 BRPM | OXYGEN SATURATION: 96 % | BODY MASS INDEX: 27.64 KG/M2 | TEMPERATURE: 98 F | WEIGHT: 172 LBS | HEART RATE: 74 BPM | HEIGHT: 66 IN | DIASTOLIC BLOOD PRESSURE: 79 MMHG

## 2023-09-28 DIAGNOSIS — E87.1 HYPONATREMIA: ICD-10-CM

## 2023-09-28 DIAGNOSIS — E87.5 HYPERKALEMIA: ICD-10-CM

## 2023-09-28 DIAGNOSIS — F41.9 ANXIETY: ICD-10-CM

## 2023-09-28 DIAGNOSIS — I10 ESSENTIAL HYPERTENSION: Primary | ICD-10-CM

## 2023-09-28 PROCEDURE — 3078F DIAST BP <80 MM HG: CPT | Performed by: INTERNAL MEDICINE

## 2023-09-28 PROCEDURE — 99214 OFFICE O/P EST MOD 30 MIN: CPT | Performed by: INTERNAL MEDICINE

## 2023-09-28 PROCEDURE — 3074F SYST BP LT 130 MM HG: CPT | Performed by: INTERNAL MEDICINE

## 2023-09-28 ASSESSMENT — PATIENT HEALTH QUESTIONNAIRE - PHQ9
SUM OF ALL RESPONSES TO PHQ QUESTIONS 1-9: 1
SUM OF ALL RESPONSES TO PHQ9 QUESTIONS 1 & 2: 1
2. FEELING DOWN, DEPRESSED OR HOPELESS: 1
1. LITTLE INTEREST OR PLEASURE IN DOING THINGS: 0

## 2023-09-29 LAB — POTASSIUM SERPL-SCNC: 5.6 MMOL/L (ref 3.5–5.2)

## 2023-10-02 DIAGNOSIS — E87.5 HYPERKALEMIA: Primary | ICD-10-CM

## 2023-10-02 RX ORDER — FUROSEMIDE 20 MG/1
20 TABLET ORAL DAILY
Qty: 7 TABLET | Refills: 0 | Status: SHIPPED | OUTPATIENT
Start: 2023-10-02 | End: 2023-10-09

## 2023-10-21 DIAGNOSIS — E78.2 MIXED HYPERLIPIDEMIA: ICD-10-CM

## 2023-10-23 RX ORDER — ATORVASTATIN CALCIUM 20 MG/1
TABLET, FILM COATED ORAL
Qty: 90 TABLET | Refills: 1 | Status: SHIPPED | OUTPATIENT
Start: 2023-10-23

## 2023-11-10 LAB
BUN SERPL-MCNC: 9 MG/DL (ref 6–24)
BUN/CREAT SERPL: 11 (ref 9–23)
CALCIUM SERPL-MCNC: 9.9 MG/DL (ref 8.7–10.2)
CHLORIDE SERPL-SCNC: 97 MMOL/L (ref 96–106)
CO2 SERPL-SCNC: 24 MMOL/L (ref 20–29)
CREAT SERPL-MCNC: 0.85 MG/DL (ref 0.57–1)
EGFRCR SERPLBLD CKD-EPI 2021: 79 ML/MIN/1.73
GLUCOSE SERPL-MCNC: 113 MG/DL (ref 70–99)
POTASSIUM SERPL-SCNC: 5.6 MMOL/L (ref 3.5–5.2)
SODIUM SERPL-SCNC: 137 MMOL/L (ref 134–144)

## 2023-12-26 RX ORDER — METOPROLOL SUCCINATE 50 MG/1
50 TABLET, EXTENDED RELEASE ORAL DAILY
Qty: 90 TABLET | Refills: 1 | Status: SHIPPED | OUTPATIENT
Start: 2023-12-26

## 2023-12-26 NOTE — TELEPHONE ENCOUNTER
PCP: Yash Stokes MD     Last appt:  9/28/2023      Future Appointments   Date Time Provider [address]   3/29/2024 12:00 PM Yash Stokes MD [facility] AMB          Requested Prescriptions     Pending Prescriptions Disp Refills    metoprolol succinate (TOPROL XL) 50 MG extended release tablet [Pharmacy Med Name: METOPROLOL SUCC ER 50 MG TAB] 90 tablet 1     Sig: TAKE 1 TABLET BY MOUTH EVERY DAY

## 2024-01-04 ENCOUNTER — TELEPHONE (OUTPATIENT)
Facility: CLINIC | Age: 59
End: 2024-01-04

## 2024-01-04 RX ORDER — SODIUM ZIRCONIUM CYCLOSILICATE 10 G/10G
10 POWDER, FOR SUSPENSION ORAL DAILY
COMMUNITY

## 2024-01-04 NOTE — TELEPHONE ENCOUNTER
Pt was seen at [facility] on 01/03 for a UTI, urine was positive for sugar. On no medications to explain, appointment given for 01/08.

## 2024-01-04 NOTE — TELEPHONE ENCOUNTER
Pt went to urgent care today for UTI and was told she had sugar in her urine and was told to fu with pcp, she would like to know what she needs to do

## 2024-01-08 ENCOUNTER — OFFICE VISIT (OUTPATIENT)
Facility: CLINIC | Age: 59
End: 2024-01-08
Payer: COMMERCIAL

## 2024-01-08 VITALS
OXYGEN SATURATION: 97 % | RESPIRATION RATE: 12 BRPM | SYSTOLIC BLOOD PRESSURE: 168 MMHG | DIASTOLIC BLOOD PRESSURE: 90 MMHG | HEIGHT: 66 IN | HEART RATE: 90 BPM | WEIGHT: 170 LBS | BODY MASS INDEX: 27.32 KG/M2 | TEMPERATURE: 97.8 F

## 2024-01-08 DIAGNOSIS — E87.5 HYPERKALEMIA: ICD-10-CM

## 2024-01-08 DIAGNOSIS — R81 GLUCOSURIA: ICD-10-CM

## 2024-01-08 DIAGNOSIS — K59.00 CONSTIPATION, UNSPECIFIED CONSTIPATION TYPE: ICD-10-CM

## 2024-01-08 DIAGNOSIS — I10 ESSENTIAL HYPERTENSION: Primary | ICD-10-CM

## 2024-01-08 LAB
BILIRUBIN, URINE, POC: NEGATIVE
BLOOD URINE, POC: NORMAL
GLUCOSE URINE, POC: NEGATIVE
HBA1C MFR BLD: 5.7 %
KETONES, URINE, POC: NEGATIVE
LEUKOCYTE ESTERASE, URINE, POC: NEGATIVE
NITRITE, URINE, POC: NEGATIVE
PH, URINE, POC: 6.5 (ref 4.6–8)
PROTEIN,URINE, POC: NEGATIVE
SPECIFIC GRAVITY, URINE, POC: 1 (ref 1–1.03)
URINALYSIS CLARITY, POC: CLEAR
URINALYSIS COLOR, POC: NORMAL
UROBILINOGEN, POC: NORMAL

## 2024-01-08 PROCEDURE — 99214 OFFICE O/P EST MOD 30 MIN: CPT | Performed by: INTERNAL MEDICINE

## 2024-01-08 PROCEDURE — 81003 URINALYSIS AUTO W/O SCOPE: CPT | Performed by: INTERNAL MEDICINE

## 2024-01-08 PROCEDURE — 83036 HEMOGLOBIN GLYCOSYLATED A1C: CPT | Performed by: INTERNAL MEDICINE

## 2024-01-08 PROCEDURE — 3080F DIAST BP >= 90 MM HG: CPT | Performed by: INTERNAL MEDICINE

## 2024-01-08 PROCEDURE — 3077F SYST BP >= 140 MM HG: CPT | Performed by: INTERNAL MEDICINE

## 2024-01-08 RX ORDER — AMOXICILLIN 250 MG
2 CAPSULE ORAL DAILY
Qty: 180 TABLET | Refills: 0 | Status: SHIPPED | OUTPATIENT
Start: 2024-01-08

## 2024-01-08 RX ORDER — HYDRALAZINE HYDROCHLORIDE 50 MG/1
50 TABLET, FILM COATED ORAL 2 TIMES DAILY
COMMUNITY
Start: 2023-12-11

## 2024-01-08 RX ORDER — TROSPIUM CHLORIDE ER 60 MG/1
60 CAPSULE ORAL DAILY
COMMUNITY
Start: 2023-12-25

## 2024-01-08 RX ORDER — DILTIAZEM HYDROCHLORIDE 120 MG/1
120 CAPSULE, COATED, EXTENDED RELEASE ORAL DAILY
Qty: 90 CAPSULE | Refills: 0 | Status: SHIPPED | OUTPATIENT
Start: 2024-01-08

## 2024-01-08 SDOH — ECONOMIC STABILITY: FOOD INSECURITY: WITHIN THE PAST 12 MONTHS, THE FOOD YOU BOUGHT JUST DIDN'T LAST AND YOU DIDN'T HAVE MONEY TO GET MORE.: NEVER TRUE

## 2024-01-08 SDOH — ECONOMIC STABILITY: FOOD INSECURITY: WITHIN THE PAST 12 MONTHS, YOU WORRIED THAT YOUR FOOD WOULD RUN OUT BEFORE YOU GOT MONEY TO BUY MORE.: NEVER TRUE

## 2024-01-08 SDOH — ECONOMIC STABILITY: HOUSING INSECURITY
IN THE LAST 12 MONTHS, WAS THERE A TIME WHEN YOU DID NOT HAVE A STEADY PLACE TO SLEEP OR SLEPT IN A SHELTER (INCLUDING NOW)?: NO

## 2024-01-08 SDOH — ECONOMIC STABILITY: INCOME INSECURITY: HOW HARD IS IT FOR YOU TO PAY FOR THE VERY BASICS LIKE FOOD, HOUSING, MEDICAL CARE, AND HEATING?: NOT HARD AT ALL

## 2024-01-08 ASSESSMENT — PATIENT HEALTH QUESTIONNAIRE - PHQ9
SUM OF ALL RESPONSES TO PHQ QUESTIONS 1-9: 1
SUM OF ALL RESPONSES TO PHQ QUESTIONS 1-9: 1
2. FEELING DOWN, DEPRESSED OR HOPELESS: 1
SUM OF ALL RESPONSES TO PHQ9 QUESTIONS 1 & 2: 1
SUM OF ALL RESPONSES TO PHQ QUESTIONS 1-9: 1
SUM OF ALL RESPONSES TO PHQ QUESTIONS 1-9: 1
1. LITTLE INTEREST OR PLEASURE IN DOING THINGS: 0

## 2024-01-08 NOTE — PROGRESS NOTES
Una Robison  Identified pt with two pt identifiers(name and ).     Chief Complaint   Patient presents with    sugar urine       Reviewed record In preparation for visit and have obtained necessary documentation.     1. Have you been to the ER, urgent care clinic or hospitalized since your last visit? No     2. Have you seen or consulted any other health care providers outside of the [facility] since your last visit? Include any pap smears or colon screening. No    Vitals reviewed with provider.    Health Maintenance reviewed:     Health Maintenance Due   Topic    HIV screen     DTaP/Tdap/Td vaccine (1 - Tdap)    Diabetes screen     Shingles vaccine (1 of 2)    COVID-19 Vaccine (3 -  season)    Respiratory Syncytial Virus (RSV) Pregnant or age 60 yrs+ (1 - 1-dose 60+ series)          Wt Readings from Last 3 Encounters:   23 78 kg (172 lb)   23 74.6 kg (164 lb 8 oz)   23 74.8 kg (165 lb)        Temp Readings from Last 3 Encounters:   23 98 °F (36.7 °C) (Oral)        BP Readings from Last 3 Encounters:   23 125/79   23 128/70   23 (!) 154/90        Pulse Readings from Last 3 Encounters:   23 74   23 75   23 63      [unfilled]       Learning Assessment:   :         3/1/2022    12:00 AM   [facility] AMB LEARNING ASSESSMENT   Primary Learner Patient   level of education SOME COLLEGE   Barriers Factors NONE   Primary Language ENGLISH   Learning Preference READING   Answered By Patient   Relationship to Learner SELF         Fall Risk Assessment:   :         2022     9:15 AM 2022     8:35 PM 2022     7:56 AM 2022     4:29 AM 2022     8:53 PM 2022     6:00 PM 2022     8:00 AM   Amb Fall Risk Assessment and TUG Test   Total Score 1 1 1 1 1 1 1          Abuse Screening:   :          No data to display                   ADL Screening:   :          No data to display               
   Shingles vaccine (1 of 2) Never done    COVID-19 Vaccine (3 - 2023-24 season) 09/01/2023    Respiratory Syncytial Virus (RSV) Pregnant or age 60 yrs+ (1 - 1-dose 60+ series) Never done               Reviewed plan of care. Patient has provided input and agrees with goals.     The nurse provided the patient and/or family with advanced directive information if needed and encouraged the patient to provide a copy to the office when available.

## 2024-03-29 ENCOUNTER — OFFICE VISIT (OUTPATIENT)
Facility: CLINIC | Age: 59
End: 2024-03-29
Payer: COMMERCIAL

## 2024-03-29 VITALS
HEIGHT: 66 IN | OXYGEN SATURATION: 98 % | SYSTOLIC BLOOD PRESSURE: 138 MMHG | HEART RATE: 92 BPM | RESPIRATION RATE: 17 BRPM | DIASTOLIC BLOOD PRESSURE: 82 MMHG | WEIGHT: 172 LBS | TEMPERATURE: 97.3 F | BODY MASS INDEX: 27.64 KG/M2

## 2024-03-29 DIAGNOSIS — F32.A ANXIETY AND DEPRESSION: Primary | ICD-10-CM

## 2024-03-29 DIAGNOSIS — F41.9 ANXIETY AND DEPRESSION: Primary | ICD-10-CM

## 2024-03-29 DIAGNOSIS — I10 ESSENTIAL HYPERTENSION: ICD-10-CM

## 2024-03-29 DIAGNOSIS — N32.81 OAB (OVERACTIVE BLADDER): ICD-10-CM

## 2024-03-29 DIAGNOSIS — E78.2 MIXED HYPERLIPIDEMIA: ICD-10-CM

## 2024-03-29 PROCEDURE — 3075F SYST BP GE 130 - 139MM HG: CPT | Performed by: INTERNAL MEDICINE

## 2024-03-29 PROCEDURE — 3079F DIAST BP 80-89 MM HG: CPT | Performed by: INTERNAL MEDICINE

## 2024-03-29 PROCEDURE — 99214 OFFICE O/P EST MOD 30 MIN: CPT | Performed by: INTERNAL MEDICINE

## 2024-03-29 RX ORDER — BUPROPION HYDROCHLORIDE 150 MG/1
150 TABLET ORAL EVERY MORNING
Qty: 90 TABLET | Refills: 1 | Status: SHIPPED | OUTPATIENT
Start: 2024-03-29

## 2024-03-29 RX ORDER — TROSPIUM CHLORIDE ER 60 MG/1
60 CAPSULE ORAL DAILY
Qty: 90 CAPSULE | Refills: 3 | Status: SHIPPED | OUTPATIENT
Start: 2024-03-29

## 2024-03-29 RX ORDER — ATORVASTATIN CALCIUM 20 MG/1
20 TABLET, FILM COATED ORAL DAILY
Qty: 90 TABLET | Refills: 3 | Status: SHIPPED | OUTPATIENT
Start: 2024-03-29

## 2024-03-29 RX ORDER — DILTIAZEM HYDROCHLORIDE 120 MG/1
120 CAPSULE, COATED, EXTENDED RELEASE ORAL DAILY
Qty: 90 CAPSULE | Refills: 3 | Status: SHIPPED | OUTPATIENT
Start: 2024-03-29

## 2024-03-29 ASSESSMENT — PATIENT HEALTH QUESTIONNAIRE - PHQ9
1. LITTLE INTEREST OR PLEASURE IN DOING THINGS: SEVERAL DAYS
SUM OF ALL RESPONSES TO PHQ QUESTIONS 1-9: 2
2. FEELING DOWN, DEPRESSED OR HOPELESS: SEVERAL DAYS
SUM OF ALL RESPONSES TO PHQ QUESTIONS 1-9: 2
SUM OF ALL RESPONSES TO PHQ9 QUESTIONS 1 & 2: 2

## 2024-03-29 NOTE — PATIENT INSTRUCTIONS
Famotidine 20mg daily as needed for heartburn or indigestion.     You may stop fluoxetine 20mg daily and start bupropion 150mg daily. If you need to restart fluoxetine 20mg daily it is okay to take both together.

## 2024-03-29 NOTE — PROGRESS NOTES
HPI  Ms. Una Robison is a 58 y.o. year old female, she is seen today for follow up HTN, anxiety, cholesterol.     Plan last visit:  No evidence of UTI and no glucose in urine - suspect was error -   No DM - A1C is 5.7  High K managed by nephrology - has upcoming appointment  BP not to goal - add diltiazem - check BP at home  Constipation likely causing urinary symptoms - add senna docusate and miralax daily - may taper as needed for BM daily    Today:   Off lokelma - K was improved - BP was still elevated so he increased hydralazine to 100mg am and 50mg pm.   Has had quick fleeting twinges in her chest - not pain.   Has been under more stress lately worried about her mother and her daughter's marriage- feeling more sad, more difficulty focusing, not as motivated. Has been feeling this way for the last several months. No SI. Also feeling more tired, but not sleeping well at night - waking often.   No sob. Has been on wellbutrin in past and asking if she can take it again.   Would like to stop prozac and start wellbutrin.   Also asking if she can take b12 complex.   Has heartburn off and on as well - takes nexium prn which helps.         Chief Complaint   Patient presents with    Follow-up    Hypertension    Anxiety     6 Month Follow Up for HTN, Cholesterol and Anxiety    Cholesterol Problem        Prior to Admission medications    Medication Sig Start Date End Date Taking? Authorizing Provider   buPROPion (WELLBUTRIN XL) 150 MG extended release tablet Take 1 tablet by mouth every morning 3/29/24  Yes Jahaira Fernandez MD   dilTIAZem (CARDIZEM CD) 120 MG extended release capsule Take 1 capsule by mouth daily 3/29/24  Yes Jahaira Fernandez MD   atorvastatin (LIPITOR) 20 MG tablet Take 1 tablet by mouth daily 3/29/24  Yes Jahaira Fernandez MD   trospium (SANCTURA) 60 MG CP24 extended release capsule Take 1 capsule by mouth daily 3/29/24  Yes Jahaira Fernandez MD   hydrALAZINE (APRESOLINE) 50 MG tablet Take 1

## 2024-03-29 NOTE — PROGRESS NOTES
Room:  I have reviewed all needed documentation in preparation for visit. Verified patient by name and date of birth. Rooming procedures conducted per protocol. Reviewed allergies and medications with patient.   Una Robison is a 58 y.o. female for Follow-up, Hypertension, Anxiety (6 Month Follow Up for HTN, Cholesterol and Anxiety), and Cholesterol Problem     Patient would like to speak to the doctor regarding switch her prozac for wellbutrin. She feels like that works better as she was on it awhile ago.  Patient states that she has no motivation. Some days she doesn't feel like getting out of bed. Patient states that she stressed.     Vitals:    03/29/24 1201   BP: 138/82   Pulse: 92   Resp: 17   Temp: 97.3 °F (36.3 °C)   SpO2: 98%         Health Maintenance Due   Topic Date Due    HIV screen  Never done    DTaP/Tdap/Td vaccine (1 - Tdap) Never done    Shingles vaccine (1 of 2) Never done    COVID-19 Vaccine (3 - 2023-24 season) 09/01/2023    Respiratory Syncytial Virus (RSV) Pregnant or age 60 yrs+ (1 - 1-dose 60+ series) Never done        \"Have you been to the ER, urgent care clinic since your last visit?  Hospitalized since your last visit?\"    NO    “Have you seen or consulted any other health care providers outside of [facility] since your last visit?”    NO

## 2024-06-18 DIAGNOSIS — I10 ESSENTIAL HYPERTENSION: ICD-10-CM

## 2024-06-18 RX ORDER — DILTIAZEM HYDROCHLORIDE 120 MG/1
CAPSULE, COATED, EXTENDED RELEASE ORAL DAILY
Qty: 90 CAPSULE | Refills: 3 | Status: SHIPPED | OUTPATIENT
Start: 2024-06-18

## 2024-07-08 ENCOUNTER — OFFICE VISIT (OUTPATIENT)
Facility: CLINIC | Age: 59
End: 2024-07-08
Payer: COMMERCIAL

## 2024-07-08 VITALS
WEIGHT: 167.5 LBS | OXYGEN SATURATION: 98 % | SYSTOLIC BLOOD PRESSURE: 138 MMHG | HEIGHT: 66 IN | RESPIRATION RATE: 12 BRPM | TEMPERATURE: 98.1 F | DIASTOLIC BLOOD PRESSURE: 80 MMHG | BODY MASS INDEX: 26.92 KG/M2 | HEART RATE: 96 BPM

## 2024-07-08 DIAGNOSIS — I10 ESSENTIAL HYPERTENSION: Primary | ICD-10-CM

## 2024-07-08 DIAGNOSIS — F41.9 ANXIETY AND DEPRESSION: ICD-10-CM

## 2024-07-08 DIAGNOSIS — F32.A ANXIETY AND DEPRESSION: ICD-10-CM

## 2024-07-08 DIAGNOSIS — E78.2 MIXED HYPERLIPIDEMIA: ICD-10-CM

## 2024-07-08 PROCEDURE — 3075F SYST BP GE 130 - 139MM HG: CPT | Performed by: INTERNAL MEDICINE

## 2024-07-08 PROCEDURE — 3079F DIAST BP 80-89 MM HG: CPT | Performed by: INTERNAL MEDICINE

## 2024-07-08 PROCEDURE — 99213 OFFICE O/P EST LOW 20 MIN: CPT | Performed by: INTERNAL MEDICINE

## 2024-07-08 NOTE — PROGRESS NOTES
Una Robison  Identified pt with two pt identifiers(name and ).     Chief Complaint   Patient presents with    Hypertension    Anxiety       Reviewed record In preparation for visit and have obtained necessary documentation.     1. Have you been to the ER, urgent care clinic or hospitalized since your last visit? No     2. Have you seen or consulted any other health care providers outside of the [facility] since your last visit? Include any pap smears or colon screening. No    Vitals reviewed with provider.    Health Maintenance reviewed:     Health Maintenance Due   Topic    HIV screen     DTaP/Tdap/Td vaccine (1 - Tdap)    Shingles vaccine (1 of 2)    COVID-19 Vaccine (3 -  season)    Respiratory Syncytial Virus (RSV) Pregnant or age 60 yrs+ (1 - 1-dose 60+ series)    Breast cancer screen           Wt Readings from Last 3 Encounters:   24 78 kg (172 lb)   24 77.1 kg (170 lb)   23 78 kg (172 lb)        Temp Readings from Last 3 Encounters:   24 97.3 °F (36.3 °C) (Temporal)   24 97.8 °F (36.6 °C) (Oral)   23 98 °F (36.7 °C) (Oral)        BP Readings from Last 3 Encounters:   24 138/82   24 (!) 168/90   23 125/79        Pulse Readings from Last 3 Encounters:   24 92   24 90   23 74      [unfilled]       Learning Assessment:   :         3/1/2022    12:00 AM   [facility] AMB LEARNING ASSESSMENT   Primary Learner Patient   level of education SOME COLLEGE   Barriers Factors NONE   Primary Language ENGLISH   Learning Preference READING   Answered By Patient   Relationship to Learner SELF         Fall Risk Assessment:   :         2022     9:15 AM 2022     8:35 PM 2022     7:56 AM 2022     4:29 AM 2022     8:53 PM 2022     6:00 PM 2022     8:00 AM   Amb Fall Risk Assessment and TUG Test   Total Score 1 1 1 1 1 1 1          Abuse Screening:   :         2024     3:00 PM   AMB Abuse Screening   Do

## 2024-07-08 NOTE — PROGRESS NOTES
HPI  Ms. Una Robison is a 58 y.o. year old female, she is seen today for follow up HTN, anxiety.   Plan last visit:  BP at goal - continue current medications   Continue atorvastatin for high cholesterol which has been at goal  Stop fluoxetine, start wellbutrin for worsening mood     Today:  No chest pain, sob, dizziness, weakness, no palpitations.   Staying hydrated.   Mood - \"little down\" - daughter going through divorce - she is living with them with her children as well since April - she has been worried about this - stress seems reasonable and she is handling it overall well        Chief Complaint   Patient presents with    Hypertension    Anxiety        Prior to Admission medications    Medication Sig Start Date End Date Taking? Authorizing Provider   dilTIAZem (CARDIZEM CD) 120 MG extended release capsule TAKE 1 CAPSULE BY MOUTH EVERY DAY 6/18/24  Yes Jahaira Fernandez MD   buPROPion (WELLBUTRIN XL) 150 MG extended release tablet Take 1 tablet by mouth every morning 3/29/24  Yes Jahaira Fernandez MD   atorvastatin (LIPITOR) 20 MG tablet Take 1 tablet by mouth daily 3/29/24  Yes Jahaira Fernandez MD   trospium (SANCTURA) 60 MG CP24 extended release capsule Take 1 capsule by mouth daily 3/29/24  Yes Jahaira Fernandez MD   hydrALAZINE (APRESOLINE) 50 MG tablet 100 mg in morning and 50 mg at night 12/11/23  Yes Provider, MD [place]   senna-docusate (SB DOCUSATE SODIUM/SENNA) 8.6-50 MG per tablet Take 2 tablets by mouth daily 1/8/24  Yes Jahaira Fernandez MD   aspirin 81 MG EC tablet Take 1 tablet by mouth   Yes Automatic Reconciliation, Ar         Allergies   Allergen Reactions    Chlorthalidone Other (See Comments)     Low sodium - severe    Hydrochlorothiazide Other (See Comments)     Low sodium    Amlodipine Swelling         REVIEW OF SYSTEMS:  Per HPI    PHYSICAL EXAM:  /80   Pulse 96   Temp 98.1 °F (36.7 °C) (Oral)   Resp 12   Ht 1.676 m (5' 6\")   Wt 76 kg (167 lb 8 oz)   SpO2 98%

## 2024-07-09 LAB
ALBUMIN SERPL-MCNC: 4 G/DL (ref 3.5–5)
ALBUMIN/GLOB SERPL: 1 (ref 1.1–2.2)
ALP SERPL-CCNC: 117 U/L (ref 45–117)
ALT SERPL-CCNC: 29 U/L (ref 12–78)
ANION GAP SERPL CALC-SCNC: 6 MMOL/L (ref 5–15)
AST SERPL-CCNC: 25 U/L (ref 15–37)
BILIRUB SERPL-MCNC: 0.3 MG/DL (ref 0.2–1)
BUN SERPL-MCNC: 9 MG/DL (ref 6–20)
BUN/CREAT SERPL: 10 (ref 12–20)
CALCIUM SERPL-MCNC: 9.9 MG/DL (ref 8.5–10.1)
CHLORIDE SERPL-SCNC: 103 MMOL/L (ref 97–108)
CHOLEST SERPL-MCNC: 159 MG/DL
CO2 SERPL-SCNC: 28 MMOL/L (ref 21–32)
CREAT SERPL-MCNC: 0.88 MG/DL (ref 0.55–1.02)
GLOBULIN SER CALC-MCNC: 4.1 G/DL (ref 2–4)
GLUCOSE SERPL-MCNC: 100 MG/DL (ref 65–100)
HDLC SERPL-MCNC: 53 MG/DL
HDLC SERPL: 3 (ref 0–5)
LDLC SERPL CALC-MCNC: 86.4 MG/DL (ref 0–100)
POTASSIUM SERPL-SCNC: 4 MMOL/L (ref 3.5–5.1)
PROT SERPL-MCNC: 8.1 G/DL (ref 6.4–8.2)
SODIUM SERPL-SCNC: 137 MMOL/L (ref 136–145)
TRIGL SERPL-MCNC: 98 MG/DL
VLDLC SERPL CALC-MCNC: 19.6 MG/DL

## 2024-07-10 LAB — TSH SERPL DL<=0.05 MIU/L-ACNC: 1.5 UIU/ML (ref 0.45–4.5)

## 2024-09-15 DIAGNOSIS — F32.A ANXIETY AND DEPRESSION: ICD-10-CM

## 2024-09-15 DIAGNOSIS — F41.9 ANXIETY AND DEPRESSION: ICD-10-CM

## 2024-09-16 RX ORDER — BUPROPION HYDROCHLORIDE 150 MG/1
150 TABLET ORAL EVERY MORNING
Qty: 90 TABLET | Refills: 1 | Status: SHIPPED | OUTPATIENT
Start: 2024-09-16

## 2024-12-20 DIAGNOSIS — E78.2 MIXED HYPERLIPIDEMIA: ICD-10-CM

## 2024-12-20 RX ORDER — ATORVASTATIN CALCIUM 20 MG/1
20 TABLET, FILM COATED ORAL DAILY
Qty: 90 TABLET | Refills: 0 | Status: SHIPPED | OUTPATIENT
Start: 2024-12-20

## 2024-12-20 NOTE — TELEPHONE ENCOUNTER
PCP: Jahaira Fernandez MD     Last appt:  7/8/2024      Future Appointments   Date Time Provider Department Center   1/8/2025  2:10 PM Jahaira Fernandez MD [facility] DEP          Requested Prescriptions     Pending Prescriptions Disp Refills    atorvastatin (LIPITOR) 20 MG tablet [Pharmacy Med Name: ATORVASTATIN 20 MG TABLET] 90 tablet 3     Sig: TAKE 1 TABLET BY MOUTH EVERY DAY

## 2024-12-30 ENCOUNTER — TELEPHONE (OUTPATIENT)
Facility: CLINIC | Age: 59
End: 2024-12-30

## 2024-12-30 NOTE — TELEPHONE ENCOUNTER
"eICU Brief Admission Note       Briefly, 68 y/F with HTN, nephrotic syndrome, h/o HTN crisis presented with acute onset of shortness of breath that started tonight. History is taken from EMS and old medical records.  Patient lives in assisted living. Patient denies chest pain. She states she is just very short of breath and repeatedly asked for help.  No reported nausea or vomiting.  No abdominal pain. She has leg edema that she states is "always there."  Patient was placed on CPAP for an O2 sat of 60% on room air.  Blood pressure was reportedly elevated at 270 systolic.        Video assessment :    Vitals reviewed   Afebrile, hypertensive, tachypnea resolving     LABs reviewed       Radiology reviewed   CXR   Mild interstitial edema and bilateral pleural effusions suggestive of CHF exacerbation/volume overload.      Assessment / Plan :  # HTN emergency   # pulmonary edema   # elevated trops/BNP; h/o CHF   # anemia   # ANIA/CKD   # high FSBS   # h/o Nephrotic syndrome   - on Nitroglycerine drip   - BiPAP 15/6,  - 700   - TTE   - may consult cardiology   - trend trops/EKG   - s/p Bumex in ER         DVT Px : Lovenox SQ   GI Px : Pepcid      Patient seen over video : Yes  Chart reviewed : Yes  Spoke with RN : No                 " Pt called because she went to  her trospium but it was for only 30 days. Pt stated that she needs all her medications for 90 days due to insurance. She uses the [facility] [address]

## 2025-01-08 ENCOUNTER — OFFICE VISIT (OUTPATIENT)
Facility: CLINIC | Age: 60
End: 2025-01-08
Payer: COMMERCIAL

## 2025-01-08 VITALS
HEART RATE: 96 BPM | RESPIRATION RATE: 12 BRPM | BODY MASS INDEX: 27.32 KG/M2 | SYSTOLIC BLOOD PRESSURE: 136 MMHG | HEIGHT: 66 IN | TEMPERATURE: 98.8 F | DIASTOLIC BLOOD PRESSURE: 70 MMHG | WEIGHT: 170 LBS | OXYGEN SATURATION: 97 %

## 2025-01-08 DIAGNOSIS — R14.0 BLOATING: ICD-10-CM

## 2025-01-08 DIAGNOSIS — R10.13 DYSPEPSIA: ICD-10-CM

## 2025-01-08 DIAGNOSIS — K59.00 CONSTIPATION, UNSPECIFIED CONSTIPATION TYPE: ICD-10-CM

## 2025-01-08 DIAGNOSIS — R14.2 BELCHING: ICD-10-CM

## 2025-01-08 DIAGNOSIS — R07.9 CHEST PAIN, UNSPECIFIED TYPE: Primary | ICD-10-CM

## 2025-01-08 DIAGNOSIS — I10 ESSENTIAL HYPERTENSION: ICD-10-CM

## 2025-01-08 DIAGNOSIS — F41.8 DEPRESSION WITH ANXIETY: ICD-10-CM

## 2025-01-08 LAB
ALBUMIN SERPL-MCNC: 4.1 G/DL (ref 3.5–5)
ALBUMIN/GLOB SERPL: 1 (ref 1.1–2.2)
ALP SERPL-CCNC: 120 U/L (ref 45–117)
ALT SERPL-CCNC: 22 U/L (ref 12–78)
ANION GAP SERPL CALC-SCNC: 7 MMOL/L (ref 2–12)
AST SERPL-CCNC: 28 U/L (ref 15–37)
BASOPHILS # BLD: 0.07 K/UL (ref 0–0.1)
BASOPHILS NFR BLD: 0.9 % (ref 0–1)
BILIRUB SERPL-MCNC: 0.3 MG/DL (ref 0.2–1)
BUN SERPL-MCNC: 10 MG/DL (ref 6–20)
BUN/CREAT SERPL: 11 (ref 12–20)
CALCIUM SERPL-MCNC: 9.9 MG/DL (ref 8.5–10.1)
CHLORIDE SERPL-SCNC: 102 MMOL/L (ref 97–108)
CO2 SERPL-SCNC: 29 MMOL/L (ref 21–32)
CREAT SERPL-MCNC: 0.87 MG/DL (ref 0.55–1.02)
DIFFERENTIAL METHOD BLD: NORMAL
EOSINOPHIL # BLD: 0.19 K/UL (ref 0–0.4)
EOSINOPHIL NFR BLD: 2.4 % (ref 0–7)
ERYTHROCYTE [DISTWIDTH] IN BLOOD BY AUTOMATED COUNT: 13 % (ref 11.5–14.5)
GLOBULIN SER CALC-MCNC: 4 G/DL (ref 2–4)
GLUCOSE SERPL-MCNC: 92 MG/DL (ref 65–100)
HCT VFR BLD AUTO: 38.6 % (ref 35–47)
HGB BLD-MCNC: 12.2 G/DL (ref 11.5–16)
IMM GRANULOCYTES # BLD AUTO: 0.03 K/UL (ref 0–0.04)
IMM GRANULOCYTES NFR BLD AUTO: 0.4 % (ref 0–0.5)
LYMPHOCYTES # BLD: 2.19 K/UL (ref 0.8–3.5)
LYMPHOCYTES NFR BLD: 27.7 % (ref 12–49)
MCH RBC QN AUTO: 29 PG (ref 26–34)
MCHC RBC AUTO-ENTMCNC: 31.6 G/DL (ref 30–36.5)
MCV RBC AUTO: 91.9 FL (ref 80–99)
MONOCYTES # BLD: 0.84 K/UL (ref 0–1)
MONOCYTES NFR BLD: 10.6 % (ref 5–13)
NEUTS SEG # BLD: 4.59 K/UL (ref 1.8–8)
NEUTS SEG NFR BLD: 58 % (ref 32–75)
NRBC # BLD: 0 K/UL (ref 0–0.01)
NRBC BLD-RTO: 0 PER 100 WBC
PLATELET # BLD AUTO: 395 K/UL (ref 150–400)
PMV BLD AUTO: 10.9 FL (ref 8.9–12.9)
POTASSIUM SERPL-SCNC: 4.1 MMOL/L (ref 3.5–5.1)
PROT SERPL-MCNC: 8.1 G/DL (ref 6.4–8.2)
RBC # BLD AUTO: 4.2 M/UL (ref 3.8–5.2)
SODIUM SERPL-SCNC: 138 MMOL/L (ref 136–145)
WBC # BLD AUTO: 7.9 K/UL (ref 3.6–11)

## 2025-01-08 PROCEDURE — 99214 OFFICE O/P EST MOD 30 MIN: CPT | Performed by: INTERNAL MEDICINE

## 2025-01-08 PROCEDURE — 93000 ELECTROCARDIOGRAM COMPLETE: CPT | Performed by: INTERNAL MEDICINE

## 2025-01-08 PROCEDURE — 3078F DIAST BP <80 MM HG: CPT | Performed by: INTERNAL MEDICINE

## 2025-01-08 PROCEDURE — 3075F SYST BP GE 130 - 139MM HG: CPT | Performed by: INTERNAL MEDICINE

## 2025-01-08 RX ORDER — BUPROPION HYDROCHLORIDE 300 MG/1
300 TABLET ORAL EVERY MORNING
Qty: 90 TABLET | Refills: 2 | Status: SHIPPED | OUTPATIENT
Start: 2025-01-08

## 2025-01-08 SDOH — ECONOMIC STABILITY: FOOD INSECURITY: WITHIN THE PAST 12 MONTHS, THE FOOD YOU BOUGHT JUST DIDN'T LAST AND YOU DIDN'T HAVE MONEY TO GET MORE.: NEVER TRUE

## 2025-01-08 SDOH — ECONOMIC STABILITY: FOOD INSECURITY: WITHIN THE PAST 12 MONTHS, YOU WORRIED THAT YOUR FOOD WOULD RUN OUT BEFORE YOU GOT MONEY TO BUY MORE.: NEVER TRUE

## 2025-01-08 ASSESSMENT — PATIENT HEALTH QUESTIONNAIRE - PHQ9
2. FEELING DOWN, DEPRESSED OR HOPELESS: SEVERAL DAYS
4. FEELING TIRED OR HAVING LITTLE ENERGY: SEVERAL DAYS
SUM OF ALL RESPONSES TO PHQ QUESTIONS 1-9: 5
6. FEELING BAD ABOUT YOURSELF - OR THAT YOU ARE A FAILURE OR HAVE LET YOURSELF OR YOUR FAMILY DOWN: MORE THAN HALF THE DAYS
9. THOUGHTS THAT YOU WOULD BE BETTER OFF DEAD, OR OF HURTING YOURSELF: NOT AT ALL
SUM OF ALL RESPONSES TO PHQ QUESTIONS 1-9: 5
7. TROUBLE CONCENTRATING ON THINGS, SUCH AS READING THE NEWSPAPER OR WATCHING TELEVISION: NOT AT ALL
SUM OF ALL RESPONSES TO PHQ QUESTIONS 1-9: 5
8. MOVING OR SPEAKING SO SLOWLY THAT OTHER PEOPLE COULD HAVE NOTICED. OR THE OPPOSITE, BEING SO FIGETY OR RESTLESS THAT YOU HAVE BEEN MOVING AROUND A LOT MORE THAN USUAL: NOT AT ALL
1. LITTLE INTEREST OR PLEASURE IN DOING THINGS: NOT AT ALL
10. IF YOU CHECKED OFF ANY PROBLEMS, HOW DIFFICULT HAVE THESE PROBLEMS MADE IT FOR YOU TO DO YOUR WORK, TAKE CARE OF THINGS AT HOME, OR GET ALONG WITH OTHER PEOPLE: NOT DIFFICULT AT ALL
3. TROUBLE FALLING OR STAYING ASLEEP: SEVERAL DAYS
SUM OF ALL RESPONSES TO PHQ9 QUESTIONS 1 & 2: 1
SUM OF ALL RESPONSES TO PHQ QUESTIONS 1-9: 5
5. POOR APPETITE OR OVEREATING: NOT AT ALL

## 2025-01-08 NOTE — PROGRESS NOTES
Una Robison  Identified pt with two pt identifiers(name and ).     Chief Complaint   Patient presents with    Hypertension    Anxiety    Chest Pain     Off and on for last couple months        Reviewed record In preparation for visit and have obtained necessary documentation.     1. Have you been to the ER, urgent care clinic or hospitalized since your last visit? No     2. Have you seen or consulted any other health care providers outside of the [facility] since your last visit? Include any pap smears or colon screening. No    Vitals reviewed with provider.    Health Maintenance reviewed:     Health Maintenance Due   Topic    HIV screen     DTaP/Tdap/Td vaccine (1 - Tdap)    Shingles vaccine (1 of 2)          Wt Readings from Last 3 Encounters:   25 77.1 kg (170 lb)   24 76 kg (167 lb 8 oz)   24 78 kg (172 lb)        Temp Readings from Last 3 Encounters:   25 98.8 °F (37.1 °C) (Oral)   24 98.1 °F (36.7 °C) (Oral)   24 97.3 °F (36.3 °C) (Temporal)        BP Readings from Last 3 Encounters:   25 (!) 149/90   24 138/80   24 138/82        Pulse Readings from Last 3 Encounters:   25 96   24 96   24 92      [unfilled]       Learning Assessment:   :         3/1/2022    12:00 AM   [facility] AMB LEARNING ASSESSMENT   Primary Learner Patient   level of education SOME COLLEGE   Barriers Factors NONE   Primary Language ENGLISH   Learning Preference READING   Answered By Patient   Relationship to Learner SELF         Fall Risk Assessment:   :         2022     9:15 AM 2022     8:35 PM 2022     7:56 AM 2022     4:29 AM 2022     8:53 PM 2022     6:00 PM 2022     8:00 AM   Amb Fall Risk Assessment and TUG Test   Total Score 1 1 1 1 1 1 1          Abuse Screening:   :         2025     2:00 PM 2024     3:00 PM   AMB Abuse Screening   Do you ever feel afraid of your partner? N N   Are you in a relationship

## 2025-01-08 NOTE — PROGRESS NOTES
HPI  Ms. Una Robison is a 59 y.o. year old female, she is seen today for follow up anxiety.   Plan last visit:  BP at goal - continue current medications   Check labs  Anxiety stable - continue wellbutrin    Today:  Complains of intermittent chest \"constriction\" middle of chest to left - happened 2 nights ago while lying on sofa, sometimes associated with sob. 2 nights ago lasted about an hour, yesterday off and on throughout the day and none today. No clear triggers - started about a month ago. No cough or wheezing.   No cough, not consistent with exertion. Now off and on will feel sob walking up stairs or vacuuming.   BP has been \"good\" - remembers 142/87.     Doesn't feel wellbutrin adequately treats depression, sadness, feels down a lot. Worries about daughter going through a divorce, son getting ready to move out - building a house. Also worries about mother's health.     Feels bloated and constipated a lot - miralax hasn't been helping constipation.   Belching more as well.   Just saw gynecologist recently and had pelvic exam.     Chief Complaint   Patient presents with    Hypertension    Anxiety    Chest Pain     Off and on for last couple months         Prior to Admission medications    Medication Sig Start Date End Date Taking? Authorizing Provider   buPROPion (WELLBUTRIN XL) 300 MG extended release tablet Take 1 tablet by mouth every morning 1/8/25  Yes Jahaira Fernandez MD   omeprazole (PRILOSEC) 20 MG delayed release capsule Take 1 capsule by mouth every morning (before breakfast) 1/8/25  Yes Jahaira Fernandez MD   atorvastatin (LIPITOR) 20 MG tablet TAKE 1 TABLET BY MOUTH EVERY DAY 12/20/24  Yes Jahaira Fernandez MD   dilTIAZem (CARDIZEM CD) 120 MG extended release capsule TAKE 1 CAPSULE BY MOUTH EVERY DAY 6/18/24  Yes Jahaira Fernandez MD   trospium (SANCTURA) 60 MG CP24 extended release capsule Take 1 capsule by mouth daily 3/29/24  Yes Jahaira Fernandez MD   hydrALAZINE (APRESOLINE) 50 MG

## 2025-01-11 LAB
ENDOMYSIUM IGA SER QL: NEGATIVE
IGA SERPL-MCNC: 493 MG/DL (ref 87–352)
TTG IGA SER-ACNC: <2 U/ML (ref 0–3)

## 2025-01-30 DIAGNOSIS — R10.13 DYSPEPSIA: ICD-10-CM

## 2025-01-30 NOTE — TELEPHONE ENCOUNTER
PCP: Jahaira Fernandez MD     Last appt: 1/8/2025    Future Appointments   Date Time Provider Department Center   2/19/2025 10:50 AM Jahaira Fernandez MD [facility] DEP          Requested Prescriptions     Pending Prescriptions Disp Refills    omeprazole (PRILOSEC) 20 MG delayed release capsule [Pharmacy Med Name: OMEPRAZOLE DR 20 MG CAPSULE] 90 capsule 1     Sig: TAKE 1 CAPSULE BY MOUTH EVERY DAY IN THE MORNING BEFORE BREAKFAST

## 2025-03-04 ENCOUNTER — OFFICE VISIT (OUTPATIENT)
Facility: CLINIC | Age: 60
End: 2025-03-04
Payer: COMMERCIAL

## 2025-03-04 VITALS
DIASTOLIC BLOOD PRESSURE: 80 MMHG | TEMPERATURE: 98.2 F | BODY MASS INDEX: 27.61 KG/M2 | OXYGEN SATURATION: 96 % | WEIGHT: 171.8 LBS | SYSTOLIC BLOOD PRESSURE: 130 MMHG | HEIGHT: 66 IN | HEART RATE: 100 BPM | RESPIRATION RATE: 16 BRPM

## 2025-03-04 DIAGNOSIS — I10 ESSENTIAL HYPERTENSION: Primary | ICD-10-CM

## 2025-03-04 DIAGNOSIS — E78.2 MIXED HYPERLIPIDEMIA: ICD-10-CM

## 2025-03-04 DIAGNOSIS — F41.8 DEPRESSION WITH ANXIETY: ICD-10-CM

## 2025-03-04 PROCEDURE — 3079F DIAST BP 80-89 MM HG: CPT | Performed by: INTERNAL MEDICINE

## 2025-03-04 PROCEDURE — 3075F SYST BP GE 130 - 139MM HG: CPT | Performed by: INTERNAL MEDICINE

## 2025-03-04 PROCEDURE — 99213 OFFICE O/P EST LOW 20 MIN: CPT | Performed by: INTERNAL MEDICINE

## 2025-03-04 RX ORDER — PLECANATIDE 3 MG/1
1 TABLET ORAL DAILY
COMMUNITY

## 2025-03-04 NOTE — PROGRESS NOTES
Una Robison  Identified pt with two pt identifiers(name and ).  Chief Complaint   Patient presents with    Anxiety       1. Have you been to the ER, urgent care clinic since your last visit?  Hospitalized since your last visit? NO    2. Have you seen or consulted any other health care providers outside of the [facility] since your last visit?  Include any pap smears or colon screening. NO      Provider notified of reason for visit, vitals and flowsheets obtained on patients.     Patient received paperwork for advance directive during previous visit but has not completed at this time     Reviewed record In preparation for visit, huddled with provider and have obtained necessary documentation      Health Maintenance Due   Topic    DTaP/Tdap/Td vaccine (1 - Tdap)    Shingles vaccine (1 of 2)    Pneumococcal 50+ years Vaccine (1 of 1 - PCV)       Wt Readings from Last 3 Encounters:   25 77.1 kg (170 lb)   24 76 kg (167 lb 8 oz)   24 78 kg (172 lb)     Temp Readings from Last 3 Encounters:   25 98.8 °F (37.1 °C) (Oral)   24 98.1 °F (36.7 °C) (Oral)   24 97.3 °F (36.3 °C) (Temporal)     BP Readings from Last 3 Encounters:   25 136/70   24 138/80   24 138/82     Pulse Readings from Last 3 Encounters:   25 96   24 96   24 92          No data to display                  Learning Assessment:  :         3/1/2022    12:00 AM   [facility] AMB LEARNING ASSESSMENT   Primary Learner Patient   level of education SOME COLLEGE   Barriers Factors NONE   Primary Language ENGLISH   Learning Preference READING   Answered By Patient   Relationship to Learner SELF       Fall Risk Assessment:  :         2022     9:15 AM 2022     8:35 PM 2022     7:56 AM 2022     4:29 AM 2022     8:53 PM 2022     6:00 PM 2022     8:00 AM   Amb Fall Risk Assessment and TUG Test   Total Score 1 1 1 1 1 1 1       Abuse Screening:  :

## 2025-03-04 NOTE — PROGRESS NOTES
HPI  Ms. Una Robison is a 59 y.o. year old female, she is seen today for follow up anxiety.   Says mood is \"great\" - not feeling sad, down or depressed.     Will be getting EGD for abdominal bloating, indigestion - Dr. Blancas. Now on trulance for IBS-C. Seems to be helping - but has only been on it 1-2 weeks.     HTN - BP has been well controlled at home - no chest pain or sob    Chief Complaint   Patient presents with    Anxiety        Prior to Admission medications    Medication Sig Start Date End Date Taking? Authorizing Provider   TRULANCE 3 MG TABS Take 1 tablet by mouth daily   Yes Provider, MD Augusta   omeprazole (PRILOSEC) 20 MG delayed release capsule TAKE 1 CAPSULE BY MOUTH EVERY DAY IN THE MORNING BEFORE BREAKFAST 1/30/25  Yes Jahaira Fernandez MD   buPROPion (WELLBUTRIN XL) 300 MG extended release tablet Take 1 tablet by mouth every morning 1/8/25  Yes Jahaira Fernandez MD   atorvastatin (LIPITOR) 20 MG tablet TAKE 1 TABLET BY MOUTH EVERY DAY 12/20/24  Yes Jahaira Fernandez MD   dilTIAZem (CARDIZEM CD) 120 MG extended release capsule TAKE 1 CAPSULE BY MOUTH EVERY DAY 6/18/24  Yes Jahaira Fernandez MD   trospium (SANCTURA) 60 MG CP24 extended release capsule Take 1 capsule by mouth daily 3/29/24  Yes Jahaira Fernandez MD   hydrALAZINE (APRESOLINE) 50 MG tablet 100 mg in morning and 50 mg at night 12/11/23  Yes Provider, MD Augusta   senna-docusate (SB DOCUSATE SODIUM/SENNA) 8.6-50 MG per tablet Take 2 tablets by mouth daily 1/8/24  Yes Jahaira Fernandez MD   aspirin 81 MG EC tablet Take 1 tablet by mouth   Yes Automatic Reconciliation, Ar         Allergies   Allergen Reactions    Chlorthalidone Other (See Comments)     Low sodium - severe    Hydrochlorothiazide Other (See Comments)     Low sodium    Amlodipine Swelling         REVIEW OF SYSTEMS:  Per HPI    PHYSICAL EXAM:  /80   Pulse 100   Temp 98.2 °F (36.8 °C) (Oral)   Resp 16   Ht 1.676 m (5' 6\")   Wt 77.9 kg (171 lb

## 2025-03-28 DIAGNOSIS — I10 ESSENTIAL HYPERTENSION: ICD-10-CM

## 2025-03-28 DIAGNOSIS — N32.81 OAB (OVERACTIVE BLADDER): ICD-10-CM

## 2025-03-28 RX ORDER — DILTIAZEM HYDROCHLORIDE 120 MG/1
CAPSULE, EXTENDED RELEASE ORAL DAILY
Qty: 90 CAPSULE | Refills: 2 | OUTPATIENT
Start: 2025-03-28

## 2025-03-28 RX ORDER — TROSPIUM CHLORIDE ER 60 MG/1
CAPSULE ORAL DAILY
Qty: 90 CAPSULE | Refills: 2 | Status: SHIPPED | OUTPATIENT
Start: 2025-03-28

## 2025-03-28 RX ORDER — DILTIAZEM HYDROCHLORIDE 120 MG/1
CAPSULE, COATED, EXTENDED RELEASE ORAL
Qty: 90 CAPSULE | Refills: 3 | Status: SHIPPED | OUTPATIENT
Start: 2025-03-28

## 2025-03-28 NOTE — TELEPHONE ENCOUNTER
PCP: Jahaira Fernandez MD     Last appt: 3/4/2025    Future Appointments   Date Time Provider Department Center   9/8/2025 11:10 AM Jahaira Fernandez MD [facility] DEP          Requested Prescriptions     Pending Prescriptions Disp Refills    trospium (SANCTURA) 60 MG CP24 extended release capsule [Pharmacy Med Name: TROSPIUM CHLORIDE ER 60 MG CAP] 90 capsule 2     Sig: TAKE 1 CAPSULE BY MOUTH EVERY DAY    dilTIAZem (TIADYLT ER) 120 MG extended release capsule [Pharmacy Med Name: TIADYLT  MG CAPSULE] 90 capsule 2     Sig: TAKE 1 CAPSULE BY MOUTH EVERY DAY

## 2025-04-15 DIAGNOSIS — E78.2 MIXED HYPERLIPIDEMIA: ICD-10-CM

## 2025-04-15 RX ORDER — ATORVASTATIN CALCIUM 20 MG/1
20 TABLET, FILM COATED ORAL DAILY
Qty: 90 TABLET | Refills: 3 | Status: SHIPPED | OUTPATIENT
Start: 2025-04-15

## 2025-04-15 NOTE — TELEPHONE ENCOUNTER
Future Appointments:  Future Appointments   Date Time Provider Department Center   9/8/2025 11:10 AM Jahaira Fernandez MD [facility] DEP        Last Appointment With Me:  3/4/2025     Requested Prescriptions     Pending Prescriptions Disp Refills    atorvastatin (LIPITOR) 20 MG tablet [Pharmacy Med Name: ATORVASTATIN 20 MG TABLET] 90 tablet 0     Sig: TAKE 1 TABLET BY MOUTH EVERY DAY

## 2025-05-06 ENCOUNTER — OFFICE VISIT (OUTPATIENT)
Age: 60
End: 2025-05-06

## 2025-05-06 VITALS
WEIGHT: 166 LBS | RESPIRATION RATE: 16 BRPM | DIASTOLIC BLOOD PRESSURE: 92 MMHG | OXYGEN SATURATION: 99 % | TEMPERATURE: 97.6 F | HEART RATE: 88 BPM | SYSTOLIC BLOOD PRESSURE: 147 MMHG | BODY MASS INDEX: 26.79 KG/M2

## 2025-05-06 DIAGNOSIS — R09.82 POST-NASAL DRIP: Primary | ICD-10-CM

## 2025-05-06 DIAGNOSIS — J30.9 ALLERGIC RHINITIS, UNSPECIFIED SEASONALITY, UNSPECIFIED TRIGGER: ICD-10-CM

## 2025-05-06 DIAGNOSIS — R05.1 ACUTE COUGH: ICD-10-CM

## 2025-05-06 RX ORDER — FLUTICASONE PROPIONATE 50 MCG
2 SPRAY, SUSPENSION (ML) NASAL DAILY
Qty: 16 G | Refills: 0 | Status: SHIPPED | OUTPATIENT
Start: 2025-05-06

## 2025-05-06 RX ORDER — CETIRIZINE HYDROCHLORIDE 10 MG/1
10 TABLET ORAL DAILY
Qty: 30 TABLET | Refills: 0 | Status: SHIPPED | OUTPATIENT
Start: 2025-05-06

## 2025-05-06 NOTE — PROGRESS NOTES
2025   Una Robison (: 1965) is a 59 y.o. female, New patient, here for evaluation of the following chief complaint(s):  Pharyngitis (Sore throat, sinus pain and SOB/X 4 days )     ASSESSMENT/PLAN:  Below is the assessment and plan developed based on review of pertinent history, physical exam, labs, studies, and medications.  Assessment & Plan  Post-nasal drip          Exam and history consistent with acute cough likely secondary to post-nasal drip.   Flonase 1 squirt each nostril, once a day for at least the next 2 weeks  Mucinex Fastmax, Tylenol severe cold and flu, or DayQuil for symptomatic relief and decongestion  Zyrtec, Xyzal, Allegra, or Claritin for control of allergies for at least next 2 weeks  Ibuprofen 600 mg every 6 hours as needed and Tylenol 500 mg every 6 hours as needed for pain control.  Best if used in combination  Simple foods like chicken noodle soup, smoothies, hot tea with lemon and honey may also help  Steam inhalation, humidifier, warm compresses  Increase oral fluids to maintain hydration  Avoid smoking and minimize contact with environmental irritants    Please follow up with your primary care provider if your symptoms last more than 10 days or worsen.    Please go immediately to the Emergency Department if you develop:  Fever higher than 102F (38.9C), sudden and severe pain in the face and head, trouble seeing or seeing double, trouble thinking clearly, swelling or redness around one or both eyes or a stiff neck   Allergic rhinitis, unspecified seasonality, unspecified trigger       Orders:    fluticasone (FLONASE) 50 MCG/ACT nasal spray; 2 sprays by Each Nostril route daily    cetirizine (ZYRTEC) 10 MG tablet; Take 1 tablet by mouth daily    Acute cough                  Handout given with care instructions  2. OTC for symptom management. Increase fluid intake, ensure adequate nutritional intake.  3. Follow up with PCP as needed.  4. Go to ED with development of any acute

## 2025-05-07 NOTE — PATIENT INSTRUCTIONS
Exam and history consistent with acute cough likely secondary to post-nasal drip.   Flonase 1 squirt each nostril, once a day for at least the next 2 weeks  Mucinex Fastmax, Tylenol severe cold and flu, or DayQuil for symptomatic relief and decongestion  Zyrtec, Xyzal, Allegra, or Claritin for control of allergies for at least next 2 weeks  Ibuprofen 600 mg every 6 hours as needed and Tylenol 500 mg every 6 hours as needed for pain control.  Best if used in combination  Simple foods like chicken noodle soup, smoothies, hot tea with lemon and honey may also help  Steam inhalation, humidifier, warm compresses  Increase oral fluids to maintain hydration  Avoid smoking and minimize contact with environmental irritants    Please follow up with your primary care provider if your symptoms last more than 10 days or worsen.    Please go immediately to the Emergency Department if you develop:  Fever higher than 102F (38.9C), sudden and severe pain in the face and head, trouble seeing or seeing double, trouble thinking clearly, swelling or redness around one or both eyes or a stiff neck

## 2025-05-30 DIAGNOSIS — J30.9 ALLERGIC RHINITIS, UNSPECIFIED SEASONALITY, UNSPECIFIED TRIGGER: ICD-10-CM

## 2025-06-07 DIAGNOSIS — I10 ESSENTIAL HYPERTENSION: ICD-10-CM

## 2025-06-07 RX ORDER — CETIRIZINE HYDROCHLORIDE 10 MG/1
10 TABLET ORAL DAILY
Qty: 90 TABLET | Refills: 1 | OUTPATIENT
Start: 2025-06-07

## 2025-06-09 RX ORDER — DILTIAZEM HYDROCHLORIDE 120 MG/1
CAPSULE, COATED, EXTENDED RELEASE ORAL DAILY
Qty: 90 CAPSULE | Refills: 3 | Status: SHIPPED | OUTPATIENT
Start: 2025-06-09